# Patient Record
Sex: MALE | Race: WHITE | NOT HISPANIC OR LATINO | Employment: OTHER | URBAN - METROPOLITAN AREA
[De-identification: names, ages, dates, MRNs, and addresses within clinical notes are randomized per-mention and may not be internally consistent; named-entity substitution may affect disease eponyms.]

---

## 2018-08-14 ENCOUNTER — HOSPITAL ENCOUNTER (EMERGENCY)
Facility: CLINIC | Age: 73
Discharge: HOME | End: 2018-08-14
Attending: EMERGENCY MEDICINE | Admitting: EMERGENCY MEDICINE
Payer: MEDICARE

## 2018-08-14 VITALS
SYSTOLIC BLOOD PRESSURE: 153 MMHG | OXYGEN SATURATION: 94 % | DIASTOLIC BLOOD PRESSURE: 79 MMHG | TEMPERATURE: 97.7 F | RESPIRATION RATE: 18 BRPM

## 2018-08-14 DIAGNOSIS — S60.10XA CONTUSION OF NAIL BED OF FINGER, INITIAL ENCOUNTER: ICD-10-CM

## 2018-08-14 DIAGNOSIS — S61.012A THUMB LACERATION, LEFT, INITIAL ENCOUNTER: ICD-10-CM

## 2018-08-14 PROCEDURE — 99283 EMERGENCY DEPT VISIT LOW MDM: CPT

## 2018-08-14 PROCEDURE — A9270 NON-COVERED ITEM OR SERVICE: HCPCS | Mod: GY | Performed by: EMERGENCY MEDICINE

## 2018-08-14 PROCEDURE — 25000132 ZZH RX MED GY IP 250 OP 250 PS 637: Performed by: EMERGENCY MEDICINE

## 2018-08-14 PROCEDURE — 12001 RPR S/N/AX/GEN/TRNK 2.5CM/<: CPT

## 2018-08-14 RX ORDER — LIDOCAINE HYDROCHLORIDE 10 MG/ML
INJECTION, SOLUTION INFILTRATION; PERINEURAL
Status: DISCONTINUED
Start: 2018-08-14 | End: 2018-08-14 | Stop reason: HOSPADM

## 2018-08-14 RX ORDER — ACETAMINOPHEN 325 MG/1
650 TABLET ORAL ONCE
Status: COMPLETED | OUTPATIENT
Start: 2018-08-14 | End: 2018-08-14

## 2018-08-14 RX ADMIN — ACETAMINOPHEN 650 MG: 325 TABLET, FILM COATED ORAL at 12:57

## 2018-08-14 ASSESSMENT — ENCOUNTER SYMPTOMS
COLOR CHANGE: 0
MYALGIAS: 1
JOINT SWELLING: 0
FEVER: 0
WEAKNESS: 0
ARTHRALGIAS: 1
NUMBNESS: 0
WOUND: 1

## 2018-08-14 NOTE — ED TRIAGE NOTES
Pt arrives to ED after an electric screwdriver slipped and 'drove the skin under the nail' to his left thumb. Pt holding pressure as he is on blood thinners. Pain 5/10.

## 2018-08-14 NOTE — DISCHARGE INSTRUCTIONS
He should wear splint to keep your wound covered and to prevent further damage.  It is okay to shower but just let the water gently run over your left thumb.  Do not scrub the wound.  He should keep her wound covered until it is healed, likely 1-2 weeks.  He should keep an eye out for increasing pain, redness, swelling or purulent drainage as these to be signs of infection but we will cover you with antibiotics to prevent this, hopefully.  He should make an appointment with her primary doctor in 1 week for recheck but should come back to the emergency department if you have any concerns or questions.

## 2018-08-14 NOTE — ED NOTES
Pt room was found to be empty by staff when NST entered to apply splint and get ready for dc. MD notified.

## 2018-08-14 NOTE — ED PROVIDER NOTES
History     Chief Complaint:  Hand Laceration    HPI   Celia Stallings is a 73 year old, right hand dominant, male who presents with a hand laceration. The patient reports that he suffered a laceration to his left thumb secondary to accidentally losing control of a screw  prior to arrival. The laceration is located to his left thumb and patient has somewhat dislodged the nail from the nail bed as well. He is on Coumadin for a history of heart valve replacement surgery. He denies any numbness, weakness, or an other symptoms/injuries.    Allergies:  No known drug allergies.    Medications:    Coumadin     Past Medical History:    Heart valve replacement - mechanical, on coumadin.   Kidney stones    Past Surgical History:    Heart valve replacement    Family History:    No pertinent family history.    Social History:  Smoking status: Never smoker  Alcohol use: No  Marital Status:        Review of Systems   Constitutional: Negative for fever.   Musculoskeletal: Positive for arthralgias and myalgias. Negative for joint swelling.   Skin: Positive for wound. Negative for color change.   Neurological: Negative for weakness and numbness.   All other systems reviewed and are negative.    Physical Exam     Patient Vitals for the past 24 hrs:   BP Temp Temp src Heart Rate Resp SpO2   08/14/18 1215 153/79 97.7  F (36.5  C) Oral 66 18 94 %         Physical Exam  Constitutional: Alert, attentive, GCS 15  MSK: Normal range of motion. Partial traumatic removal of lateral thumb nail with proximal end freed from nail fold. sensation intact. Good cap refill. Hemostatic. No focal bony tenderness.   Neurological: Alert, attentive, moving all extremities equally.   Skin: Skin is warm and dry.    Emergency Department Course   Procedures:   Digital Block     PROCEDURE:  Digital Block  LOCATION:  Left Thumb and Nail  ANESTHESIA: Digital block using Lidocaine 1%, total of 10 mLs  PROCEDURE NOTE: The patient tolerated the  procedure well with good relief of discomfort and there were no complications.       Laceration Repair - Partial Nail removal.      LACERATION:  A simple clean 1 cm laceration.    LOCATION:  Left Thumb.    FUNCTION:  Distally sensation, circulation, motor and tendon function are intact.    ANESTHESIA:  Digital block using Lidocaine 1% total of 10 mLs.    PREPARATION:  Irrigation and Scrubbing with Normal Saline and Shur Clens.    DEBRIDEMENT:  None    CLOSURE:  Wound was closed with One Layer.  Skin closed with 1 x 5.0 Ethylon using interrupted sutures after partial nail removal and replacement of proximal end back into nail fold. Hemostatic with tourniquet and at tourniquet take down.     Interventions:  (1257) Tylenol, 650 mg, PO    Emergency Department Course:  Nursing notes and vitals reviewed.  (1227) I performed an exam of the patient as documented above.    I performed a digital block on the patient in the ED for pain relief, see above note for details.  I performed a laceration repair, as documented above.    Findings and plan explained to the patient. Patient discharged home with instructions regarding supportive care, medications, and reasons to return. The importance of close follow-up was reviewed. Patient was provided prescription for keflex.  Impression & Plan      Medical Decision Making:  Celia Stallings is a 73 year old male with a history of mechanical heart valves on Coumadin, last check of INR last week and was within normal limits, presenting with distal left thumb nail injury which occurred as he was doing some cabinetry work and jammed his finger down the lateral aspect of his thumb. No clinical evidence of fracture on exam. Nailbed was explored, please see above procedure note for details, and no nailbed repair was required. His nail was partially hemisect and replaced underneath the nail fold, and a single suture was placed in the distal tip. Given his need for both lactic antibiotics for  dental procedures, and a high risk injury underneath the nail bed in the setting of mechanical heart valves, will provide prophylactic antibiotics, keflex. Return precautions reviewed.  Prior to placement an AlumaFoam splint and plan discharge with Keflex, patient eloped.    Diagnosis:    ICD-10-CM    1. Thumb laceration, left, initial encounter S61.012A    2. Contusion of nail bed of finger, initial encounter S60.10XA        Disposition:  Patient eloped but plan was for discharged to home.      Discharge Medications:  New Prescriptions    No medications on file     Inder Almaguer MD. 3:20 PM 08/14/18.     I, Ethan Li, am serving as a scribe on 8/14/2018 at 12:27 PM to personally document services performed by Dr. Almaguer based on my observations and the provider's statements to me.        Ethan Li  8/14/2018   [facility] EMERGENCY DEPARTMENT       Inder Almaguer MD  08/14/18 1525

## 2021-11-04 ENCOUNTER — APPOINTMENT (OUTPATIENT)
Dept: MRI IMAGING | Facility: CLINIC | Age: 76
DRG: 551 | End: 2021-11-04
Attending: EMERGENCY MEDICINE
Payer: COMMERCIAL

## 2021-11-04 ENCOUNTER — HOSPITAL ENCOUNTER (INPATIENT)
Facility: CLINIC | Age: 76
LOS: 2 days | Discharge: OTHER INSTITUTION | DRG: 551 | End: 2021-11-06
Attending: EMERGENCY MEDICINE | Admitting: INTERNAL MEDICINE
Payer: COMMERCIAL

## 2021-11-04 DIAGNOSIS — M46.40 DISCITIS, UNSPECIFIED SPINAL REGION: ICD-10-CM

## 2021-11-04 LAB
ALBUMIN SERPL-MCNC: 2.6 G/DL (ref 3.4–5)
ALP SERPL-CCNC: 60 U/L (ref 40–150)
ALT SERPL W P-5'-P-CCNC: 42 U/L (ref 0–70)
ANION GAP SERPL CALCULATED.3IONS-SCNC: 5 MMOL/L (ref 3–14)
AST SERPL W P-5'-P-CCNC: 30 U/L (ref 0–45)
BASOPHILS # BLD AUTO: 0 10E3/UL (ref 0–0.2)
BASOPHILS NFR BLD AUTO: 0 %
BILIRUB SERPL-MCNC: 1.1 MG/DL (ref 0.2–1.3)
BUN SERPL-MCNC: 26 MG/DL (ref 7–30)
CALCIUM SERPL-MCNC: 8.9 MG/DL (ref 8.5–10.1)
CHLORIDE BLD-SCNC: 103 MMOL/L (ref 94–109)
CO2 SERPL-SCNC: 28 MMOL/L (ref 20–32)
CREAT SERPL-MCNC: 0.98 MG/DL (ref 0.66–1.25)
CRP SERPL-MCNC: 108 MG/L (ref 0–8)
EOSINOPHIL # BLD AUTO: 0 10E3/UL (ref 0–0.7)
EOSINOPHIL NFR BLD AUTO: 1 %
ERYTHROCYTE [DISTWIDTH] IN BLOOD BY AUTOMATED COUNT: 12.9 % (ref 10–15)
ERYTHROCYTE [SEDIMENTATION RATE] IN BLOOD BY WESTERGREN METHOD: 42 MM/HR (ref 0–20)
GFR SERPL CREATININE-BSD FRML MDRD: 74 ML/MIN/1.73M2
GLUCOSE BLD-MCNC: 116 MG/DL (ref 70–99)
HCT VFR BLD AUTO: 36.5 % (ref 40–53)
HGB BLD-MCNC: 11.8 G/DL (ref 13.3–17.7)
IMM GRANULOCYTES # BLD: 0.1 10E3/UL
IMM GRANULOCYTES NFR BLD: 1 %
INR PPP: 6.85 (ref 0.85–1.15)
LYMPHOCYTES # BLD AUTO: 0.6 10E3/UL (ref 0.8–5.3)
LYMPHOCYTES NFR BLD AUTO: 8 %
MCH RBC QN AUTO: 29.4 PG (ref 26.5–33)
MCHC RBC AUTO-ENTMCNC: 32.3 G/DL (ref 31.5–36.5)
MCV RBC AUTO: 91 FL (ref 78–100)
MONOCYTES # BLD AUTO: 0.9 10E3/UL (ref 0–1.3)
MONOCYTES NFR BLD AUTO: 10 %
NEUTROPHILS # BLD AUTO: 6.7 10E3/UL (ref 1.6–8.3)
NEUTROPHILS NFR BLD AUTO: 80 %
NRBC # BLD AUTO: 0 10E3/UL
NRBC BLD AUTO-RTO: 0 /100
PLATELET # BLD AUTO: 249 10E3/UL (ref 150–450)
POTASSIUM BLD-SCNC: 4.2 MMOL/L (ref 3.4–5.3)
PROCALCITONIN SERPL-MCNC: 0.25 NG/ML
PROT SERPL-MCNC: 6 G/DL (ref 6.8–8.8)
RBC # BLD AUTO: 4.01 10E6/UL (ref 4.4–5.9)
SARS-COV-2 RNA RESP QL NAA+PROBE: NEGATIVE
SODIUM SERPL-SCNC: 136 MMOL/L (ref 133–144)
WBC # BLD AUTO: 8.3 10E3/UL (ref 4–11)

## 2021-11-04 PROCEDURE — C9803 HOPD COVID-19 SPEC COLLECT: HCPCS

## 2021-11-04 PROCEDURE — 99223 1ST HOSP IP/OBS HIGH 75: CPT | Mod: AI | Performed by: INTERNAL MEDICINE

## 2021-11-04 PROCEDURE — 120N000001 HC R&B MED SURG/OB

## 2021-11-04 PROCEDURE — 82040 ASSAY OF SERUM ALBUMIN: CPT | Performed by: EMERGENCY MEDICINE

## 2021-11-04 PROCEDURE — 250N000013 HC RX MED GY IP 250 OP 250 PS 637: Performed by: INTERNAL MEDICINE

## 2021-11-04 PROCEDURE — 250N000011 HC RX IP 250 OP 636: Performed by: EMERGENCY MEDICINE

## 2021-11-04 PROCEDURE — 87077 CULTURE AEROBIC IDENTIFY: CPT | Performed by: EMERGENCY MEDICINE

## 2021-11-04 PROCEDURE — 36415 COLL VENOUS BLD VENIPUNCTURE: CPT | Performed by: INTERNAL MEDICINE

## 2021-11-04 PROCEDURE — 86140 C-REACTIVE PROTEIN: CPT | Performed by: EMERGENCY MEDICINE

## 2021-11-04 PROCEDURE — 96375 TX/PRO/DX INJ NEW DRUG ADDON: CPT

## 2021-11-04 PROCEDURE — 36415 COLL VENOUS BLD VENIPUNCTURE: CPT | Performed by: EMERGENCY MEDICINE

## 2021-11-04 PROCEDURE — 99285 EMERGENCY DEPT VISIT HI MDM: CPT | Mod: 25

## 2021-11-04 PROCEDURE — 84145 PROCALCITONIN (PCT): CPT | Performed by: INTERNAL MEDICINE

## 2021-11-04 PROCEDURE — 85610 PROTHROMBIN TIME: CPT | Performed by: INTERNAL MEDICINE

## 2021-11-04 PROCEDURE — 72148 MRI LUMBAR SPINE W/O DYE: CPT | Mod: MG

## 2021-11-04 PROCEDURE — 96366 THER/PROPH/DIAG IV INF ADDON: CPT

## 2021-11-04 PROCEDURE — 96365 THER/PROPH/DIAG IV INF INIT: CPT

## 2021-11-04 PROCEDURE — 85025 COMPLETE CBC W/AUTO DIFF WBC: CPT | Performed by: EMERGENCY MEDICINE

## 2021-11-04 PROCEDURE — 250N000011 HC RX IP 250 OP 636: Performed by: INTERNAL MEDICINE

## 2021-11-04 PROCEDURE — 85652 RBC SED RATE AUTOMATED: CPT | Performed by: EMERGENCY MEDICINE

## 2021-11-04 PROCEDURE — 87149 DNA/RNA DIRECT PROBE: CPT | Performed by: EMERGENCY MEDICINE

## 2021-11-04 PROCEDURE — 258N000003 HC RX IP 258 OP 636: Performed by: EMERGENCY MEDICINE

## 2021-11-04 PROCEDURE — 80053 COMPREHEN METABOLIC PANEL: CPT | Performed by: EMERGENCY MEDICINE

## 2021-11-04 PROCEDURE — 87635 SARS-COV-2 COVID-19 AMP PRB: CPT | Performed by: EMERGENCY MEDICINE

## 2021-11-04 PROCEDURE — 96372 THER/PROPH/DIAG INJ SC/IM: CPT | Performed by: EMERGENCY MEDICINE

## 2021-11-04 RX ORDER — ACETAMINOPHEN 650 MG/1
650 SUPPOSITORY RECTAL EVERY 6 HOURS PRN
Status: DISCONTINUED | OUTPATIENT
Start: 2021-11-04 | End: 2021-11-06 | Stop reason: HOSPADM

## 2021-11-04 RX ORDER — NALOXONE HYDROCHLORIDE 0.4 MG/ML
0.2 INJECTION, SOLUTION INTRAMUSCULAR; INTRAVENOUS; SUBCUTANEOUS
Status: DISCONTINUED | OUTPATIENT
Start: 2021-11-04 | End: 2021-11-06 | Stop reason: HOSPADM

## 2021-11-04 RX ORDER — NALOXONE HYDROCHLORIDE 0.4 MG/ML
0.4 INJECTION, SOLUTION INTRAMUSCULAR; INTRAVENOUS; SUBCUTANEOUS
Status: DISCONTINUED | OUTPATIENT
Start: 2021-11-04 | End: 2021-11-06 | Stop reason: HOSPADM

## 2021-11-04 RX ORDER — AMLODIPINE BESYLATE 2.5 MG/1
2.5 TABLET ORAL DAILY
Status: ON HOLD | COMMUNITY
Start: 2021-04-13 | End: 2024-10-05

## 2021-11-04 RX ORDER — FINASTERIDE 5 MG/1
5 TABLET, FILM COATED ORAL DAILY
Status: DISCONTINUED | OUTPATIENT
Start: 2021-11-04 | End: 2021-11-06 | Stop reason: HOSPADM

## 2021-11-04 RX ORDER — LOSARTAN POTASSIUM 50 MG/1
50 TABLET ORAL DAILY
COMMUNITY
Start: 2021-02-19

## 2021-11-04 RX ORDER — WARFARIN SODIUM 5 MG/1
5 TABLET ORAL DAILY
Status: ON HOLD | COMMUNITY
Start: 2021-03-03 | End: 2024-10-05

## 2021-11-04 RX ORDER — ONDANSETRON 2 MG/ML
4 INJECTION INTRAMUSCULAR; INTRAVENOUS EVERY 6 HOURS PRN
Status: DISCONTINUED | OUTPATIENT
Start: 2021-11-04 | End: 2021-11-06 | Stop reason: HOSPADM

## 2021-11-04 RX ORDER — TAMSULOSIN HYDROCHLORIDE 0.4 MG/1
0.4 CAPSULE ORAL DAILY
COMMUNITY
Start: 2021-10-06 | End: 2022-10-06

## 2021-11-04 RX ORDER — GABAPENTIN 100 MG/1
100 CAPSULE ORAL DAILY
Status: DISCONTINUED | OUTPATIENT
Start: 2021-11-05 | End: 2021-11-06 | Stop reason: HOSPADM

## 2021-11-04 RX ORDER — METOPROLOL SUCCINATE 25 MG/1
25 TABLET, EXTENDED RELEASE ORAL DAILY
Status: DISCONTINUED | OUTPATIENT
Start: 2021-11-04 | End: 2021-11-06 | Stop reason: HOSPADM

## 2021-11-04 RX ORDER — ONDANSETRON 4 MG/1
4 TABLET, ORALLY DISINTEGRATING ORAL EVERY 6 HOURS PRN
Status: DISCONTINUED | OUTPATIENT
Start: 2021-11-04 | End: 2021-11-06 | Stop reason: HOSPADM

## 2021-11-04 RX ORDER — HYDROMORPHONE HYDROCHLORIDE 1 MG/ML
0.5 INJECTION, SOLUTION INTRAMUSCULAR; INTRAVENOUS; SUBCUTANEOUS ONCE
Status: COMPLETED | OUTPATIENT
Start: 2021-11-04 | End: 2021-11-04

## 2021-11-04 RX ORDER — LIDOCAINE 40 MG/G
CREAM TOPICAL
Status: DISCONTINUED | OUTPATIENT
Start: 2021-11-04 | End: 2021-11-06 | Stop reason: HOSPADM

## 2021-11-04 RX ORDER — METOPROLOL SUCCINATE 25 MG/1
25 TABLET, EXTENDED RELEASE ORAL DAILY
Status: ON HOLD | COMMUNITY
Start: 2021-04-13 | End: 2024-10-05

## 2021-11-04 RX ORDER — ONDANSETRON 4 MG/1
4 TABLET, ORALLY DISINTEGRATING ORAL ONCE
Status: COMPLETED | OUTPATIENT
Start: 2021-11-04 | End: 2021-11-04

## 2021-11-04 RX ORDER — ACETAMINOPHEN 325 MG/1
650 TABLET ORAL EVERY 6 HOURS PRN
Status: DISCONTINUED | OUTPATIENT
Start: 2021-11-04 | End: 2021-11-06 | Stop reason: HOSPADM

## 2021-11-04 RX ORDER — TAMSULOSIN HYDROCHLORIDE 0.4 MG/1
0.4 CAPSULE ORAL DAILY
Status: DISCONTINUED | OUTPATIENT
Start: 2021-11-04 | End: 2021-11-06 | Stop reason: HOSPADM

## 2021-11-04 RX ORDER — HYDROMORPHONE HCL IN WATER/PF 6 MG/30 ML
0.2 PATIENT CONTROLLED ANALGESIA SYRINGE INTRAVENOUS
Status: DISCONTINUED | OUTPATIENT
Start: 2021-11-04 | End: 2021-11-06 | Stop reason: HOSPADM

## 2021-11-04 RX ORDER — MORPHINE SULFATE 4 MG/ML
6 INJECTION, SOLUTION INTRAMUSCULAR; INTRAVENOUS ONCE
Status: COMPLETED | OUTPATIENT
Start: 2021-11-04 | End: 2021-11-04

## 2021-11-04 RX ORDER — ATORVASTATIN CALCIUM 20 MG/1
20 TABLET, FILM COATED ORAL DAILY
Status: ON HOLD | COMMUNITY
Start: 2021-02-19 | End: 2024-10-05

## 2021-11-04 RX ORDER — CEFAZOLIN SODIUM 1 G/50ML
1750 SOLUTION INTRAVENOUS EVERY 24 HOURS
Status: DISCONTINUED | OUTPATIENT
Start: 2021-11-05 | End: 2021-11-06

## 2021-11-04 RX ORDER — CEFAZOLIN SODIUM 1 G/50ML
2000 SOLUTION INTRAVENOUS ONCE
Status: COMPLETED | OUTPATIENT
Start: 2021-11-04 | End: 2021-11-04

## 2021-11-04 RX ORDER — LIDOCAINE 4 G/G
1 PATCH TOPICAL
Status: DISCONTINUED | OUTPATIENT
Start: 2021-11-04 | End: 2021-11-06 | Stop reason: HOSPADM

## 2021-11-04 RX ORDER — FINASTERIDE 5 MG/1
5 TABLET, FILM COATED ORAL DAILY
COMMUNITY
Start: 2021-10-06 | End: 2022-10-06

## 2021-11-04 RX ADMIN — METOPROLOL SUCCINATE 25 MG: 25 TABLET, EXTENDED RELEASE ORAL at 18:03

## 2021-11-04 RX ADMIN — HYDROMORPHONE HYDROCHLORIDE 1 MG: 2 TABLET ORAL at 21:11

## 2021-11-04 RX ADMIN — FINASTERIDE 5 MG: 5 TABLET, FILM COATED ORAL at 18:03

## 2021-11-04 RX ADMIN — ONDANSETRON 4 MG: 4 TABLET, ORALLY DISINTEGRATING ORAL at 08:54

## 2021-11-04 RX ADMIN — HYDROMORPHONE HYDROCHLORIDE 0.2 MG: 0.2 INJECTION, SOLUTION INTRAMUSCULAR; INTRAVENOUS; SUBCUTANEOUS at 22:17

## 2021-11-04 RX ADMIN — MORPHINE SULFATE 6 MG: 4 INJECTION INTRAVENOUS at 08:55

## 2021-11-04 RX ADMIN — TAMSULOSIN HYDROCHLORIDE 0.4 MG: 0.4 CAPSULE ORAL at 18:00

## 2021-11-04 RX ADMIN — HYDROMORPHONE HYDROCHLORIDE 0.5 MG: 1 INJECTION, SOLUTION INTRAMUSCULAR; INTRAVENOUS; SUBCUTANEOUS at 17:59

## 2021-11-04 RX ADMIN — LIDOCAINE 1 PATCH: 246 PATCH TOPICAL at 21:11

## 2021-11-04 RX ADMIN — VANCOMYCIN HYDROCHLORIDE 2000 MG: 10 INJECTION, POWDER, LYOPHILIZED, FOR SOLUTION INTRAVENOUS at 16:12

## 2021-11-04 ASSESSMENT — ENCOUNTER SYMPTOMS
WEAKNESS: 0
NUMBNESS: 0
BACK PAIN: 1

## 2021-11-04 ASSESSMENT — ACTIVITIES OF DAILY LIVING (ADL)
ADLS_ACUITY_SCORE: 12
WHICH_OF_THE_ABOVE_FUNCTIONAL_RISKS_HAD_A_RECENT_ONSET_OR_CHANGE?: AMBULATION
WALKING_OR_CLIMBING_STAIRS: AMBULATION DIFFICULTY, REQUIRES EQUIPMENT
DOING_ERRANDS_INDEPENDENTLY_DIFFICULTY: NO
FALL_HISTORY_WITHIN_LAST_SIX_MONTHS: NO
ADLS_ACUITY_SCORE: 12
ADLS_ACUITY_SCORE: 6
DRESSING/BATHING_DIFFICULTY: NO
HEARING_DIFFICULTY_OR_DEAF: NO
EQUIPMENT_CURRENTLY_USED_AT_HOME: CANE, STRAIGHT
TOILETING_ISSUES: NO
WEAR_GLASSES_OR_BLIND: NO
DIFFICULTY_COMMUNICATING: NO
ADLS_ACUITY_SCORE: 7
ADLS_ACUITY_SCORE: 4
ADLS_ACUITY_SCORE: 12
ADLS_ACUITY_SCORE: 4
WALKING_OR_CLIMBING_STAIRS_DIFFICULTY: YES
CONCENTRATING,_REMEMBERING_OR_MAKING_DECISIONS_DIFFICULTY: NO
DIFFICULTY_EATING/SWALLOWING: NO

## 2021-11-04 ASSESSMENT — MIFFLIN-ST. JEOR: SCORE: 1640.25
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
## 2021-11-04 NOTE — PHARMACY-ADMISSION MEDICATION HISTORY
Admission medication history interview status for this patient is complete. See [facility] admission navigator for allergy information, prior to admission medications and immunization status.     Medication history interview done, indicate source(s): Patient  Medication history resources (including written lists, pill bottles, clinic record):[place], Care Everywhere, chart review  Pharmacy: [facility] [address]    Changes made to PTA medication list:  Added: all meds  Changed: none  Reported as Not Taking: none  Removed: none    Actions taken by pharmacist (provider contacted, etc):left sticky note for provider     Additional medication history information:patient has a prescription for Lovenox with start date on 11/22/2021.    Medication reconciliation/reorder completed by provider prior to medication history?  N    Prior to Admission medications    Medication Sig Last Dose Taking? Auth Provider   amLODIPine (NORVASC) 2.5 MG tablet Take 2.5 mg by mouth daily 11/3/2021 at Unknown time Yes Unknown, Entered By History   atorvastatin (LIPITOR) 20 MG tablet Take 20 mg by mouth daily 11/3/2021 at Unknown time Yes Unknown, Entered By History   enoxaparin ANTICOAGULANT (LOVENOX) 100 MG/ML syringe Inject 100 mg Subcutaneous every 12 hours  at not started yet; start date is 11/22 Yes Unknown, Entered By History   finasteride (PROSCAR) 5 MG tablet Take 5 mg by mouth daily 11/3/2021 at Unknown time Yes Unknown, Entered By History   losartan (COZAAR) 100 MG tablet Take 100 mg by mouth daily 11/3/2021 at Unknown time Yes Unknown, Entered By History   metoprolol succinate ER (TOPROL-XL) 25 MG 24 hr tablet Take 25 mg by mouth daily 11/3/2021 at Unknown time Yes Unknown, Entered By History   tamsulosin (FLOMAX) 0.4 MG capsule Take 0.4 mg by mouth daily 11/3/2021 at Unknown time Yes Unknown, Entered By History   warfarin ANTICOAGULANT (COUMADIN) 5 MG tablet Take 5 mg by mouth daily  11/3/2021 at AM Yes Unknown, Entered By History

## 2021-11-04 NOTE — ED NOTES
Entered pt room to administer meds. Pt's son brought home medications and pt reported he took his seven daily meds. Nurse had previously advised son to not give pt home medications. Documented meds that pt took on MAR and updated home medication list with accurate times for home meds that were not yet ordered.

## 2021-11-05 ENCOUNTER — APPOINTMENT (OUTPATIENT)
Dept: CARDIOLOGY | Facility: CLINIC | Age: 76
DRG: 551 | End: 2021-11-05
Attending: INTERNAL MEDICINE
Payer: COMMERCIAL

## 2021-11-05 ENCOUNTER — APPOINTMENT (OUTPATIENT)
Dept: OCCUPATIONAL THERAPY | Facility: CLINIC | Age: 76
DRG: 551 | End: 2021-11-05
Attending: INTERNAL MEDICINE
Payer: COMMERCIAL

## 2021-11-05 LAB
CRP SERPL-MCNC: 93.7 MG/L (ref 0–8)
ENTEROCOCCUS FAECALIS: DETECTED
ENTEROCOCCUS FAECIUM: NOT DETECTED
ERYTHROCYTE [SEDIMENTATION RATE] IN BLOOD BY WESTERGREN METHOD: 37 MM/HR (ref 0–20)
INR PPP: 5.91 (ref 0.85–1.15)
LISTERIA SPECIES (DETECTED/NOT DETECTED): NOT DETECTED
LVEF ECHO: NORMAL
STAPHYLOCOCCUS AUREUS: NOT DETECTED
STAPHYLOCOCCUS EPIDERMIDIS: NOT DETECTED
STAPHYLOCOCCUS LUGDUNENSIS: NOT DETECTED
STAPHYLOCOCCUS SPECIES: NOT DETECTED
STREPTOCOCCUS AGALACTIAE: NOT DETECTED
STREPTOCOCCUS ANGINOSUS GROUP: NOT DETECTED
STREPTOCOCCUS PNEUMONIAE: NOT DETECTED
STREPTOCOCCUS PYOGENES: NOT DETECTED
STREPTOCOCCUS SPECIES: NOT DETECTED

## 2021-11-05 PROCEDURE — 36415 COLL VENOUS BLD VENIPUNCTURE: CPT | Performed by: INTERNAL MEDICINE

## 2021-11-05 PROCEDURE — 87077 CULTURE AEROBIC IDENTIFY: CPT | Performed by: INTERNAL MEDICINE

## 2021-11-05 PROCEDURE — 93320 DOPPLER ECHO COMPLETE: CPT | Mod: 26 | Performed by: INTERNAL MEDICINE

## 2021-11-05 PROCEDURE — 85652 RBC SED RATE AUTOMATED: CPT | Performed by: INTERNAL MEDICINE

## 2021-11-05 PROCEDURE — 93325 DOPPLER ECHO COLOR FLOW MAPG: CPT | Mod: 26 | Performed by: INTERNAL MEDICINE

## 2021-11-05 PROCEDURE — 258N000003 HC RX IP 258 OP 636: Performed by: INTERNAL MEDICINE

## 2021-11-05 PROCEDURE — 250N000011 HC RX IP 250 OP 636

## 2021-11-05 PROCEDURE — 85610 PROTHROMBIN TIME: CPT | Performed by: INTERNAL MEDICINE

## 2021-11-05 PROCEDURE — 250N000013 HC RX MED GY IP 250 OP 250 PS 637: Performed by: INTERNAL MEDICINE

## 2021-11-05 PROCEDURE — 120N000001 HC R&B MED SURG/OB

## 2021-11-05 PROCEDURE — 86140 C-REACTIVE PROTEIN: CPT | Performed by: INTERNAL MEDICINE

## 2021-11-05 PROCEDURE — 99222 1ST HOSP IP/OBS MODERATE 55: CPT | Mod: 25 | Performed by: INTERNAL MEDICINE

## 2021-11-05 PROCEDURE — 250N000011 HC RX IP 250 OP 636: Performed by: INTERNAL MEDICINE

## 2021-11-05 PROCEDURE — 93325 DOPPLER ECHO COLOR FLOW MAPG: CPT

## 2021-11-05 PROCEDURE — 93312 ECHO TRANSESOPHAGEAL: CPT | Mod: 26 | Performed by: INTERNAL MEDICINE

## 2021-11-05 PROCEDURE — 99233 SBSQ HOSP IP/OBS HIGH 50: CPT | Performed by: INTERNAL MEDICINE

## 2021-11-05 PROCEDURE — 250N000009 HC RX 250

## 2021-11-05 PROCEDURE — 97166 OT EVAL MOD COMPLEX 45 MIN: CPT | Mod: GO | Performed by: REHABILITATION PRACTITIONER

## 2021-11-05 PROCEDURE — 99152 MOD SED SAME PHYS/QHP 5/>YRS: CPT | Performed by: INTERNAL MEDICINE

## 2021-11-05 PROCEDURE — 76376 3D RENDER W/INTRP POSTPROCES: CPT | Performed by: INTERNAL MEDICINE

## 2021-11-05 RX ORDER — FENTANYL CITRATE 50 UG/ML
25 INJECTION, SOLUTION INTRAMUSCULAR; INTRAVENOUS
Status: DISCONTINUED | OUTPATIENT
Start: 2021-11-05 | End: 2021-11-06 | Stop reason: HOSPADM

## 2021-11-05 RX ORDER — LIDOCAINE 40 MG/G
CREAM TOPICAL
Status: DISCONTINUED | OUTPATIENT
Start: 2021-11-05 | End: 2021-11-06 | Stop reason: HOSPADM

## 2021-11-05 RX ORDER — LIDOCAINE HYDROCHLORIDE 20 MG/ML
SOLUTION OROPHARYNGEAL
Status: COMPLETED
Start: 2021-11-05 | End: 2021-11-05

## 2021-11-05 RX ORDER — NALOXONE HYDROCHLORIDE 0.4 MG/ML
0.4 INJECTION, SOLUTION INTRAMUSCULAR; INTRAVENOUS; SUBCUTANEOUS
Status: DISCONTINUED | OUTPATIENT
Start: 2021-11-05 | End: 2021-11-06 | Stop reason: HOSPADM

## 2021-11-05 RX ORDER — GLYCOPYRROLATE 0.2 MG/ML
INJECTION INTRAMUSCULAR; INTRAVENOUS
Status: COMPLETED
Start: 2021-11-05 | End: 2021-11-05

## 2021-11-05 RX ORDER — LIDOCAINE HYDROCHLORIDE 40 MG/ML
1.5 SOLUTION TOPICAL ONCE
Status: DISCONTINUED | OUTPATIENT
Start: 2021-11-05 | End: 2021-11-06 | Stop reason: HOSPADM

## 2021-11-05 RX ORDER — DEXTROSE MONOHYDRATE 25 G/50ML
9.5 INJECTION, SOLUTION INTRAVENOUS
Status: DISCONTINUED | OUTPATIENT
Start: 2021-11-05 | End: 2021-11-06 | Stop reason: HOSPADM

## 2021-11-05 RX ORDER — LIDOCAINE 50 MG/G
OINTMENT TOPICAL ONCE
Status: DISCONTINUED | OUTPATIENT
Start: 2021-11-05 | End: 2021-11-06 | Stop reason: HOSPADM

## 2021-11-05 RX ORDER — FLUMAZENIL 0.1 MG/ML
INJECTION, SOLUTION INTRAVENOUS
Status: DISCONTINUED
Start: 2021-11-05 | End: 2021-11-05 | Stop reason: WASHOUT

## 2021-11-05 RX ORDER — LIDOCAINE HYDROCHLORIDE 20 MG/ML
15 SOLUTION OROPHARYNGEAL ONCE
Status: COMPLETED | OUTPATIENT
Start: 2021-11-05 | End: 2021-11-05

## 2021-11-05 RX ORDER — FLUMAZENIL 0.1 MG/ML
0.2 INJECTION, SOLUTION INTRAVENOUS
Status: DISCONTINUED | OUTPATIENT
Start: 2021-11-05 | End: 2021-11-06 | Stop reason: HOSPADM

## 2021-11-05 RX ORDER — NALOXONE HYDROCHLORIDE 0.4 MG/ML
0.2 INJECTION, SOLUTION INTRAMUSCULAR; INTRAVENOUS; SUBCUTANEOUS
Status: DISCONTINUED | OUTPATIENT
Start: 2021-11-05 | End: 2021-11-06 | Stop reason: HOSPADM

## 2021-11-05 RX ORDER — SODIUM CHLORIDE 9 MG/ML
INJECTION, SOLUTION INTRAVENOUS CONTINUOUS PRN
Status: DISCONTINUED | OUTPATIENT
Start: 2021-11-05 | End: 2021-11-06 | Stop reason: CLARIF

## 2021-11-05 RX ORDER — NALOXONE HYDROCHLORIDE 0.4 MG/ML
INJECTION, SOLUTION INTRAMUSCULAR; INTRAVENOUS; SUBCUTANEOUS
Status: DISCONTINUED
Start: 2021-11-05 | End: 2021-11-05 | Stop reason: WASHOUT

## 2021-11-05 RX ORDER — CEFTRIAXONE 2 G/1
2 INJECTION, POWDER, FOR SOLUTION INTRAMUSCULAR; INTRAVENOUS EVERY 24 HOURS
Status: DISCONTINUED | OUTPATIENT
Start: 2021-11-05 | End: 2021-11-06

## 2021-11-05 RX ORDER — FENTANYL CITRATE 50 UG/ML
INJECTION, SOLUTION INTRAMUSCULAR; INTRAVENOUS
Status: DISCONTINUED
Start: 2021-11-05 | End: 2021-11-05 | Stop reason: WASHOUT

## 2021-11-05 RX ORDER — FENTANYL CITRATE 50 UG/ML
INJECTION, SOLUTION INTRAMUSCULAR; INTRAVENOUS
Status: COMPLETED
Start: 2021-11-05 | End: 2021-11-05

## 2021-11-05 RX ORDER — GLYCOPYRROLATE 0.2 MG/ML
0.1 INJECTION, SOLUTION INTRAMUSCULAR; INTRAVENOUS ONCE
Status: COMPLETED | OUTPATIENT
Start: 2021-11-05 | End: 2021-11-05

## 2021-11-05 RX ADMIN — TOPICAL ANESTHETIC 0.5 ML: 200 SPRAY DENTAL; PERIODONTAL at 14:31

## 2021-11-05 RX ADMIN — LIDOCAINE 1 PATCH: 246 PATCH TOPICAL at 20:23

## 2021-11-05 RX ADMIN — CEFTRIAXONE 2 G: 2 INJECTION, POWDER, FOR SOLUTION INTRAMUSCULAR; INTRAVENOUS at 07:48

## 2021-11-05 RX ADMIN — LIDOCAINE HYDROCHLORIDE 15 ML: 20 SOLUTION ORAL; TOPICAL at 14:16

## 2021-11-05 RX ADMIN — GLYCOPYRROLATE 0.1 MG: 0.2 INJECTION, SOLUTION INTRAMUSCULAR; INTRAVENOUS at 14:19

## 2021-11-05 RX ADMIN — HYDROMORPHONE HYDROCHLORIDE 1 MG: 2 TABLET ORAL at 01:15

## 2021-11-05 RX ADMIN — TOPICAL ANESTHETIC 0.5 ML: 200 SPRAY DENTAL; PERIODONTAL at 14:32

## 2021-11-05 RX ADMIN — MIDAZOLAM 2.5 MG: 1 INJECTION INTRAMUSCULAR; INTRAVENOUS at 14:37

## 2021-11-05 RX ADMIN — FENTANYL CITRATE 100 MCG: 50 INJECTION, SOLUTION INTRAMUSCULAR; INTRAVENOUS at 14:40

## 2021-11-05 RX ADMIN — HYDROMORPHONE HYDROCHLORIDE 1 MG: 2 TABLET ORAL at 20:23

## 2021-11-05 RX ADMIN — VANCOMYCIN HYDROCHLORIDE 1750 MG: 10 INJECTION, POWDER, LYOPHILIZED, FOR SOLUTION INTRAVENOUS at 16:11

## 2021-11-05 RX ADMIN — GABAPENTIN 100 MG: 100 CAPSULE ORAL at 08:58

## 2021-11-05 RX ADMIN — MIDAZOLAM 1.5 MG: 1 INJECTION INTRAMUSCULAR; INTRAVENOUS at 14:46

## 2021-11-05 RX ADMIN — Medication 1 MG: at 01:16

## 2021-11-05 RX ADMIN — ACETAMINOPHEN 650 MG: 325 TABLET, FILM COATED ORAL at 09:12

## 2021-11-05 RX ADMIN — HYDROMORPHONE HYDROCHLORIDE 1 MG: 2 TABLET ORAL at 13:25

## 2021-11-05 RX ADMIN — TAMSULOSIN HYDROCHLORIDE 0.4 MG: 0.4 CAPSULE ORAL at 08:59

## 2021-11-05 RX ADMIN — METOPROLOL SUCCINATE 25 MG: 25 TABLET, EXTENDED RELEASE ORAL at 08:59

## 2021-11-05 RX ADMIN — LIDOCAINE HYDROCHLORIDE 15 ML: 20 SOLUTION OROPHARYNGEAL at 14:16

## 2021-11-05 RX ADMIN — MIDAZOLAM 1 MG: 1 INJECTION INTRAMUSCULAR; INTRAVENOUS at 14:56

## 2021-11-05 RX ADMIN — HYDROMORPHONE HYDROCHLORIDE 1 MG: 2 TABLET ORAL at 09:12

## 2021-11-05 RX ADMIN — ACETAMINOPHEN 650 MG: 325 TABLET, FILM COATED ORAL at 17:20

## 2021-11-05 RX ADMIN — ACETAMINOPHEN 650 MG: 325 TABLET, FILM COATED ORAL at 01:16

## 2021-11-05 RX ADMIN — FINASTERIDE 5 MG: 5 TABLET, FILM COATED ORAL at 08:58

## 2021-11-05 RX ADMIN — LIDOCAINE HYDROCHLORIDE 15 ML: 20 SOLUTION ORAL; TOPICAL at 14:17

## 2021-11-05 ASSESSMENT — ACTIVITIES OF DAILY LIVING (ADL)
ADLS_ACUITY_SCORE: 12
ADLS_ACUITY_SCORE: 6
ADLS_ACUITY_SCORE: 6
ADLS_ACUITY_SCORE: 12
ADLS_ACUITY_SCORE: 6
ADLS_ACUITY_SCORE: 12
PREVIOUS_RESPONSIBILITIES: MEAL PREP;HOUSEKEEPING;LAUNDRY;SHOPPING;MEDICATION MANAGEMENT;FINANCES;DRIVING;WORK
ADLS_ACUITY_SCORE: 12

## 2021-11-05 NOTE — PROVIDER NOTIFICATION
DATE:  11/5/2021   TIME OF RECEIPT FROM LAB:  0643  LAB TEST:  Blood cultures drawn on 11/4/21 at 1502 and 1507  LAB VALUE:  Growing Gram+ cocci in pairs and chains  RESULTS GIVEN WITH READ-BACK TO (PROVIDER):  Admitting Pager (Hospitalist)    TIME LAB VALUE REPORTED TO PROVIDER:   0645

## 2021-11-05 NOTE — PROVIDER NOTIFICATION
DATE:  11/5/2021   TIME OF RECEIPT FROM LAB:  1004  LAB TEST:  Blood culture drawn on 11/4/2021 at 1502 now shows enterococcus faecalis  LAB VALUE:  Enterococcus faecalis  RESULTS GIVEN WITH READ-BACK TO (PROVIDER):  parpia   TIME LAB VALUE REPORTED TO PROVIDER:   1007

## 2021-11-05 NOTE — PLAN OF CARE
Arrived to room 648 from ER at 2040 via cart, alert and oriented x 4, oriented to room and call system, IV patent, rates pain 7/10 (10 with movement), reviewed welcome folder and pain/medication information packet. Admission profile completed. Home medications sent to pharmacy. Pt voiding small amounts. Straight caths at home but declined at this time. PRN PO dilaudid and IV dilaudid given, lidocaine patch in place, and ice placed. Will continue to monitor.

## 2021-11-05 NOTE — PHARMACY-VANCOMYCIN DOSING SERVICE
Pharmacy Vancomycin Initial Note  Date of Service 2021  Patient's  1945  76 year old, male    Indication: Bone and Joint Infection    Current estimated CrCl = Estimated Creatinine Clearance: 69.9 mL/min (based on SCr of 0.98 mg/dL).    Creatinine for last 3 days  2021: 11:17 AM Creatinine 0.98 mg/dL    Recent Vancomycin Level(s) for last 3 days  No results found for requested labs within last 72 hours.      Vancomycin IV Administrations (past 72 hours)                   vancomycin (VANCOCIN) 2,000 mg in sodium chloride 0.9 % 500 mL intermittent infusion (mg) 2,000 mg New Bag 21 1612                Nephrotoxins and other renal medications (From now, onward)    Start     Dose/Rate Route Frequency Ordered Stop    21 1600  vancomycin (VANCOCIN) 1,750 mg in sodium chloride 0.9 % 500 mL intermittent infusion      1,750 mg  over 2 Hours Intravenous EVERY 24 HOURS 21            Contrast Orders - past 72 hours (72h ago, onward)    None        Loading dose: 2000 mg at 16:12 2021.  Regimen: 1750 mg IV every 24 hours.  Start time: 1600 on 2021  Exposure target: AUC24 (range)400-600 mg/L.hr   AUC24,ss: 514 mg/L.hr  Probability of AUC24 > 400: 77 %  Ctrough,ss: 14.7 mg/L  Probability of Ctrough,ss > 20: 25 %  Probability of nephrotoxicity (Lodise STORMY ): 10 %          Plan:  1. Start vancomycin  1750 mg IV q24h.   2. Vancomycin monitoring method: AUC  3. Vancomycin therapeutic monitoring goal: 400-600 mg*h/L  4. Pharmacy will check vancomycin levels as appropriate in 3-5 Days.    5. Serum creatinine levels will be ordered every 48 hours.      Tino Willis [facility]
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
## 2021-11-05 NOTE — CONSULTS
ID consult dictated IMP 1 75 yo male with back pain, in retropsect sig systemic sxs/confusion, primary issue immediately apparent with high grade enterocccus bacteremia, almost certainly PVE(artAV and MV)    REc serail BC, DARRIN, for now vanco/ceftriaxone, if sens to amp/ceftriaxone, ? Transfer to facility with card surgery but no obvious urgent threat

## 2021-11-05 NOTE — PROGRESS NOTES
Cardiology consult dictated.  76-year-old patient with a prior [place] mechanical mitral valve and a [employer]onic Peralta aortic valve with evidence of discitis and high-grade Enterococcus bacteremia.  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  For transesophageal echocardiogram today.  Infectious disease feels the patient need to be transferred to a facility with cardiovascular surgery.  Patient is normally followed at [facility] so he should be transferred to [facility].

## 2021-11-05 NOTE — PROGRESS NOTES
Preliminary DARRIN report  No obvious vegetations seen on the mitral and aortic mechanical valve prosthesis.  No obvious abscess formation and no vegetation seen on the tricuspid nor on the pulmonary valves with no evidence of perforation.  Both prostheses appear to be functioning well.
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
## 2021-11-05 NOTE — PROVIDER NOTIFICATION
DATE:  11/5/2021   TIME OF RECEIPT FROM LAB:  0706  LAB TEST:  INR  LAB VALUE:  5.91  RESULTS GIVEN WITH READ-BACK TO (PROVIDER):  Admitting pager (Hospitalist)    TIME LAB VALUE REPORTED TO PROVIDER:   0710

## 2021-11-05 NOTE — PLAN OF CARE
VS-afebrile, stable, tele ordered and started. Blood culture drawn on 11/4 at 1502 now shows enterococcus faecalis. Both Dr. Ambrocio and Dr. Marsh aware. On vanco and rocephin.  Lung Sounds-clear, no cough, on room air, taking deep breathes.   O2-on room air.   GI-+bs, +flatus. Lbm was Wednesday. Tolerating reg bkst. Npo at 1120,   -bladder scanned at 1000 for 0. Pt st caths himself 1-2 times per day. Started 2 weeks ago per pt. St cathed for 600cc claire in color at 1355. No void via urinal this shift.   IVF-on rocephin and vanco.   Dressings-none.   CMS-denies numbness and tingling, trace edema in marian ankles. +pp, strong dorsi/planter flexion.   Drain-none.   Activity-bedrest, turning in bed. Working with OT and PT.   Pain-rates pain level a 6 down to a  4. Taking tylenol and dilaudid.   D/C Plan-to be determined.   Son here, pt down for a DARRIN at 1400 via cart today.. cardiology following.

## 2021-11-05 NOTE — PHARMACY-ANTICOAGULATION SERVICE
Clinical Pharmacy - Warfarin Dosing Consult     Pharmacy has been consulted to manage this patient s warfarin therapy.  Indication: Mechanical Mitral Valve Replacement  Therapy Goal: INR 2.5-3.5  Warfarin Prior to Admission: Yes  Warfarin PTA Regimen: 5 mg daily  Recent documented change in oral intake/nutrition: Unknown  Dose Comments: INR 6.85 and has already taken dose PTA. No more warfarin today    INR   Date Value Ref Range Status   11/04/2021 6.85 (HH) 0.85 - 1.15 Final   05/13/2013 2.64 (H) 0.86 - 1.14 Final       Recommend warfarin 0 mg today (Had 5 mg today PTA) .  Pharmacy will monitor Celia Staplesankur daily and order warfarin doses to achieve specified goal.      Please contact pharmacy as soon as possible if the warfarin needs to be held for a procedure or if the warfarin goals change.

## 2021-11-05 NOTE — PROGRESS NOTES
Cross cover notified of both sets of blood cultures from admission yesterday afternoon are positive for GPC's in pairs and chains.  Currently receiving IV vancomycin for concerns for possible discitis.  ID has been consulted and will see the patient today.  -Continue IV vancomycin  -Add IV ceftriaxone 2 g every 24 hours for osteomyelitis dosing
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
## 2021-11-05 NOTE — PRE-PROCEDURE
GENERAL PRE-PROCEDURE:   Procedure:  DARRIN  Date/Time:  11/5/2021 2:30 PM    Written consent obtained?: Yes    Risks and benefits: Risks, benefits and alternatives were discussed    Consent given by:  Patient  Patient states understanding of procedure being performed: Yes    Patient's understanding of procedure matches consent: Yes    Procedure consent matches procedure scheduled: Yes    Expected level of sedation:  Moderate  Appropriately NPO:  Yes  Mallampati  :  Grade 3- soft palate visible, posterior pharyngeal wall not visible  Lungs:  Lungs clear with good breath sounds bilaterally  Heart:  Normal heart sounds and rate and systolic murmur  History & Physical reviewed:  History and physical reviewed and no updates needed  Statement of review:  I have reviewed the lab findings, diagnostic data, medications, and the plan for sedation

## 2021-11-05 NOTE — PLAN OF CARE
PT: Orders received. Chart reviewed and discussed with care team.  PT not indicated due to likely need for transfer to another hospital with cardiovascular surgery.  Will complete orders at this time

## 2021-11-05 NOTE — PLAN OF CARE
Patient admitted with back pain, VSS, patient had significant pain in lumber region, pain medications AND ICE given, patient not surgical, per note, patient to had I& D consult.  Patient straight cathed x1 for greater than 700 see flow sheet.  No other significant requests will cont to monitor.

## 2021-11-06 ENCOUNTER — HOSPITAL ENCOUNTER (INPATIENT)
Facility: CLINIC | Age: 76
LOS: 6 days | Discharge: NURSING, REHAB OR LONG-TERM CARE | DRG: 314 | End: 2021-11-12
Attending: HOSPITALIST | Admitting: STUDENT IN AN ORGANIZED HEALTH CARE EDUCATION/TRAINING PROGRAM
Payer: COMMERCIAL

## 2021-11-06 VITALS
OXYGEN SATURATION: 97 % | TEMPERATURE: 97.4 F | SYSTOLIC BLOOD PRESSURE: 140 MMHG | BODY MASS INDEX: 30.36 KG/M2 | RESPIRATION RATE: 16 BRPM | HEART RATE: 65 BPM | WEIGHT: 205 LBS | HEIGHT: 69 IN | DIASTOLIC BLOOD PRESSURE: 57 MMHG

## 2021-11-06 DIAGNOSIS — K59.03 DRUG-INDUCED CONSTIPATION: ICD-10-CM

## 2021-11-06 DIAGNOSIS — M46.46 DISCITIS OF LUMBAR REGION: ICD-10-CM

## 2021-11-06 DIAGNOSIS — M46.46 DISCITIS OF LUMBAR REGION: Primary | ICD-10-CM

## 2021-11-06 PROBLEM — I38 PROSTHETIC VALVE ENDOCARDITIS (H): Status: ACTIVE | Noted: 2021-11-06

## 2021-11-06 PROBLEM — T82.6XXA PROSTHETIC VALVE ENDOCARDITIS (H): Status: ACTIVE | Noted: 2021-11-06

## 2021-11-06 PROBLEM — I38 ENDOCARDITIS: Status: ACTIVE | Noted: 2021-11-06

## 2021-11-06 PROBLEM — A41.81 ENTEROCOCCAL SEPSIS (H): Status: ACTIVE | Noted: 2021-11-06

## 2021-11-06 LAB
ALBUMIN UR-MCNC: NEGATIVE MG/DL
ANION GAP SERPL CALCULATED.3IONS-SCNC: 5 MMOL/L (ref 3–14)
APPEARANCE UR: CLEAR
BACTERIA #/AREA URNS HPF: ABNORMAL /HPF
BASOPHILS # BLD AUTO: 0 10E3/UL (ref 0–0.2)
BASOPHILS NFR BLD AUTO: 1 %
BILIRUB UR QL STRIP: NEGATIVE
BUN SERPL-MCNC: 17 MG/DL (ref 7–30)
CALCIUM SERPL-MCNC: 9 MG/DL (ref 8.5–10.1)
CHLORIDE BLD-SCNC: 109 MMOL/L (ref 94–109)
CO2 SERPL-SCNC: 24 MMOL/L (ref 20–32)
COLOR UR AUTO: ABNORMAL
CREAT SERPL-MCNC: 0.83 MG/DL (ref 0.66–1.25)
EOSINOPHIL # BLD AUTO: 0.2 10E3/UL (ref 0–0.7)
EOSINOPHIL NFR BLD AUTO: 3 %
ERYTHROCYTE [DISTWIDTH] IN BLOOD BY AUTOMATED COUNT: 13.1 % (ref 10–15)
GFR SERPL CREATININE-BSD FRML MDRD: 84 ML/MIN/1.73M2
GLUCOSE BLD-MCNC: 104 MG/DL (ref 70–99)
GLUCOSE UR STRIP-MCNC: NEGATIVE MG/DL
HCT VFR BLD AUTO: 37.5 % (ref 40–53)
HGB BLD-MCNC: 11.6 G/DL (ref 13.3–17.7)
HGB UR QL STRIP: NEGATIVE
IMM GRANULOCYTES # BLD: 0.2 10E3/UL
IMM GRANULOCYTES NFR BLD: 2 %
INR PPP: 4.71 (ref 0.85–1.15)
KETONES UR STRIP-MCNC: NEGATIVE MG/DL
LEUKOCYTE ESTERASE UR QL STRIP: ABNORMAL
LYMPHOCYTES # BLD AUTO: 1 10E3/UL (ref 0.8–5.3)
LYMPHOCYTES NFR BLD AUTO: 12 %
MCH RBC QN AUTO: 28.6 PG (ref 26.5–33)
MCHC RBC AUTO-ENTMCNC: 30.9 G/DL (ref 31.5–36.5)
MCV RBC AUTO: 92 FL (ref 78–100)
MONOCYTES # BLD AUTO: 0.6 10E3/UL (ref 0–1.3)
MONOCYTES NFR BLD AUTO: 8 %
MUCOUS THREADS #/AREA URNS LPF: PRESENT /LPF
NEUTROPHILS # BLD AUTO: 6.2 10E3/UL (ref 1.6–8.3)
NEUTROPHILS NFR BLD AUTO: 74 %
NITRATE UR QL: NEGATIVE
NRBC # BLD AUTO: 0 10E3/UL
NRBC BLD AUTO-RTO: 0 /100
PH UR STRIP: 6.5 [PH] (ref 5–7)
PLATELET # BLD AUTO: 317 10E3/UL (ref 150–450)
POTASSIUM BLD-SCNC: 4.4 MMOL/L (ref 3.4–5.3)
RBC # BLD AUTO: 4.06 10E6/UL (ref 4.4–5.9)
RBC URINE: 2 /HPF
SODIUM SERPL-SCNC: 138 MMOL/L (ref 133–144)
SP GR UR STRIP: 1.02 (ref 1–1.03)
UROBILINOGEN UR STRIP-MCNC: NORMAL MG/DL
VANCOMYCIN SERPL-MCNC: 4 MG/L
WBC # BLD AUTO: 8.3 10E3/UL (ref 4–11)
WBC URINE: 14 /HPF

## 2021-11-06 PROCEDURE — 80202 ASSAY OF VANCOMYCIN: CPT | Performed by: INTERNAL MEDICINE

## 2021-11-06 PROCEDURE — 85610 PROTHROMBIN TIME: CPT | Performed by: INTERNAL MEDICINE

## 2021-11-06 PROCEDURE — 80048 BASIC METABOLIC PNL TOTAL CA: CPT | Performed by: INTERNAL MEDICINE

## 2021-11-06 PROCEDURE — 87086 URINE CULTURE/COLONY COUNT: CPT | Performed by: INTERNAL MEDICINE

## 2021-11-06 PROCEDURE — 99239 HOSP IP/OBS DSCHRG MGMT >30: CPT | Performed by: INTERNAL MEDICINE

## 2021-11-06 PROCEDURE — 85025 COMPLETE CBC W/AUTO DIFF WBC: CPT | Performed by: INTERNAL MEDICINE

## 2021-11-06 PROCEDURE — 210N000002 HC R&B HEART CARE

## 2021-11-06 PROCEDURE — 250N000011 HC RX IP 250 OP 636: Performed by: STUDENT IN AN ORGANIZED HEALTH CARE EDUCATION/TRAINING PROGRAM

## 2021-11-06 PROCEDURE — 81001 URINALYSIS AUTO W/SCOPE: CPT | Performed by: INTERNAL MEDICINE

## 2021-11-06 PROCEDURE — 250N000013 HC RX MED GY IP 250 OP 250 PS 637: Performed by: STUDENT IN AN ORGANIZED HEALTH CARE EDUCATION/TRAINING PROGRAM

## 2021-11-06 PROCEDURE — 87077 CULTURE AEROBIC IDENTIFY: CPT | Performed by: INTERNAL MEDICINE

## 2021-11-06 PROCEDURE — 250N000013 HC RX MED GY IP 250 OP 250 PS 637: Performed by: INTERNAL MEDICINE

## 2021-11-06 PROCEDURE — 36415 COLL VENOUS BLD VENIPUNCTURE: CPT | Performed by: INTERNAL MEDICINE

## 2021-11-06 PROCEDURE — 99233 SBSQ HOSP IP/OBS HIGH 50: CPT | Performed by: STUDENT IN AN ORGANIZED HEALTH CARE EDUCATION/TRAINING PROGRAM

## 2021-11-06 PROCEDURE — 250N000011 HC RX IP 250 OP 636: Performed by: INTERNAL MEDICINE

## 2021-11-06 RX ORDER — AMPICILLIN 2 G/1
2 INJECTION, POWDER, FOR SOLUTION INTRAVENOUS EVERY 4 HOURS
Status: DISCONTINUED | OUTPATIENT
Start: 2021-11-06 | End: 2021-11-06 | Stop reason: HOSPADM

## 2021-11-06 RX ORDER — TAMSULOSIN HYDROCHLORIDE 0.4 MG/1
0.4 CAPSULE ORAL DAILY
Status: DISCONTINUED | OUTPATIENT
Start: 2021-11-07 | End: 2021-11-12 | Stop reason: HOSPADM

## 2021-11-06 RX ORDER — METOPROLOL SUCCINATE 25 MG/1
25 TABLET, EXTENDED RELEASE ORAL DAILY
Status: DISCONTINUED | OUTPATIENT
Start: 2021-11-07 | End: 2021-11-12 | Stop reason: HOSPADM

## 2021-11-06 RX ORDER — PROCHLORPERAZINE MALEATE 5 MG
5 TABLET ORAL EVERY 6 HOURS PRN
Status: DISCONTINUED | OUTPATIENT
Start: 2021-11-06 | End: 2021-11-12 | Stop reason: HOSPADM

## 2021-11-06 RX ORDER — ACETAMINOPHEN 500 MG
1000 TABLET ORAL
Status: DISCONTINUED | OUTPATIENT
Start: 2021-11-06 | End: 2021-11-12 | Stop reason: HOSPADM

## 2021-11-06 RX ORDER — PROCHLORPERAZINE 25 MG
12.5 SUPPOSITORY, RECTAL RECTAL EVERY 12 HOURS PRN
Status: DISCONTINUED | OUTPATIENT
Start: 2021-11-06 | End: 2021-11-12 | Stop reason: HOSPADM

## 2021-11-06 RX ORDER — ATORVASTATIN CALCIUM 20 MG/1
20 TABLET, FILM COATED ORAL DAILY
Status: DISCONTINUED | OUTPATIENT
Start: 2021-11-06 | End: 2021-11-06 | Stop reason: HOSPADM

## 2021-11-06 RX ORDER — ONDANSETRON 2 MG/ML
4 INJECTION INTRAMUSCULAR; INTRAVENOUS EVERY 6 HOURS PRN
Status: DISCONTINUED | OUTPATIENT
Start: 2021-11-06 | End: 2021-11-12 | Stop reason: HOSPADM

## 2021-11-06 RX ORDER — AMOXICILLIN 250 MG
2 CAPSULE ORAL 2 TIMES DAILY PRN
Status: DISCONTINUED | OUTPATIENT
Start: 2021-11-06 | End: 2021-11-12 | Stop reason: HOSPADM

## 2021-11-06 RX ORDER — ONDANSETRON 4 MG/1
4 TABLET, ORALLY DISINTEGRATING ORAL EVERY 6 HOURS PRN
Status: DISCONTINUED | OUTPATIENT
Start: 2021-11-06 | End: 2021-11-12 | Stop reason: HOSPADM

## 2021-11-06 RX ORDER — AMOXICILLIN 250 MG
1 CAPSULE ORAL 2 TIMES DAILY PRN
Status: DISCONTINUED | OUTPATIENT
Start: 2021-11-06 | End: 2021-11-12 | Stop reason: HOSPADM

## 2021-11-06 RX ORDER — LIDOCAINE 40 MG/G
CREAM TOPICAL
Status: DISCONTINUED | OUTPATIENT
Start: 2021-11-06 | End: 2021-11-12 | Stop reason: HOSPADM

## 2021-11-06 RX ORDER — FINASTERIDE 5 MG/1
5 TABLET, FILM COATED ORAL DAILY
Status: DISCONTINUED | OUTPATIENT
Start: 2021-11-07 | End: 2021-11-12 | Stop reason: HOSPADM

## 2021-11-06 RX ORDER — CEFTRIAXONE 2 G/1
2 INJECTION, POWDER, FOR SOLUTION INTRAMUSCULAR; INTRAVENOUS EVERY 12 HOURS
Status: DISCONTINUED | OUTPATIENT
Start: 2021-11-06 | End: 2021-11-12 | Stop reason: HOSPADM

## 2021-11-06 RX ORDER — CEFTRIAXONE 2 G/1
2 INJECTION, POWDER, FOR SOLUTION INTRAMUSCULAR; INTRAVENOUS EVERY 24 HOURS
Status: DISCONTINUED | OUTPATIENT
Start: 2021-11-06 | End: 2021-11-06

## 2021-11-06 RX ORDER — AMPICILLIN 2 G/1
2 INJECTION, POWDER, FOR SOLUTION INTRAVENOUS EVERY 4 HOURS
Status: DISCONTINUED | OUTPATIENT
Start: 2021-11-06 | End: 2021-11-12 | Stop reason: HOSPADM

## 2021-11-06 RX ORDER — ACETAMINOPHEN 650 MG/1
650 SUPPOSITORY RECTAL EVERY 6 HOURS PRN
Status: DISCONTINUED | OUTPATIENT
Start: 2021-11-06 | End: 2021-11-06

## 2021-11-06 RX ORDER — POLYETHYLENE GLYCOL 3350 17 G/17G
17 POWDER, FOR SOLUTION ORAL DAILY PRN
Status: DISCONTINUED | OUTPATIENT
Start: 2021-11-06 | End: 2021-11-12 | Stop reason: HOSPADM

## 2021-11-06 RX ORDER — ACETAMINOPHEN 325 MG/1
650 TABLET ORAL EVERY 6 HOURS PRN
Status: DISCONTINUED | OUTPATIENT
Start: 2021-11-06 | End: 2021-11-06

## 2021-11-06 RX ORDER — LOSARTAN POTASSIUM 100 MG/1
100 TABLET ORAL DAILY
Status: DISCONTINUED | OUTPATIENT
Start: 2021-11-07 | End: 2021-11-12 | Stop reason: HOSPADM

## 2021-11-06 RX ORDER — CEFTRIAXONE 2 G/1
2 INJECTION, POWDER, FOR SOLUTION INTRAMUSCULAR; INTRAVENOUS EVERY 12 HOURS
Status: DISCONTINUED | OUTPATIENT
Start: 2021-11-06 | End: 2021-11-06 | Stop reason: HOSPADM

## 2021-11-06 RX ORDER — GABAPENTIN 100 MG/1
100 CAPSULE ORAL DAILY
Status: DISCONTINUED | OUTPATIENT
Start: 2021-11-07 | End: 2021-11-09

## 2021-11-06 RX ORDER — LOSARTAN POTASSIUM 100 MG/1
100 TABLET ORAL DAILY
Status: DISCONTINUED | OUTPATIENT
Start: 2021-11-06 | End: 2021-11-06 | Stop reason: HOSPADM

## 2021-11-06 RX ADMIN — METOPROLOL SUCCINATE 25 MG: 25 TABLET, EXTENDED RELEASE ORAL at 08:13

## 2021-11-06 RX ADMIN — CEFTRIAXONE SODIUM 2 G: 2 INJECTION, POWDER, FOR SOLUTION INTRAMUSCULAR; INTRAVENOUS at 20:32

## 2021-11-06 RX ADMIN — HYDROMORPHONE HYDROCHLORIDE 1 MG: 2 TABLET ORAL at 17:29

## 2021-11-06 RX ADMIN — ATORVASTATIN CALCIUM 20 MG: 20 TABLET, FILM COATED ORAL at 11:07

## 2021-11-06 RX ADMIN — FINASTERIDE 5 MG: 5 TABLET, FILM COATED ORAL at 08:14

## 2021-11-06 RX ADMIN — CEFTRIAXONE 2 G: 2 INJECTION, POWDER, FOR SOLUTION INTRAMUSCULAR; INTRAVENOUS at 06:35

## 2021-11-06 RX ADMIN — GABAPENTIN 100 MG: 100 CAPSULE ORAL at 08:14

## 2021-11-06 RX ADMIN — ACETAMINOPHEN 650 MG: 325 TABLET, FILM COATED ORAL at 10:23

## 2021-11-06 RX ADMIN — ACETAMINOPHEN 650 MG: 325 TABLET, FILM COATED ORAL at 17:27

## 2021-11-06 RX ADMIN — TAMSULOSIN HYDROCHLORIDE 0.4 MG: 0.4 CAPSULE ORAL at 08:13

## 2021-11-06 RX ADMIN — LOSARTAN POTASSIUM 100 MG: 100 TABLET, FILM COATED ORAL at 11:07

## 2021-11-06 RX ADMIN — HYDROMORPHONE HYDROCHLORIDE 1 MG: 2 TABLET ORAL at 10:23

## 2021-11-06 RX ADMIN — AMPICILLIN SODIUM 2 G: 2 INJECTION, POWDER, FOR SOLUTION INTRAVENOUS at 21:16

## 2021-11-06 RX ADMIN — ACETAMINOPHEN 1000 MG: 500 TABLET, FILM COATED ORAL at 20:37

## 2021-11-06 ASSESSMENT — ACTIVITIES OF DAILY LIVING (ADL)
ADLS_ACUITY_SCORE: 12
ADLS_ACUITY_SCORE: 10
ADLS_ACUITY_SCORE: 12
ADLS_ACUITY_SCORE: 10
ADLS_ACUITY_SCORE: 12
ADLS_ACUITY_SCORE: 10
ADLS_ACUITY_SCORE: 12
ADLS_ACUITY_SCORE: 10
ADLS_ACUITY_SCORE: 12

## 2021-11-06 NOTE — PROGRESS NOTES
Pt transferring to [facility]  Heart centre Rm 270, all personal belongings, including shoes, cell phone with , c pap machine, wallet and home medications were taken by patient.Meds were in a sealed envelope.Condition stable.Left unit via stretcher with medical transportation.  Report was already called to receiving unit by day shift RN.

## 2021-11-06 NOTE — PLAN OF CARE
VS-stable, afebrile,   Lung Sounds-clear, no cough, on room air,   O2-on room air.   GI-+Bs, +flatus.lbm was Wednesday. Tolerating reg diet. Denies nausea. Did not eat lunch, but had bkst    -pt st caths at home for the prior 2 weeks before coming into the hospital. UA ordered and sent offf at 1330. St cathed at 1330 for 450cc, UC now ordered. UA results are back  IVF-sl iv in between rocephin and vanco. Vanco level to be drawn at 1500 today.   Dressings-none.   CMS-denies numbness and tingling,  +pp, strong dorsi/planter flexion. Warm.   Drain-none.   Activity-up with 2 max assist, gb, walker, able to walk about 6 steps in the room, very slow, unsteady. Increased pain when up.   Pain-rates pain a 6 with moving, down to a 3 at rest. Tylenol and dilaudid po. Lidocaine patches removed this am.   D/C Plan-hospitalist, cardiology, and ID following. Working on Plan to transfer.
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
## 2021-11-06 NOTE — PLAN OF CARE
Pt Ox4 but often confused. VS WDL with HTN on room air. Denies pain. INR: 5.91; coumadin held previous shift. Bladder scanned and straight cath'd for 800 mL. Heavy assist of 2 w/ GBW. Regular diet. Plans to transfer to [facility] this morning due to vegetation on mechanical mitral valve per DARRIN.

## 2021-11-06 NOTE — PHARMACY-VANCOMYCIN DOSING SERVICE
Vancomycin was discontinued by ID physician at the same time when this note was written. The vancomycin 1500 mg dose was subsequently discontinued. - Harriet Morfin    -------------------------------  Pharmacy Vancomycin Note  Date of Service 2021  Patient's  1945   76 year old, male    Indication: Bone and Joint Infection  Day of Therapy: 3  Current vancomycin regimen:  1750 mg IV q24h  Current vancomycin monitoring method: AUC  Current vancomycin therapeutic monitoring goal: 400-600 mg*h/L    Current estimated CrCl = Estimated Creatinine Clearance: 82.6 mL/min (based on SCr of 0.83 mg/dL).    Creatinine for last 3 days  2021: 11:17 AM Creatinine 0.98 mg/dL  2021:  6:07 AM Creatinine 0.83 mg/dL    Recent Vancomycin Levels (past 3 days)  2021:  3:46 PM Vancomycin 4.0 mg/L    Vancomycin IV Administrations (past 72 hours)                   vancomycin (VANCOCIN) 1,750 mg in sodium chloride 0.9 % 500 mL intermittent infusion (mg) 1,750 mg Given 21 1611    vancomycin (VANCOCIN) 2,000 mg in sodium chloride 0.9 % 500 mL intermittent infusion (mg) 2,000 mg New Bag 21 1612                Nephrotoxins and other renal medications (From now, onward)    Start     Dose/Rate Route Frequency Ordered Stop    21 1730  vancomycin 1500 mg in 0.9% NaCl 250 ml intermittent infusion 1,500 mg      1,500 mg  over 90 Minutes Intravenous EVERY 12 HOURS 21 1700      21 1700  ampicillin (OMNIPEN) 2 g vial to attach to  mL bag      2 g  over 15 Minutes Intravenous EVERY 4 HOURS 21 1657               Contrast Orders - past 72 hours (72h ago, onward)    None          Interpretation of levels and current regimen:  Vancomycin level is reflective of AUC less than 400    Has serum creatinine changed greater than 50% in last 72 hours: No    Urine output:  unable to determine    Renal Function: Stable    Loading dose: 2000 mg x1 at 1612 21   Regimen: 1750 mg IV every 24  hours.  Start time: 17:00 on 11/06/2021  Exposure target: AUC24 (range)400-600 mg/L.hr   AUC24,ss: 330 mg/L.hr  Probability of AUC24 > 400: 17 %  Ctrough,ss: 6.4 mg/L  Probability of Ctrough,ss > 20: 0 %  Probability of nephrotoxicity (Lodise STORMY 2009): 4 %    Plan:  1. Increase Dose to 1500 mg q12h  2. Vancomycin monitoring method: AUC  3. Vancomycin therapeutic monitoring goal: 400-600 mg*h/L  4. Pharmacy will check vancomycin levels as appropriate in 1-3 Days.  5. Serum creatinine levels will be ordered daily for the first week of therapy and at least twice weekly for subsequent weeks.    Navjot Lozano, [facility]

## 2021-11-06 NOTE — PROGRESS NOTES
Please see the formal report of the transesophageal echocardiogram. A small mobile mass was seen on the medial side of the mitral valve sewing ring on the atrial side which was not evident on the live study. However when reviewing the captured images and with time to be able to go backwards and forwards through the frames this tiny mass was evident in some views and this is suspicious for a vegetation on the mechanical mitral valve prosthesis. Interestingly, this was not seen on the 3D images. No vegetation was seen on the aortic valve prosthesis nor were any vegetation seen on the tricuspid and pulmonary native valves. As was suspected by the infectious disease physicians there does appear to be seeding at least of the mitral valve. Disposition per infectious disease and internal medicine but infectious disease have recommended referring this patient to a hospital with cardiovascular surgery present. This patient had his valves replaced at [facility] and he does follow with a cardiologist in the [facility] system. This would appear to be the appropriate hospital for him to be transferred to as per infectious disease and hospitalist service.

## 2021-11-06 NOTE — PROVIDER NOTIFICATION
11/06/21 0846   Critical Test Results/Notification   Critical Lab Result (Lab Name and Value) gram positive cocci in pairs and chains in R arm and in L hand both drawn on 11/5/2021   R arm at 11:09 on 11/5/2021   L hand at 11:16 on 11/5/2021  Paged Dr. Marsh of results  DATE:  11/6/2021   TIME OF RECEIPT FROM LAB:  0847  LAB TEST:  Blood cultures from 11/5/2021 are gram positive cocci in pairs and chains  LAB VALUE:  Gm positive cocci in pairs and chains  RESULTS GIVEN WITH READ-BACK TO (PROVIDER):  Mckinley Marsh MD, MD  TIME LAB VALUE REPORTED TO PROVIDER:   0886

## 2021-11-06 NOTE — PLAN OF CARE
Pt confused to situation, plan of care reviewed with pt and son.  Vital sign stable.  Lungs diminished in bases.  Ambulated with walker, gait belt and  2 assist.  On iv  vanco  and Rocephin.  Po dilaudid for back pain, ice pack application.  Bladder scanned for 227 @ 9.30 pm.No urge to void.

## 2021-11-06 NOTE — PROGRESS NOTES
"Note Infectious Disease Consult Service Progress Note  Covering for Luis Carlos Green & Nga   Pt Name Celia Stallings   Date 11/06/2021   MRN [id number]     Notes / labs / imaging test results and other data were reviewed    CHIEF COMPLAINT: REASON FOR VISIT    Subtle cognitive changes, new back pain    HPI    75 yo s/p AVR and MVR   Now w Enterococcal sepsis / presumed prosthetic valve endocarditis    11/6/2021  INTERIM UPDATE    About same  No new pain  No new shortness of breath  No new joint troubles  No new headache  No trouble related to antibiotic that he can tell    Remainder of 14-point ROS was negative except as listed above    PFSH:  Personal / Family / Social Histories were reviewed and updated  nil new  Vital Signs: BP (!) 140/52 (BP Location: Right arm)   Pulse 62   Temp 98  F (36.7  C) (Oral)   Resp 16   Ht 1.753 m (5' 9\")   Wt 93 kg (205 lb)   SpO2 97%   BMI 30.27 kg/m    EXAM    gen   In bed  - no acute distress      lungs   Non labored respirations     CV   Irregular, soft murmur, distinct heart tones     neuro   Nl conversation  Moves all limbs     abd   Soft, not tender     skin   No peripheral stigmata of infective endocarditis    psyche   calm, coherent, cooperative, appropriate         Data  Microbiology    ! = positive test result        Imaging  11/4 MRI  Abnormal endplate edema asymmetric towards the left at L2-L3. There  is also increased T2 signal within the disc at this level anteriorly  and towards the left. While these may all be degenerative in etiology,  early discitis is not excluded.    LABS  Lab Results   Component Value Date/Time    WBC 8.3 11/06/2021 06:07 AM    WBC 8.3 11/04/2021 11:17 AM    WBC 6.3 05/13/2013 10:54 PM    AST 30 11/04/2021 11:17 AM    ALT 42 11/04/2021 11:17 AM    ALKPHOS 60 11/04/2021 11:17 AM           ASSESSMENT & SUGGESTIONS  Patient Active Problem List   Diagnosis Code     Discitis, unspecified spinal region M46.40     Prosthetic valve " endocarditis (H) T82.6XXA, I38     Enterococcal sepsis (H) A41.81     Discitis of lumbar region M46.46      Clinically stable  Blood isolate S in vitro to ampicillin  No evidence new complication of discitis (distal neuro compromise) or endocarditis (worse heart failure, systemic emboli, immunologic consequences)    Change to ampicillin + q12h ceftriaxone (for enterococcal endocarditis)  Decisions re if/when valve surgery per CV surgery  Monitor spine           Angel Bettencourt MD  Covering for Sameer Ambrocio & Luis Carlos  Infectious Disease service    CURRENT MED REVIEWD  Current Facility-Administered Medications   Medication     acetaminophen (TYLENOL) tablet 650 mg    Or     acetaminophen (TYLENOL) Suppository 650 mg     atorvastatin (LIPITOR) tablet 20 mg     benzocaine 20% (HURRICAINE/TOPEX) 20 % spray 0.5-2 mL     cefTRIAXone (ROCEPHIN) 2 g vial to attach to  ml bag for ADULTS or NS 50 ml bag for PEDS     sodium chloride (PF) 0.9% PF flush 9.5 mL    Or     dextrose 50 % injection 9.5 mL     fentaNYL (PF) (SUBLIMAZE) injection 25 mcg     finasteride (PROSCAR) tablet 5 mg     flumazenil (ROMAZICON) injection 0.2 mg     gabapentin (NEURONTIN) capsule 100 mg     HYDROmorphone (DILAUDID) half-tab 1 mg     HYDROmorphone (DILAUDID) injection 0.2 mg     Lidocaine (LIDOCARE) 4 % Patch 1 patch    And     lidocaine patch in PLACE     lidocaine (LMX4) cream     lidocaine (LMX4) cream     lidocaine (XYLOCAINE) 5 % ointment    Or     lidocaine (XYLOCAINE) 4 % solution 1.5 mL     lidocaine 1 % 0.1-1 mL     lidocaine 1 % 1 mL     losartan (COZAAR) tablet 100 mg     melatonin tablet 1 mg     metoprolol succinate ER (TOPROL-XL) 24 hr tablet 25 mg     midazolam (VERSED) injection 0.5 mg     naloxone (NARCAN) injection 0.2 mg     naloxone (NARCAN) injection 0.2 mg     naloxone (NARCAN) injection 0.2 mg    Or     naloxone (NARCAN) injection 0.4 mg    Or     naloxone (NARCAN) injection 0.2 mg    Or     naloxone (NARCAN) injection 0.4 mg      naloxone (NARCAN) injection 0.4 mg     naloxone (NARCAN) injection 0.4 mg     ondansetron (ZOFRAN-ODT) ODT tab 4 mg    Or     ondansetron (ZOFRAN) injection 4 mg     Patient is already receiving anticoagulation with heparin, enoxaparin (LOVENOX), warfarin (COUMADIN)  or other anticoagulant medication     sodium chloride (PF) 0.9% PF flush 3 mL     sodium chloride (PF) 0.9% PF flush 3 mL     tamsulosin (FLOMAX) capsule 0.4 mg     vancomycin (VANCOCIN) 1,750 mg in sodium chloride 0.9 % 500 mL intermittent infusion     Warfarin Therapy Reminder (Check START DATE - warfarin may be starting in the FUTURE)     warfarin-No DOSE today

## 2021-11-07 LAB
ALBUMIN SERPL-MCNC: 2.1 G/DL (ref 3.4–5)
ALP SERPL-CCNC: 61 U/L (ref 40–150)
ALT SERPL W P-5'-P-CCNC: 41 U/L (ref 0–70)
ANION GAP SERPL CALCULATED.3IONS-SCNC: 4 MMOL/L (ref 3–14)
AST SERPL W P-5'-P-CCNC: 30 U/L (ref 0–45)
BACTERIA BLD CULT: ABNORMAL
BILIRUB SERPL-MCNC: 0.3 MG/DL (ref 0.2–1.3)
BUN SERPL-MCNC: 15 MG/DL (ref 7–30)
CALCIUM SERPL-MCNC: 8.9 MG/DL (ref 8.5–10.1)
CHLORIDE BLD-SCNC: 110 MMOL/L (ref 94–109)
CO2 SERPL-SCNC: 24 MMOL/L (ref 20–32)
CREAT SERPL-MCNC: 0.73 MG/DL (ref 0.66–1.25)
ERYTHROCYTE [DISTWIDTH] IN BLOOD BY AUTOMATED COUNT: 13.2 % (ref 10–15)
GFR SERPL CREATININE-BSD FRML MDRD: 89 ML/MIN/1.73M2
GLUCOSE BLD-MCNC: 112 MG/DL (ref 70–99)
HCT VFR BLD AUTO: 34.9 % (ref 40–53)
HGB BLD-MCNC: 11.5 G/DL (ref 13.3–17.7)
INR PPP: 3 (ref 0.85–1.15)
MCH RBC QN AUTO: 29.6 PG (ref 26.5–33)
MCHC RBC AUTO-ENTMCNC: 33 G/DL (ref 31.5–36.5)
MCV RBC AUTO: 90 FL (ref 78–100)
PLATELET # BLD AUTO: 319 10E3/UL (ref 150–450)
POTASSIUM BLD-SCNC: 4.5 MMOL/L (ref 3.4–5.3)
PROT SERPL-MCNC: 6 G/DL (ref 6.8–8.8)
RBC # BLD AUTO: 3.89 10E6/UL (ref 4.4–5.9)
SODIUM SERPL-SCNC: 138 MMOL/L (ref 133–144)
WBC # BLD AUTO: 8.4 10E3/UL (ref 4–11)

## 2021-11-07 PROCEDURE — 85610 PROTHROMBIN TIME: CPT | Performed by: STUDENT IN AN ORGANIZED HEALTH CARE EDUCATION/TRAINING PROGRAM

## 2021-11-07 PROCEDURE — 250N000013 HC RX MED GY IP 250 OP 250 PS 637: Performed by: STUDENT IN AN ORGANIZED HEALTH CARE EDUCATION/TRAINING PROGRAM

## 2021-11-07 PROCEDURE — 250N000013 HC RX MED GY IP 250 OP 250 PS 637: Performed by: INTERNAL MEDICINE

## 2021-11-07 PROCEDURE — 99222 1ST HOSP IP/OBS MODERATE 55: CPT | Performed by: SURGERY

## 2021-11-07 PROCEDURE — 250N000011 HC RX IP 250 OP 636: Performed by: STUDENT IN AN ORGANIZED HEALTH CARE EDUCATION/TRAINING PROGRAM

## 2021-11-07 PROCEDURE — 99233 SBSQ HOSP IP/OBS HIGH 50: CPT | Performed by: INTERNAL MEDICINE

## 2021-11-07 PROCEDURE — 210N000002 HC R&B HEART CARE

## 2021-11-07 PROCEDURE — 87077 CULTURE AEROBIC IDENTIFY: CPT | Performed by: STUDENT IN AN ORGANIZED HEALTH CARE EDUCATION/TRAINING PROGRAM

## 2021-11-07 PROCEDURE — 85014 HEMATOCRIT: CPT | Performed by: STUDENT IN AN ORGANIZED HEALTH CARE EDUCATION/TRAINING PROGRAM

## 2021-11-07 PROCEDURE — 99232 SBSQ HOSP IP/OBS MODERATE 35: CPT | Mod: 25 | Performed by: INTERNAL MEDICINE

## 2021-11-07 PROCEDURE — 93005 ELECTROCARDIOGRAM TRACING: CPT

## 2021-11-07 PROCEDURE — 250N000013 HC RX MED GY IP 250 OP 250 PS 637: Performed by: HOSPITALIST

## 2021-11-07 PROCEDURE — 36415 COLL VENOUS BLD VENIPUNCTURE: CPT | Performed by: STUDENT IN AN ORGANIZED HEALTH CARE EDUCATION/TRAINING PROGRAM

## 2021-11-07 PROCEDURE — 80053 COMPREHEN METABOLIC PANEL: CPT | Performed by: STUDENT IN AN ORGANIZED HEALTH CARE EDUCATION/TRAINING PROGRAM

## 2021-11-07 RX ORDER — CYCLOBENZAPRINE HCL 5 MG
10 TABLET ORAL EVERY 8 HOURS PRN
Status: DISCONTINUED | OUTPATIENT
Start: 2021-11-07 | End: 2021-11-09

## 2021-11-07 RX ORDER — OXYCODONE HYDROCHLORIDE 5 MG/1
5 TABLET ORAL EVERY 6 HOURS PRN
Status: DISCONTINUED | OUTPATIENT
Start: 2021-11-07 | End: 2021-11-08

## 2021-11-07 RX ORDER — NALOXONE HYDROCHLORIDE 0.4 MG/ML
0.2 INJECTION, SOLUTION INTRAMUSCULAR; INTRAVENOUS; SUBCUTANEOUS
Status: DISCONTINUED | OUTPATIENT
Start: 2021-11-07 | End: 2021-11-12 | Stop reason: HOSPADM

## 2021-11-07 RX ORDER — WARFARIN SODIUM 7.5 MG/1
7.5 TABLET ORAL
Status: COMPLETED | OUTPATIENT
Start: 2021-11-07 | End: 2021-11-07

## 2021-11-07 RX ORDER — ATORVASTATIN CALCIUM 20 MG/1
20 TABLET, FILM COATED ORAL DAILY
Status: DISCONTINUED | OUTPATIENT
Start: 2021-11-07 | End: 2021-11-12 | Stop reason: HOSPADM

## 2021-11-07 RX ORDER — NALOXONE HYDROCHLORIDE 0.4 MG/ML
0.4 INJECTION, SOLUTION INTRAMUSCULAR; INTRAVENOUS; SUBCUTANEOUS
Status: DISCONTINUED | OUTPATIENT
Start: 2021-11-07 | End: 2021-11-12 | Stop reason: HOSPADM

## 2021-11-07 RX ORDER — AMLODIPINE BESYLATE 2.5 MG/1
2.5 TABLET ORAL DAILY
Status: DISCONTINUED | OUTPATIENT
Start: 2021-11-07 | End: 2021-11-12 | Stop reason: HOSPADM

## 2021-11-07 RX ADMIN — CEFTRIAXONE SODIUM 2 G: 2 INJECTION, POWDER, FOR SOLUTION INTRAMUSCULAR; INTRAVENOUS at 08:24

## 2021-11-07 RX ADMIN — GABAPENTIN 100 MG: 100 CAPSULE ORAL at 08:23

## 2021-11-07 RX ADMIN — METOPROLOL SUCCINATE 25 MG: 25 TABLET, EXTENDED RELEASE ORAL at 08:23

## 2021-11-07 RX ADMIN — DICLOFENAC SODIUM 4 G: 10 GEL TOPICAL at 21:21

## 2021-11-07 RX ADMIN — ATORVASTATIN CALCIUM 20 MG: 20 TABLET, FILM COATED ORAL at 12:39

## 2021-11-07 RX ADMIN — ACETAMINOPHEN 1000 MG: 500 TABLET, FILM COATED ORAL at 20:22

## 2021-11-07 RX ADMIN — AMPICILLIN SODIUM 2 G: 2 INJECTION, POWDER, FOR SOLUTION INTRAVENOUS at 03:30

## 2021-11-07 RX ADMIN — AMPICILLIN SODIUM 2 G: 2 INJECTION, POWDER, FOR SOLUTION INTRAVENOUS at 20:55

## 2021-11-07 RX ADMIN — AMPICILLIN SODIUM 2 G: 2 INJECTION, POWDER, FOR SOLUTION INTRAVENOUS at 00:27

## 2021-11-07 RX ADMIN — WARFARIN SODIUM 7.5 MG: 7.5 TABLET ORAL at 17:10

## 2021-11-07 RX ADMIN — AMPICILLIN SODIUM 2 G: 2 INJECTION, POWDER, FOR SOLUTION INTRAVENOUS at 12:40

## 2021-11-07 RX ADMIN — OXYCODONE HYDROCHLORIDE 5 MG: 5 TABLET ORAL at 23:19

## 2021-11-07 RX ADMIN — FINASTERIDE 5 MG: 5 TABLET, FILM COATED ORAL at 08:23

## 2021-11-07 RX ADMIN — CEFTRIAXONE SODIUM 2 G: 2 INJECTION, POWDER, FOR SOLUTION INTRAMUSCULAR; INTRAVENOUS at 20:22

## 2021-11-07 RX ADMIN — TAMSULOSIN HYDROCHLORIDE 0.4 MG: 0.4 CAPSULE ORAL at 08:23

## 2021-11-07 RX ADMIN — Medication 1 MG: at 00:26

## 2021-11-07 RX ADMIN — AMPICILLIN SODIUM 2 G: 2 INJECTION, POWDER, FOR SOLUTION INTRAVENOUS at 08:24

## 2021-11-07 RX ADMIN — AMLODIPINE BESYLATE 2.5 MG: 2.5 TABLET ORAL at 12:39

## 2021-11-07 RX ADMIN — CYCLOBENZAPRINE 10 MG: 5 TABLET, FILM COATED ORAL at 20:22

## 2021-11-07 RX ADMIN — OXYCODONE HYDROCHLORIDE 5 MG: 5 TABLET ORAL at 17:10

## 2021-11-07 RX ADMIN — ACETAMINOPHEN 1000 MG: 500 TABLET, FILM COATED ORAL at 12:39

## 2021-11-07 RX ADMIN — AMPICILLIN SODIUM 2 G: 2 INJECTION, POWDER, FOR SOLUTION INTRAVENOUS at 17:11

## 2021-11-07 RX ADMIN — ACETAMINOPHEN 1000 MG: 500 TABLET, FILM COATED ORAL at 08:23

## 2021-11-07 RX ADMIN — LOSARTAN POTASSIUM 100 MG: 100 TABLET, FILM COATED ORAL at 08:23

## 2021-11-07 ASSESSMENT — ACTIVITIES OF DAILY LIVING (ADL)
ADLS_ACUITY_SCORE: 10
ADLS_ACUITY_SCORE: 11
ADLS_ACUITY_SCORE: 9
ADLS_ACUITY_SCORE: 10
ADLS_ACUITY_SCORE: 11
ADLS_ACUITY_SCORE: 10
ADLS_ACUITY_SCORE: 9
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
## 2021-11-07 NOTE — PLAN OF CARE
Pt was a strong assist of 2 to stand and try to use the commode, Pt unable to sit on the commode due to back pain, Pt self caths BID, good PO intake, Tele Junctional rhythm.

## 2021-11-07 NOTE — PROGRESS NOTES
X-cover    Blood culture positive for GPC in pairs and chains. Known enterococcal endocarditis. On ceftriaxone/ ampicillin. ID has been consulted already.     Joe Araujo MD

## 2021-11-07 NOTE — PROGRESS NOTES
Note Infectious Disease Consult Service Progress Note  Covering for [facility]   Pt Name Celia Stallings   Date 2021   MRN [id number]     Notes / labs / imaging test results and other data were reviewed    CHIEF COMPLAINT: REASON FOR VISIT    Subtle cognitive changes, new back pain    HPI    77 yo s/p AVR and MVR   Now w Enterococcal sepsis / presumed prosthetic valve endocarditis    2021  INTERIM UPDATE    Doing OK  No new joint / head pain  Back pain OK if supine, not moving, not good if tries to ambulate    Remainder of 14-point ROS was negative except as listed above    PFSH:  Personal / Family / Social Histories were reviewed and updated  nil new  Vital Signs: BP (!) 169/67 (BP Location: Left arm)   Pulse 60   Temp 98.2  F (36.8  C) (Oral)   Resp 13   SpO2 96%     Temp (48hrs), Av.7  F (36.5  C), Min:96.7  F (35.9  C), Max:98.4  F (36.9  C)     EXAM    gen   In bed  - no acute distress      lungs   Respiring w ease     CV   Irregular, soft murmur, distinct heart tones     neuro   Nl conversation  Moves all limbs     abd   Soft, not tender     skin   No peripheral stigmata of infective endocarditis    psyche   calm, coherent, cooperative, appropriate         Data  Microbiology    ! = positive test result        Imaging   MRI  Abnormal endplate edema asymmetric towards the left at L2-L3. There  is also increased T2 signal within the disc at this level anteriorly  and towards the left. While these may all be degenerative in etiology,  early discitis is not excluded.    LABS  Lab Results   Component Value Date/Time    WBC 8.4 2021 06:40 AM    WBC 8.3 2021 06:07 AM    WBC 8.3 2021 11:17 AM    WBC 6.3 2013 10:54 PM    AST 30 2021 06:40 AM    AST 30 2021 11:17 AM    ALT 41 2021 06:40 AM    ALT 42 2021 11:17 AM    ALKPHOS 61 2021 06:40 AM    ALKPHOS 60 2021 11:17 AM           ASSESSMENT & SUGGESTIONS  Patient Active  Problem List   Diagnosis Code     Discitis, unspecified spinal region M46.40     Prosthetic valve endocarditis (H) T82.6XXA, I38     Enterococcal sepsis (H) A41.81     Discitis of lumbar region M46.46      Clinically stable  Blood isolate S in vitro to ampicillin  No evidence new complication of discitis  or endocarditis    STILL with positive blood cultures  -- vanco Nov 4 to Nov 5, then stop   Now  -- ceftriaxone since Nov 5  -- ampicillin since Nov 6     ampicillin 2g q4h + ceftriaxone 2g q12h   Monitor spine & CV status             Angel Bettencourt MD  Covering for Sameer Ambrocio & Luis Carlos  Infectious Disease service    CURRENT MED REVIEWD  Current Facility-Administered Medications   Medication     acetaminophen (TYLENOL) tablet 1,000 mg     amLODIPine (NORVASC) tablet 2.5 mg     ampicillin (OMNIPEN) 2 g vial to attach to  mL bag     atorvastatin (LIPITOR) tablet 20 mg     cefTRIAXone (ROCEPHIN) 2 g vial to attach to  ml bag for ADULTS or NS 50 ml bag for PEDS     finasteride (PROSCAR) tablet 5 mg     gabapentin (NEURONTIN) capsule 100 mg     lidocaine (LMX4) cream     lidocaine 1 % 0.1-1 mL     losartan (COZAAR) tablet 100 mg     melatonin tablet 1 mg     metoprolol succinate ER (TOPROL-XL) 24 hr tablet 25 mg     ondansetron (ZOFRAN-ODT) ODT tab 4 mg    Or     ondansetron (ZOFRAN) injection 4 mg     Patient is already receiving anticoagulation with heparin, enoxaparin (LOVENOX), warfarin (COUMADIN)  or other anticoagulant medication     polyethylene glycol (MIRALAX) Packet 17 g     prochlorperazine (COMPAZINE) injection 5 mg    Or     prochlorperazine (COMPAZINE) tablet 5 mg    Or     prochlorperazine (COMPAZINE) suppository 12.5 mg     senna-docusate (SENOKOT-S/PERICOLACE) 8.6-50 MG per tablet 1 tablet    Or     senna-docusate (SENOKOT-S/PERICOLACE) 8.6-50 MG per tablet 2 tablet     sodium chloride (PF) 0.9% PF flush 3 mL     sodium chloride (PF) 0.9% PF flush 3 mL     tamsulosin (FLOMAX) capsule 0.4 mg      warfarin ANTICOAGULANT (COUMADIN) tablet 7.5 mg     Warfarin Therapy Reminder (Check START DATE - warfarin may be starting in the FUTURE)
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
## 2021-11-07 NOTE — PLAN OF CARE
Occupational Therapy Discharge Summary    Reason for therapy discharge:    Transferred to [facility]    Progress towards therapy goal(s). See goals on Care Plan in [facility] electronic health record for goal details.  Goals not met.  Barriers to achieving goals:   discharge from facility.    Therapy recommendation(s):    Continued therapy is recommended.  Rationale/Recommendations:  OT eval and treat when medically indicated at [facility].      **Pt not seen by discharging therapist on this date, note written based on previous treating therapist's notes and recommendations
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
## 2021-11-07 NOTE — PROGRESS NOTES
"COPY OF NOTE FROM NOV 6  Note Infectious Disease Consult Service Progress Note  Covering for Luis Carlos Green & Nga   Pt Name Celia Stallings   Date 11/06/2021   MRN [id number]      Notes / labs / imaging test results and other data were reviewed     CHIEF COMPLAINT: REASON FOR VISIT     Subtle cognitive changes, new back pain     HPI     75 yo s/p AVR and MVR   Now w Enterococcal sepsis / presumed prosthetic valve endocarditis     11/6/2021  INTERIM UPDATE     About same  No new pain  No new shortness of breath  No new joint troubles  No new headache  No trouble related to antibiotic that he can tell     Remainder of 14-point ROS was negative except as listed above     PFSH:  Personal / Family / Social Histories were reviewed and updated  nil new  Vital Signs:       BP (!) 140/52 (BP Location: Right arm)   Pulse 62   Temp 98  F (36.7  C) (Oral)   Resp 16   Ht 1.753 m (5' 9\")   Wt 93 kg (205 lb)   SpO2 97%   BMI 30.27 kg/m    EXAM     gen    In bed  - no acute distress       lungs    Non labored respirations      CV    Irregular, soft murmur, distinct heart tones      neuro    Nl conversation  Moves all limbs      abd    Soft, not tender      skin    No peripheral stigmata of infective endocarditis    psyche    calm, coherent, cooperative, appropriate            Data  Microbiology    ! = positive test result          Imaging  11/4        MRI  Abnormal endplate edema asymmetric towards the left at L2-L3. There  is also increased T2 signal within the disc at this level anteriorly  and towards the left. While these may all be degenerative in etiology,  early discitis is not excluded.     LABS        Lab Results   Component Value Date/Time     WBC 8.3 11/06/2021 06:07 AM     WBC 8.3 11/04/2021 11:17 AM     WBC 6.3 05/13/2013 10:54 PM     AST 30 11/04/2021 11:17 AM     ALT 42 11/04/2021 11:17 AM     ALKPHOS 60 11/04/2021 11:17 AM               ASSESSMENT & SUGGESTIONS  Patient Active Problem List   Diagnosis " Code     Discitis, unspecified spinal region M46.40     Prosthetic valve endocarditis (H) T82.6XXA, I38     Enterococcal sepsis (H) A41.81     Discitis of lumbar region M46.46      Clinically stable  Blood isolate S in vitro to ampicillin  No evidence new complication of discitis (distal neuro compromise) or endocarditis (worse heart failure, systemic emboli, immunologic consequences)     Change to ampicillin + q12h ceftriaxone (for enterococcal endocarditis)  Decisions re if/when valve surgery per CV surgery  Monitor spine               Angel Bettencourt MD  Covering for Sameer Ambrocio & Luis Carlos  Infectious Disease service

## 2021-11-07 NOTE — PLAN OF CARE
Neuro- A&OX4  Most Recent Vitals- Temp: 98.3  F (36.8  C) Temp src: Oral BP: (!) 140/59 Pulse: 55   Resp: 19 SpO2: 96 % O2 Device: None (Room air)   Tele/Cardiac- SB  Resp- RA, HOME C-PAP at HS   Activity- Bed rest, up with 2 gait belt and walker per report.   Pain- low back pain prn Tylenol given with good effect   Drips- intermittent ABX.   Drains/Tubes- 18G P-IV on left forearm   Skin- dry, intact dry scabs on knees  GI/- prn straight cath, Bladder scan showed 445, straight scathed for 450 at 2100.   Aggression Color- Green  COVID status- Negative  Plan- Cardiovascular surgery consult tomorrow.   Misc-     Greg Bustamante RN

## 2021-11-07 NOTE — PHARMACY-ANTICOAGULATION SERVICE
Clinical Pharmacy - Warfarin Dosing Consult     Pharmacy has been consulted to manage this patient s warfarin therapy.  Indication: Mechanical Mitral Valve Replacement  Therapy Goal: INR 2.5-3.5  Warfarin Prior to Admission: Yes  Warfarin PTA Regimen: 5 mg Daily  Significant drug interactions: New Antibiotics  (Ampicillin, Ceftriaxone)  Dose Comments: Supratherapeutic    INR   Date Value Ref Range Status   11/06/2021 4.71 (H) 0.85 - 1.15 Final   11/05/2021 5.91 (HH) 0.85 - 1.15 Final       Recommend No warfarin dose  today.  Pharmacy will monitor ro NARINDER Stallings daily and order warfarin doses to achieve specified goal.      Please contact pharmacy as soon as possible if the warfarin needs to be held for a procedure or if the warfarin goals change.        Jorge BentleyD

## 2021-11-07 NOTE — PLAN OF CARE
Pt A&Ox4, forgetful at times. Denies pain. VSS on Ra/home CPAP. PIV SL with intermittent abx. Cardiac diet. Tele SB with 1st degree AVB. Pt self caths at home, overnight BS of 450 and st cathed for 350. + Blood culture gram+ with cocci in pairs and chains, MD aware. ID and Cards following. Plan for CV surgery consult today. Continue to monitor.

## 2021-11-08 ENCOUNTER — APPOINTMENT (OUTPATIENT)
Dept: PHYSICAL THERAPY | Facility: CLINIC | Age: 76
DRG: 314 | End: 2021-11-08
Attending: STUDENT IN AN ORGANIZED HEALTH CARE EDUCATION/TRAINING PROGRAM
Payer: COMMERCIAL

## 2021-11-08 LAB
ANION GAP SERPL CALCULATED.3IONS-SCNC: 2 MMOL/L (ref 3–14)
BACTERIA UR CULT: NORMAL
BASOPHILS # BLD AUTO: 0 10E3/UL (ref 0–0.2)
BASOPHILS NFR BLD AUTO: 0 %
BUN SERPL-MCNC: 17 MG/DL (ref 7–30)
CALCIUM SERPL-MCNC: 9 MG/DL (ref 8.5–10.1)
CHLORIDE BLD-SCNC: 110 MMOL/L (ref 94–109)
CO2 SERPL-SCNC: 26 MMOL/L (ref 20–32)
CREAT SERPL-MCNC: 0.8 MG/DL (ref 0.66–1.25)
EOSINOPHIL # BLD AUTO: 0.2 10E3/UL (ref 0–0.7)
EOSINOPHIL NFR BLD AUTO: 3 %
ERYTHROCYTE [DISTWIDTH] IN BLOOD BY AUTOMATED COUNT: 12.9 % (ref 10–15)
GFR SERPL CREATININE-BSD FRML MDRD: 86 ML/MIN/1.73M2
GLUCOSE BLD-MCNC: 95 MG/DL (ref 70–99)
HCT VFR BLD AUTO: 33.6 % (ref 40–53)
HGB BLD-MCNC: 10.7 G/DL (ref 13.3–17.7)
IMM GRANULOCYTES # BLD: 0.1 10E3/UL
IMM GRANULOCYTES NFR BLD: 2 %
INR PPP: 3.21 (ref 0.85–1.15)
LYMPHOCYTES # BLD AUTO: 1.3 10E3/UL (ref 0.8–5.3)
LYMPHOCYTES NFR BLD AUTO: 18 %
MCH RBC QN AUTO: 28.7 PG (ref 26.5–33)
MCHC RBC AUTO-ENTMCNC: 31.8 G/DL (ref 31.5–36.5)
MCV RBC AUTO: 90 FL (ref 78–100)
MONOCYTES # BLD AUTO: 0.5 10E3/UL (ref 0–1.3)
MONOCYTES NFR BLD AUTO: 7 %
NEUTROPHILS # BLD AUTO: 5 10E3/UL (ref 1.6–8.3)
NEUTROPHILS NFR BLD AUTO: 70 %
NRBC # BLD AUTO: 0 10E3/UL
NRBC BLD AUTO-RTO: 0 /100
PLATELET # BLD AUTO: 329 10E3/UL (ref 150–450)
POTASSIUM BLD-SCNC: 4.3 MMOL/L (ref 3.4–5.3)
RBC # BLD AUTO: 3.73 10E6/UL (ref 4.4–5.9)
SODIUM SERPL-SCNC: 138 MMOL/L (ref 133–144)
WBC # BLD AUTO: 7.2 10E3/UL (ref 4–11)

## 2021-11-08 PROCEDURE — 36415 COLL VENOUS BLD VENIPUNCTURE: CPT | Performed by: INTERNAL MEDICINE

## 2021-11-08 PROCEDURE — 97530 THERAPEUTIC ACTIVITIES: CPT | Mod: GP

## 2021-11-08 PROCEDURE — 87040 BLOOD CULTURE FOR BACTERIA: CPT | Performed by: INTERNAL MEDICINE

## 2021-11-08 PROCEDURE — 250N000013 HC RX MED GY IP 250 OP 250 PS 637: Performed by: STUDENT IN AN ORGANIZED HEALTH CARE EDUCATION/TRAINING PROGRAM

## 2021-11-08 PROCEDURE — 99232 SBSQ HOSP IP/OBS MODERATE 35: CPT | Performed by: INTERNAL MEDICINE

## 2021-11-08 PROCEDURE — 250N000013 HC RX MED GY IP 250 OP 250 PS 637: Performed by: INTERNAL MEDICINE

## 2021-11-08 PROCEDURE — 210N000002 HC R&B HEART CARE

## 2021-11-08 PROCEDURE — 250N000011 HC RX IP 250 OP 636: Performed by: STUDENT IN AN ORGANIZED HEALTH CARE EDUCATION/TRAINING PROGRAM

## 2021-11-08 PROCEDURE — 97161 PT EVAL LOW COMPLEX 20 MIN: CPT | Mod: GP

## 2021-11-08 PROCEDURE — 250N000013 HC RX MED GY IP 250 OP 250 PS 637: Performed by: HOSPITALIST

## 2021-11-08 PROCEDURE — 85025 COMPLETE CBC W/AUTO DIFF WBC: CPT | Performed by: INTERNAL MEDICINE

## 2021-11-08 PROCEDURE — 85610 PROTHROMBIN TIME: CPT | Performed by: STUDENT IN AN ORGANIZED HEALTH CARE EDUCATION/TRAINING PROGRAM

## 2021-11-08 PROCEDURE — 82310 ASSAY OF CALCIUM: CPT | Performed by: INTERNAL MEDICINE

## 2021-11-08 RX ORDER — WARFARIN SODIUM 4 MG/1
4 TABLET ORAL
Status: COMPLETED | OUTPATIENT
Start: 2021-11-08 | End: 2021-11-08

## 2021-11-08 RX ADMIN — DICLOFENAC SODIUM 4 G: 10 GEL TOPICAL at 06:49

## 2021-11-08 RX ADMIN — ACETAMINOPHEN 1000 MG: 500 TABLET, FILM COATED ORAL at 20:05

## 2021-11-08 RX ADMIN — LOSARTAN POTASSIUM 100 MG: 100 TABLET, FILM COATED ORAL at 08:30

## 2021-11-08 RX ADMIN — CYCLOBENZAPRINE 10 MG: 5 TABLET, FILM COATED ORAL at 06:48

## 2021-11-08 RX ADMIN — DICLOFENAC SODIUM 4 G: 10 GEL TOPICAL at 21:33

## 2021-11-08 RX ADMIN — AMPICILLIN SODIUM 2 G: 2 INJECTION, POWDER, FOR SOLUTION INTRAVENOUS at 12:22

## 2021-11-08 RX ADMIN — OXYCODONE HYDROCHLORIDE 5 MG: 5 TABLET ORAL at 08:35

## 2021-11-08 RX ADMIN — ACETAMINOPHEN 1000 MG: 500 TABLET, FILM COATED ORAL at 08:29

## 2021-11-08 RX ADMIN — AMPICILLIN SODIUM 2 G: 2 INJECTION, POWDER, FOR SOLUTION INTRAVENOUS at 04:36

## 2021-11-08 RX ADMIN — WARFARIN SODIUM 4 MG: 4 TABLET ORAL at 17:27

## 2021-11-08 RX ADMIN — AMPICILLIN SODIUM 2 G: 2 INJECTION, POWDER, FOR SOLUTION INTRAVENOUS at 21:33

## 2021-11-08 RX ADMIN — DICLOFENAC SODIUM 4 G: 10 GEL TOPICAL at 12:24

## 2021-11-08 RX ADMIN — AMPICILLIN SODIUM 2 G: 2 INJECTION, POWDER, FOR SOLUTION INTRAVENOUS at 17:43

## 2021-11-08 RX ADMIN — GABAPENTIN 100 MG: 100 CAPSULE ORAL at 08:29

## 2021-11-08 RX ADMIN — AMLODIPINE BESYLATE 2.5 MG: 2.5 TABLET ORAL at 08:30

## 2021-11-08 RX ADMIN — HYDROMORPHONE HYDROCHLORIDE 1 MG: 2 TABLET ORAL at 20:05

## 2021-11-08 RX ADMIN — AMPICILLIN SODIUM 2 G: 2 INJECTION, POWDER, FOR SOLUTION INTRAVENOUS at 08:30

## 2021-11-08 RX ADMIN — AMPICILLIN SODIUM 2 G: 2 INJECTION, POWDER, FOR SOLUTION INTRAVENOUS at 01:26

## 2021-11-08 RX ADMIN — ACETAMINOPHEN 1000 MG: 500 TABLET, FILM COATED ORAL at 12:21

## 2021-11-08 RX ADMIN — SENNOSIDES AND DOCUSATE SODIUM 1 TABLET: 8.6; 5 TABLET ORAL at 18:22

## 2021-11-08 RX ADMIN — CYCLOBENZAPRINE 10 MG: 5 TABLET, FILM COATED ORAL at 17:25

## 2021-11-08 RX ADMIN — ATORVASTATIN CALCIUM 20 MG: 20 TABLET, FILM COATED ORAL at 08:30

## 2021-11-08 RX ADMIN — HYDROMORPHONE HYDROCHLORIDE 1 MG: 2 TABLET ORAL at 12:22

## 2021-11-08 RX ADMIN — TAMSULOSIN HYDROCHLORIDE 0.4 MG: 0.4 CAPSULE ORAL at 08:29

## 2021-11-08 RX ADMIN — FINASTERIDE 5 MG: 5 TABLET, FILM COATED ORAL at 08:29

## 2021-11-08 RX ADMIN — DICLOFENAC SODIUM 4 G: 10 GEL TOPICAL at 18:21

## 2021-11-08 RX ADMIN — CEFTRIAXONE SODIUM 2 G: 2 INJECTION, POWDER, FOR SOLUTION INTRAMUSCULAR; INTRAVENOUS at 20:05

## 2021-11-08 RX ADMIN — METOPROLOL SUCCINATE 25 MG: 25 TABLET, EXTENDED RELEASE ORAL at 08:30

## 2021-11-08 RX ADMIN — CEFTRIAXONE SODIUM 2 G: 2 INJECTION, POWDER, FOR SOLUTION INTRAMUSCULAR; INTRAVENOUS at 08:30

## 2021-11-08 ASSESSMENT — ACTIVITIES OF DAILY LIVING (ADL)
ADLS_ACUITY_SCORE: 9
ADLS_ACUITY_SCORE: 10
ADLS_ACUITY_SCORE: 9
ADLS_ACUITY_SCORE: 9
ADLS_ACUITY_SCORE: 10
ADLS_ACUITY_SCORE: 9
ADLS_ACUITY_SCORE: 10
ADLS_ACUITY_SCORE: 9
ADLS_ACUITY_SCORE: 10
ADLS_ACUITY_SCORE: 10
ADLS_ACUITY_SCORE: 9
ADLS_ACUITY_SCORE: 9
ADLS_ACUITY_SCORE: 10
ADLS_ACUITY_SCORE: 9
ADLS_ACUITY_SCORE: 10
ADLS_ACUITY_SCORE: 10

## 2021-11-08 NOTE — PROGRESS NOTES
Pt is alert and oriented x 4. VSS on RA, back pain managed with scheduled Tylenol, PRN oxycodone, PRN Flexeril and Voltren gel. Continue to receive IV Ampicillin and Rocephin. ID is following. Tele Junctional rhythm. Possible PICC placement for long term IV abx. Continue to monitor.
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
## 2021-11-08 NOTE — PROGRESS NOTES
Brief Hospitalist Cross cover note:    Temp: 97.9  F (36.6  C) Temp src: Oral BP: (!) 155/75 Pulse: 57   Resp: 21 SpO2: 94 % O2 Device: None (Room air)       Paged due to pain, not tolerating oxycodone. Ordered voltaren gel and prn flexeril per RN request.        Sandra Mitchell MD  7:55 PM

## 2021-11-08 NOTE — PLAN OF CARE
No chest pain  or sob. Much pain with slightest movement. Shimon sitting on edge of bed  for a few seconds. Needs much encouragement to do any independent  activities.Tires easily

## 2021-11-08 NOTE — PROGRESS NOTES
[facility]    Received request for benefit check for potential home IV abx. [facility] is out of network with [facility]'s [employer] Medicare plan.[facility] has reached out to Shaun and Geovanna for benefits/quotes.    Thank you    Haley Nicholas RN  [facility] Liaison  [phone] (Mon thru Fri 8am - 5pm)  [phone] Office
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
## 2021-11-08 NOTE — PROGRESS NOTES
MD Notification    Notified Person: MD    Notified Person Name:  Dr Mitchell    Notification Date/Time: 11/7 at 1947    Notification Interaction:    Purpose of Notification: Pt is c/o back pain despite oxycodone administration.  Wondering Voltaren gel and/or Flexeril? Please advice?     Orders Received: Voltaren gel and flexeril order in placed.     Comments:
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
## 2021-11-09 ENCOUNTER — APPOINTMENT (OUTPATIENT)
Dept: PHYSICAL THERAPY | Facility: CLINIC | Age: 76
DRG: 314 | End: 2021-11-09
Attending: HOSPITALIST
Payer: COMMERCIAL

## 2021-11-09 LAB
ANION GAP SERPL CALCULATED.3IONS-SCNC: 3 MMOL/L (ref 3–14)
ATRIAL RATE - MUSE: 59 BPM
BACTERIA BLD CULT: ABNORMAL
BASOPHILS # BLD AUTO: 0 10E3/UL (ref 0–0.2)
BASOPHILS NFR BLD AUTO: 1 %
BUN SERPL-MCNC: 19 MG/DL (ref 7–30)
CALCIUM SERPL-MCNC: 9.3 MG/DL (ref 8.5–10.1)
CHLORIDE BLD-SCNC: 110 MMOL/L (ref 94–109)
CO2 SERPL-SCNC: 27 MMOL/L (ref 20–32)
CREAT SERPL-MCNC: 0.83 MG/DL (ref 0.66–1.25)
DIASTOLIC BLOOD PRESSURE - MUSE: NORMAL MMHG
EOSINOPHIL # BLD AUTO: 0.2 10E3/UL (ref 0–0.7)
EOSINOPHIL NFR BLD AUTO: 3 %
ERYTHROCYTE [DISTWIDTH] IN BLOOD BY AUTOMATED COUNT: 13.1 % (ref 10–15)
GFR SERPL CREATININE-BSD FRML MDRD: 84 ML/MIN/1.73M2
GLUCOSE BLD-MCNC: 99 MG/DL (ref 70–99)
HCT VFR BLD AUTO: 35.2 % (ref 40–53)
HGB BLD-MCNC: 11.4 G/DL (ref 13.3–17.7)
IMM GRANULOCYTES # BLD: 0.1 10E3/UL
IMM GRANULOCYTES NFR BLD: 2 %
INR PPP: 3.25 (ref 0.85–1.15)
INTERPRETATION ECG - MUSE: NORMAL
LYMPHOCYTES # BLD AUTO: 1.2 10E3/UL (ref 0.8–5.3)
LYMPHOCYTES NFR BLD AUTO: 17 %
MCH RBC QN AUTO: 29.5 PG (ref 26.5–33)
MCHC RBC AUTO-ENTMCNC: 32.4 G/DL (ref 31.5–36.5)
MCV RBC AUTO: 91 FL (ref 78–100)
MONOCYTES # BLD AUTO: 0.4 10E3/UL (ref 0–1.3)
MONOCYTES NFR BLD AUTO: 6 %
NEUTROPHILS # BLD AUTO: 5 10E3/UL (ref 1.6–8.3)
NEUTROPHILS NFR BLD AUTO: 71 %
NRBC # BLD AUTO: 0 10E3/UL
NRBC BLD AUTO-RTO: 0 /100
P AXIS - MUSE: NORMAL DEGREES
PLATELET # BLD AUTO: 320 10E3/UL (ref 150–450)
POTASSIUM BLD-SCNC: 4.4 MMOL/L (ref 3.4–5.3)
PR INTERVAL - MUSE: NORMAL MS
QRS DURATION - MUSE: 102 MS
QT - MUSE: 452 MS
QTC - MUSE: 443 MS
R AXIS - MUSE: 32 DEGREES
RBC # BLD AUTO: 3.86 10E6/UL (ref 4.4–5.9)
SODIUM SERPL-SCNC: 140 MMOL/L (ref 133–144)
SYSTOLIC BLOOD PRESSURE - MUSE: NORMAL MMHG
T AXIS - MUSE: 25 DEGREES
VENTRICULAR RATE- MUSE: 58 BPM
WBC # BLD AUTO: 7 10E3/UL (ref 4–11)

## 2021-11-09 PROCEDURE — 36415 COLL VENOUS BLD VENIPUNCTURE: CPT | Performed by: INTERNAL MEDICINE

## 2021-11-09 PROCEDURE — 99232 SBSQ HOSP IP/OBS MODERATE 35: CPT | Performed by: INTERNAL MEDICINE

## 2021-11-09 PROCEDURE — 85025 COMPLETE CBC W/AUTO DIFF WBC: CPT | Performed by: INTERNAL MEDICINE

## 2021-11-09 PROCEDURE — 82310 ASSAY OF CALCIUM: CPT | Performed by: INTERNAL MEDICINE

## 2021-11-09 PROCEDURE — 210N000002 HC R&B HEART CARE

## 2021-11-09 PROCEDURE — 85610 PROTHROMBIN TIME: CPT | Performed by: STUDENT IN AN ORGANIZED HEALTH CARE EDUCATION/TRAINING PROGRAM

## 2021-11-09 PROCEDURE — 250N000011 HC RX IP 250 OP 636: Performed by: STUDENT IN AN ORGANIZED HEALTH CARE EDUCATION/TRAINING PROGRAM

## 2021-11-09 PROCEDURE — 87040 BLOOD CULTURE FOR BACTERIA: CPT | Performed by: INTERNAL MEDICINE

## 2021-11-09 PROCEDURE — 250N000013 HC RX MED GY IP 250 OP 250 PS 637: Performed by: HOSPITALIST

## 2021-11-09 PROCEDURE — 97530 THERAPEUTIC ACTIVITIES: CPT | Mod: GP | Performed by: PHYSICAL THERAPIST

## 2021-11-09 PROCEDURE — 250N000013 HC RX MED GY IP 250 OP 250 PS 637: Performed by: STUDENT IN AN ORGANIZED HEALTH CARE EDUCATION/TRAINING PROGRAM

## 2021-11-09 PROCEDURE — 97116 GAIT TRAINING THERAPY: CPT | Mod: GP | Performed by: PHYSICAL THERAPIST

## 2021-11-09 PROCEDURE — 250N000013 HC RX MED GY IP 250 OP 250 PS 637: Performed by: INTERNAL MEDICINE

## 2021-11-09 RX ORDER — HYDROMORPHONE HYDROCHLORIDE 2 MG/1
2-4 TABLET ORAL
Status: DISCONTINUED | OUTPATIENT
Start: 2021-11-09 | End: 2021-11-12 | Stop reason: HOSPADM

## 2021-11-09 RX ORDER — CYCLOBENZAPRINE HCL 5 MG
5 TABLET ORAL EVERY 6 HOURS PRN
Status: DISCONTINUED | OUTPATIENT
Start: 2021-11-09 | End: 2021-11-12 | Stop reason: HOSPADM

## 2021-11-09 RX ORDER — WARFARIN SODIUM 4 MG/1
4 TABLET ORAL
Status: COMPLETED | OUTPATIENT
Start: 2021-11-09 | End: 2021-11-09

## 2021-11-09 RX ORDER — GABAPENTIN 100 MG/1
100 CAPSULE ORAL 3 TIMES DAILY
Status: DISCONTINUED | OUTPATIENT
Start: 2021-11-09 | End: 2021-11-12 | Stop reason: HOSPADM

## 2021-11-09 RX ADMIN — CEFTRIAXONE SODIUM 2 G: 2 INJECTION, POWDER, FOR SOLUTION INTRAMUSCULAR; INTRAVENOUS at 20:45

## 2021-11-09 RX ADMIN — CEFTRIAXONE SODIUM 2 G: 2 INJECTION, POWDER, FOR SOLUTION INTRAMUSCULAR; INTRAVENOUS at 09:45

## 2021-11-09 RX ADMIN — HYDROMORPHONE HYDROCHLORIDE 2 MG: 2 TABLET ORAL at 09:36

## 2021-11-09 RX ADMIN — SENNOSIDES AND DOCUSATE SODIUM 1 TABLET: 8.6; 5 TABLET ORAL at 22:07

## 2021-11-09 RX ADMIN — ACETAMINOPHEN 1000 MG: 500 TABLET, FILM COATED ORAL at 20:45

## 2021-11-09 RX ADMIN — DICLOFENAC SODIUM 4 G: 10 GEL TOPICAL at 22:07

## 2021-11-09 RX ADMIN — ACETAMINOPHEN 1000 MG: 500 TABLET, FILM COATED ORAL at 05:23

## 2021-11-09 RX ADMIN — AMPICILLIN SODIUM 2 G: 2 INJECTION, POWDER, FOR SOLUTION INTRAVENOUS at 05:20

## 2021-11-09 RX ADMIN — LOSARTAN POTASSIUM 100 MG: 100 TABLET, FILM COATED ORAL at 09:39

## 2021-11-09 RX ADMIN — AMPICILLIN SODIUM 2 G: 2 INJECTION, POWDER, FOR SOLUTION INTRAVENOUS at 01:00

## 2021-11-09 RX ADMIN — FINASTERIDE 5 MG: 5 TABLET, FILM COATED ORAL at 09:38

## 2021-11-09 RX ADMIN — CYCLOBENZAPRINE 10 MG: 5 TABLET, FILM COATED ORAL at 01:00

## 2021-11-09 RX ADMIN — HYDROMORPHONE HYDROCHLORIDE 4 MG: 2 TABLET ORAL at 22:21

## 2021-11-09 RX ADMIN — ACETAMINOPHEN 1000 MG: 500 TABLET, FILM COATED ORAL at 12:19

## 2021-11-09 RX ADMIN — AMPICILLIN SODIUM 2 G: 2 INJECTION, POWDER, FOR SOLUTION INTRAVENOUS at 12:21

## 2021-11-09 RX ADMIN — AMPICILLIN SODIUM 2 G: 2 INJECTION, POWDER, FOR SOLUTION INTRAVENOUS at 16:34

## 2021-11-09 RX ADMIN — ATORVASTATIN CALCIUM 20 MG: 20 TABLET, FILM COATED ORAL at 09:38

## 2021-11-09 RX ADMIN — METOPROLOL SUCCINATE 25 MG: 25 TABLET, EXTENDED RELEASE ORAL at 09:40

## 2021-11-09 RX ADMIN — AMPICILLIN SODIUM 2 G: 2 INJECTION, POWDER, FOR SOLUTION INTRAVENOUS at 19:32

## 2021-11-09 RX ADMIN — Medication 1 MG: at 01:00

## 2021-11-09 RX ADMIN — HYDROMORPHONE HYDROCHLORIDE 1 MG: 2 TABLET ORAL at 04:33

## 2021-11-09 RX ADMIN — GABAPENTIN 100 MG: 100 CAPSULE ORAL at 16:34

## 2021-11-09 RX ADMIN — DICLOFENAC SODIUM 4 G: 10 GEL TOPICAL at 16:34

## 2021-11-09 RX ADMIN — GABAPENTIN 100 MG: 100 CAPSULE ORAL at 20:45

## 2021-11-09 RX ADMIN — TAMSULOSIN HYDROCHLORIDE 0.4 MG: 0.4 CAPSULE ORAL at 09:40

## 2021-11-09 RX ADMIN — AMLODIPINE BESYLATE 2.5 MG: 2.5 TABLET ORAL at 09:37

## 2021-11-09 RX ADMIN — WARFARIN SODIUM 4 MG: 4 TABLET ORAL at 19:31

## 2021-11-09 RX ADMIN — CYCLOBENZAPRINE 10 MG: 5 TABLET, FILM COATED ORAL at 09:36

## 2021-11-09 RX ADMIN — DICLOFENAC SODIUM 4 G: 10 GEL TOPICAL at 13:06

## 2021-11-09 RX ADMIN — CYCLOBENZAPRINE 5 MG: 5 TABLET, FILM COATED ORAL at 22:07

## 2021-11-09 RX ADMIN — GABAPENTIN 100 MG: 100 CAPSULE ORAL at 09:39

## 2021-11-09 RX ADMIN — HYDROMORPHONE HYDROCHLORIDE 4 MG: 2 TABLET ORAL at 13:04

## 2021-11-09 ASSESSMENT — ACTIVITIES OF DAILY LIVING (ADL)
ADLS_ACUITY_SCORE: 10
ADLS_ACUITY_SCORE: 8
ADLS_ACUITY_SCORE: 10
ADLS_ACUITY_SCORE: 8
ADLS_ACUITY_SCORE: 10
ADLS_ACUITY_SCORE: 8
ADLS_ACUITY_SCORE: 10
ADLS_ACUITY_SCORE: 8
ADLS_ACUITY_SCORE: 10
ADLS_ACUITY_SCORE: 10

## 2021-11-09 NOTE — PLAN OF CARE
VSS, SR, room air with CPAP at night. Not out of bed this shift- needs encouragement and PT to increase activity level. Turns side to back independently. No skin breakdown.  Pain meds effective-still difficulty falling asleep. Melatonin given with some help. Constipated- needs bowel meds again. IV abx for valve veggies. Care coordinator following for outpatient infusion coverage.

## 2021-11-09 NOTE — PROGRESS NOTES
CV Surgery    Discussed with Dr. Brewer. Continue IV abx for 6 weeks with follow up echo. Surgery will sign off for now, call with questions.    Urvashi Coleman PA-C

## 2021-11-09 NOTE — PROGRESS NOTES
[facility]     Received request for benefit check for potential home IV abx. [facility] is out of network with Celia's [employer] Medicare plan.[facility] has reached out to Shaun and [employer] for benefits/quotes.    [place]: Pt's [employer] Medicare plan has a deductible of $4500 (met $590) to date. Once his ded is met (most likely with this current hospitalization) his supplies will be covered at 100%. His IV amp and ceftriaxone will be billed under his Medicare part D plan and he will have a co-pay. Cost for Ampicillin 12g daily continuous infusion via CADD pump + ceftriaxone 2g q12 = $47.42 / day or $332 / week for drug only. Nursing will be covered at 100% as long as pt is homebound.       [employer]: Patient does not have full IV coverage with [employer] Medicare plan. Ampicillin 12g daily continuous infusion via CADD pump + ceftriaxone 2g q12 = $77.49 / day or $542.43 / week for drug and supplies. Nursing will be covered at 100% as long as pt is homebound.      Thank you     Haley Nicholas RN  [facility] Liaison  [phone] (Mon thru Fri 8am - 5pm)  [phone] Office
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
## 2021-11-10 ENCOUNTER — APPOINTMENT (OUTPATIENT)
Dept: PHYSICAL THERAPY | Facility: CLINIC | Age: 76
DRG: 314 | End: 2021-11-10
Attending: HOSPITALIST
Payer: COMMERCIAL

## 2021-11-10 PROBLEM — I10 BENIGN ESSENTIAL HYPERTENSION: Status: ACTIVE | Noted: 2021-11-10

## 2021-11-10 PROBLEM — N40.0 BPH (BENIGN PROSTATIC HYPERPLASIA): Status: ACTIVE | Noted: 2021-11-10

## 2021-11-10 PROBLEM — D68.32 WARFARIN-INDUCED COAGULOPATHY (H): Status: ACTIVE | Noted: 2021-11-10
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
## 2021-11-10 PROBLEM — T82.6XXA PROSTHETIC VALVE ENDOCARDITIS (H): Status: RESOLVED | Noted: 2021-11-06 | Resolved: 2021-11-10

## 2021-11-10 PROBLEM — I33.0 MITRAL VALVE VEGETATION: Status: ACTIVE | Noted: 2021-11-10

## 2021-11-10 PROBLEM — A41.81 ENTEROCOCCAL SEPSIS (H): Status: RESOLVED | Noted: 2021-11-06 | Resolved: 2021-11-10

## 2021-11-10 PROBLEM — Z87.74 HX OF BICUSPID AORTIC VALVE: Status: ACTIVE | Noted: 2021-11-10

## 2021-11-10 PROBLEM — G47.33 OSA ON CPAP: Status: ACTIVE | Noted: 2021-11-10

## 2021-11-10 PROBLEM — M46.40 DISCITIS, UNSPECIFIED SPINAL REGION: Status: RESOLVED | Noted: 2021-11-04 | Resolved: 2021-11-10

## 2021-11-10 PROBLEM — I38 PROSTHETIC VALVE ENDOCARDITIS (H): Status: RESOLVED | Noted: 2021-11-06 | Resolved: 2021-11-10

## 2021-11-10 PROBLEM — Z95.2 H/O MITRAL VALVE REPLACEMENT WITH MECHANICAL VALVE: Status: ACTIVE | Noted: 2021-11-10

## 2021-11-10 PROBLEM — T45.515A WARFARIN-INDUCED COAGULOPATHY (H): Status: ACTIVE | Noted: 2021-11-10

## 2021-11-10 LAB
ANION GAP SERPL CALCULATED.3IONS-SCNC: 3 MMOL/L (ref 3–14)
ATRIAL RATE - MUSE: 55 BPM
BACTERIA BLD CULT: ABNORMAL
BACTERIA BLD CULT: ABNORMAL
BUN SERPL-MCNC: 17 MG/DL (ref 7–30)
CALCIUM SERPL-MCNC: 9.2 MG/DL (ref 8.5–10.1)
CHLORIDE BLD-SCNC: 109 MMOL/L (ref 94–109)
CO2 SERPL-SCNC: 26 MMOL/L (ref 20–32)
CREAT SERPL-MCNC: 0.75 MG/DL (ref 0.66–1.25)
DIASTOLIC BLOOD PRESSURE - MUSE: NORMAL MMHG
ERYTHROCYTE [DISTWIDTH] IN BLOOD BY AUTOMATED COUNT: 13.1 % (ref 10–15)
GFR SERPL CREATININE-BSD FRML MDRD: 88 ML/MIN/1.73M2
GLUCOSE BLD-MCNC: 100 MG/DL (ref 70–99)
HCT VFR BLD AUTO: 34.4 % (ref 40–53)
HGB BLD-MCNC: 11.1 G/DL (ref 13.3–17.7)
INR PPP: 3.27 (ref 0.85–1.15)
INTERPRETATION ECG - MUSE: NORMAL
MCH RBC QN AUTO: 29.2 PG (ref 26.5–33)
MCHC RBC AUTO-ENTMCNC: 32.3 G/DL (ref 31.5–36.5)
MCV RBC AUTO: 91 FL (ref 78–100)
P AXIS - MUSE: NORMAL DEGREES
PLATELET # BLD AUTO: 317 10E3/UL (ref 150–450)
POTASSIUM BLD-SCNC: 4.4 MMOL/L (ref 3.4–5.3)
PR INTERVAL - MUSE: NORMAL MS
QRS DURATION - MUSE: 100 MS
QT - MUSE: 460 MS
QTC - MUSE: 440 MS
R AXIS - MUSE: 23 DEGREES
RBC # BLD AUTO: 3.8 10E6/UL (ref 4.4–5.9)
SODIUM SERPL-SCNC: 138 MMOL/L (ref 133–144)
SYSTOLIC BLOOD PRESSURE - MUSE: NORMAL MMHG
T AXIS - MUSE: 25 DEGREES
VENTRICULAR RATE- MUSE: 55 BPM
WBC # BLD AUTO: 7.4 10E3/UL (ref 4–11)

## 2021-11-10 PROCEDURE — 250N000011 HC RX IP 250 OP 636: Performed by: STUDENT IN AN ORGANIZED HEALTH CARE EDUCATION/TRAINING PROGRAM

## 2021-11-10 PROCEDURE — 210N000002 HC R&B HEART CARE

## 2021-11-10 PROCEDURE — 82374 ASSAY BLOOD CARBON DIOXIDE: CPT | Performed by: INTERNAL MEDICINE

## 2021-11-10 PROCEDURE — 250N000013 HC RX MED GY IP 250 OP 250 PS 637: Performed by: STUDENT IN AN ORGANIZED HEALTH CARE EDUCATION/TRAINING PROGRAM

## 2021-11-10 PROCEDURE — 87040 BLOOD CULTURE FOR BACTERIA: CPT | Performed by: INTERNAL MEDICINE

## 2021-11-10 PROCEDURE — 250N000013 HC RX MED GY IP 250 OP 250 PS 637: Performed by: INTERNAL MEDICINE

## 2021-11-10 PROCEDURE — 250N000009 HC RX 250: Performed by: INTERNAL MEDICINE

## 2021-11-10 PROCEDURE — 97530 THERAPEUTIC ACTIVITIES: CPT | Mod: GP | Performed by: PHYSICAL THERAPIST

## 2021-11-10 PROCEDURE — 272N000451 HC KIT SHRLOCK 5FR POWER PICC DOUBLE LUMEN

## 2021-11-10 PROCEDURE — 85610 PROTHROMBIN TIME: CPT | Performed by: STUDENT IN AN ORGANIZED HEALTH CARE EDUCATION/TRAINING PROGRAM

## 2021-11-10 PROCEDURE — 82310 ASSAY OF CALCIUM: CPT | Performed by: INTERNAL MEDICINE

## 2021-11-10 PROCEDURE — 99233 SBSQ HOSP IP/OBS HIGH 50: CPT | Performed by: INTERNAL MEDICINE

## 2021-11-10 PROCEDURE — 97116 GAIT TRAINING THERAPY: CPT | Mod: GP | Performed by: PHYSICAL THERAPIST

## 2021-11-10 PROCEDURE — 36569 INSJ PICC 5 YR+ W/O IMAGING: CPT

## 2021-11-10 PROCEDURE — 36415 COLL VENOUS BLD VENIPUNCTURE: CPT | Performed by: INTERNAL MEDICINE

## 2021-11-10 PROCEDURE — 85027 COMPLETE CBC AUTOMATED: CPT | Performed by: INTERNAL MEDICINE

## 2021-11-10 RX ORDER — WARFARIN SODIUM 4 MG/1
4 TABLET ORAL
Status: COMPLETED | OUTPATIENT
Start: 2021-11-10 | End: 2021-11-10

## 2021-11-10 RX ORDER — AMPICILLIN 2 G/1
12 INJECTION, POWDER, FOR SOLUTION INTRAVENOUS DAILY
DISCHARGE
Start: 2021-11-10 | End: 2021-11-12

## 2021-11-10 RX ORDER — CEFTRIAXONE 1 G/1
2000 INJECTION, POWDER, FOR SOLUTION INTRAMUSCULAR; INTRAVENOUS EVERY 12 HOURS
Qty: 600 ML | Refills: 0 | Status: SHIPPED | OUTPATIENT
Start: 2021-11-10 | End: 2021-12-14

## 2021-11-10 RX ADMIN — DICLOFENAC SODIUM 4 G: 10 GEL TOPICAL at 09:59

## 2021-11-10 RX ADMIN — CEFTRIAXONE SODIUM 2 G: 2 INJECTION, POWDER, FOR SOLUTION INTRAMUSCULAR; INTRAVENOUS at 20:23

## 2021-11-10 RX ADMIN — ACETAMINOPHEN 1000 MG: 500 TABLET, FILM COATED ORAL at 12:31

## 2021-11-10 RX ADMIN — LIDOCAINE HYDROCHLORIDE ANHYDROUS 1 ML: 10 INJECTION, SOLUTION INFILTRATION at 11:11

## 2021-11-10 RX ADMIN — CEFTRIAXONE SODIUM 2 G: 2 INJECTION, POWDER, FOR SOLUTION INTRAMUSCULAR; INTRAVENOUS at 09:21

## 2021-11-10 RX ADMIN — HYDROMORPHONE HYDROCHLORIDE 4 MG: 2 TABLET ORAL at 08:25

## 2021-11-10 RX ADMIN — WARFARIN SODIUM 4 MG: 4 TABLET ORAL at 17:58

## 2021-11-10 RX ADMIN — GABAPENTIN 100 MG: 100 CAPSULE ORAL at 08:25

## 2021-11-10 RX ADMIN — AMPICILLIN SODIUM 2 G: 2 INJECTION, POWDER, FOR SOLUTION INTRAVENOUS at 15:43

## 2021-11-10 RX ADMIN — AMPICILLIN SODIUM 2 G: 2 INJECTION, POWDER, FOR SOLUTION INTRAVENOUS at 08:25

## 2021-11-10 RX ADMIN — DICLOFENAC SODIUM 4 G: 10 GEL TOPICAL at 20:17

## 2021-11-10 RX ADMIN — HYDROMORPHONE HYDROCHLORIDE 4 MG: 2 TABLET ORAL at 03:43

## 2021-11-10 RX ADMIN — ACETAMINOPHEN 1000 MG: 500 TABLET, FILM COATED ORAL at 08:25

## 2021-11-10 RX ADMIN — LOSARTAN POTASSIUM 100 MG: 100 TABLET, FILM COATED ORAL at 08:25

## 2021-11-10 RX ADMIN — CYCLOBENZAPRINE 5 MG: 5 TABLET, FILM COATED ORAL at 22:41

## 2021-11-10 RX ADMIN — HYDROMORPHONE HYDROCHLORIDE 4 MG: 2 TABLET ORAL at 15:44

## 2021-11-10 RX ADMIN — AMLODIPINE BESYLATE 2.5 MG: 2.5 TABLET ORAL at 08:25

## 2021-11-10 RX ADMIN — AMPICILLIN SODIUM 2 G: 2 INJECTION, POWDER, FOR SOLUTION INTRAVENOUS at 12:45

## 2021-11-10 RX ADMIN — HYDROMORPHONE HYDROCHLORIDE 4 MG: 2 TABLET ORAL at 12:31

## 2021-11-10 RX ADMIN — AMPICILLIN SODIUM 2 G: 2 INJECTION, POWDER, FOR SOLUTION INTRAVENOUS at 03:44

## 2021-11-10 RX ADMIN — GABAPENTIN 100 MG: 100 CAPSULE ORAL at 15:52

## 2021-11-10 RX ADMIN — AMPICILLIN SODIUM 2 G: 2 INJECTION, POWDER, FOR SOLUTION INTRAVENOUS at 20:23

## 2021-11-10 RX ADMIN — ACETAMINOPHEN 1000 MG: 500 TABLET, FILM COATED ORAL at 20:17

## 2021-11-10 RX ADMIN — TAMSULOSIN HYDROCHLORIDE 0.4 MG: 0.4 CAPSULE ORAL at 08:25

## 2021-11-10 RX ADMIN — METOPROLOL SUCCINATE 25 MG: 25 TABLET, EXTENDED RELEASE ORAL at 08:25

## 2021-11-10 RX ADMIN — DICLOFENAC SODIUM 4 G: 10 GEL TOPICAL at 15:47

## 2021-11-10 RX ADMIN — HYDROMORPHONE HYDROCHLORIDE 4 MG: 2 TABLET ORAL at 20:17

## 2021-11-10 RX ADMIN — FINASTERIDE 5 MG: 5 TABLET, FILM COATED ORAL at 08:25

## 2021-11-10 RX ADMIN — AMPICILLIN SODIUM 2 G: 2 INJECTION, POWDER, FOR SOLUTION INTRAVENOUS at 00:19

## 2021-11-10 RX ADMIN — SENNOSIDES AND DOCUSATE SODIUM 2 TABLET: 8.6; 5 TABLET ORAL at 18:16

## 2021-11-10 RX ADMIN — ATORVASTATIN CALCIUM 20 MG: 20 TABLET, FILM COATED ORAL at 08:25

## 2021-11-10 ASSESSMENT — ACTIVITIES OF DAILY LIVING (ADL)
ADLS_ACUITY_SCORE: 10
ADLS_ACUITY_SCORE: 10
ADLS_ACUITY_SCORE: 13
ADLS_ACUITY_SCORE: 10
ADLS_ACUITY_SCORE: 12
ADLS_ACUITY_SCORE: 10
ADLS_ACUITY_SCORE: 13
ADLS_ACUITY_SCORE: 10
ADLS_ACUITY_SCORE: 12
ADLS_ACUITY_SCORE: 10
ADLS_ACUITY_SCORE: 10
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
## 2021-11-10 NOTE — PLAN OF CARE
VSS, RA while awake, CPAP with sleep. Requires pain medication prior to getting up out of bed. Plan for 6 weeks IV abx, needs PICC when able. TCU vs home with [facility].
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
## 2021-11-10 NOTE — PLAN OF CARE
A&O, VSS room air. Tele: SB/SR, HR 50-60's. Denies CP and SOB. Continue IV ABX intermittent. Pt c/o back pain controlled with scheduled tylenol and Voltaren cream and PRN PO dilaudid and flexeril. Plan for placement of PICC line for at home antibiotics. Continue blood cultures.

## 2021-11-10 NOTE — CONSULTS
Care Management Initial Consult    General Information  Assessment completed with: Patient,  (Patient)  Type of CM/SW Visit: Initial Assessment    Primary Care Provider verified and updated as needed: Yes   Readmission within the last 30 days:        Reason for Consult: discharge planning  Advance Care Planning: Advance Care Planning Reviewed:  (no ACP documents)          Communication Assessment  Patient's communication style: spoken language (English or Bilingual)    Hearing Difficulty or Deaf: no   Wear Glasses or Blind: no    Cognitive  Cognitive/Neuro/Behavioral: WDL  Level of Consciousness: alert  Arousal Level: opens eyes spontaneously  Orientation: oriented x 4  Mood/Behavior: calm,cooperative  Best Language: 0 - No aphasia  Speech: clear,spontaneous    Living Environment:   People in home: child(jose a), adult   (Rodriguez)  Current living Arrangements: other (see comments) ([facility])      Able to return to prior arrangements: no       Family/Social Support:  Care provided by: self  Provides care for: no one  Marital Status: Single  Children          Description of Support System: Supportive,Involved    Support Assessment: Adequate family and caregiver support,Adequate social supports    Current Resources:   Patient receiving home care services: No     Community Resources:    Equipment currently used at home: cane, straight  Supplies currently used at home:      Employment/Financial:  Employment Status: retired        Financial Concerns: No concerns identified           Lifestyle & Psychosocial Needs:  Social Determinants of Health     Tobacco Use: Not on file   Alcohol Use: Not on file   Financial Resource Strain: Not on file   Food Insecurity: Not on file   Transportation Needs: Not on file   Physical Activity: Not on file   Stress: Not on file   Social Connections: Not on file   Intimate Partner Violence: Not on file   Depression: Not on file   Housing Stability: Not on file       Functional Status:  Prior to  admission patient needed assistance:              Mental Health Status:          Chemical Dependency Status:                Values/Beliefs:  Spiritual, Cultural Beliefs, Rastafari Practices, Values that affect care: no               Additional Information:  Per care management/social work consult for discharge planning and TCU placement on discharge.  Patient was admitted on 11-6-21 with endocarditis.   The tentative date of discharge is 11-10-21 or 11-11-21.  Reviewed chart and spoke with patient regarding discharge plans.  Per patient report, he lives with his son in a townhouse with 1 step to enter and 10 step inside.  Patient uses a straight cane at baseline.  Reviewed the therapy discharge recommendations of TCU placement on discharge and patient is in agreement.  Patient is also in need of IV ABX and he has no home coverage.   Referrals sent, via discharge on the double, to check bed availability at [facility], [facility], and [facility].  Patient has [employer] and needs pre-auth from his insurance prior to discharge.    Will continue to follow.      BENJAMIN Ríos, [facility]    [phone]  [facility]

## 2021-11-11 LAB — INR PPP: 3.2 (ref 0.85–1.15)

## 2021-11-11 PROCEDURE — 85610 PROTHROMBIN TIME: CPT | Performed by: STUDENT IN AN ORGANIZED HEALTH CARE EDUCATION/TRAINING PROGRAM

## 2021-11-11 PROCEDURE — 999N000190 HC STATISTIC VAT ROUNDS

## 2021-11-11 PROCEDURE — 250N000011 HC RX IP 250 OP 636: Performed by: STUDENT IN AN ORGANIZED HEALTH CARE EDUCATION/TRAINING PROGRAM

## 2021-11-11 PROCEDURE — 210N000002 HC R&B HEART CARE

## 2021-11-11 PROCEDURE — 250N000013 HC RX MED GY IP 250 OP 250 PS 637: Performed by: INTERNAL MEDICINE

## 2021-11-11 PROCEDURE — 250N000013 HC RX MED GY IP 250 OP 250 PS 637: Performed by: STUDENT IN AN ORGANIZED HEALTH CARE EDUCATION/TRAINING PROGRAM

## 2021-11-11 PROCEDURE — 99233 SBSQ HOSP IP/OBS HIGH 50: CPT | Performed by: INTERNAL MEDICINE

## 2021-11-11 PROCEDURE — 87040 BLOOD CULTURE FOR BACTERIA: CPT | Performed by: INTERNAL MEDICINE

## 2021-11-11 RX ORDER — WARFARIN SODIUM 4 MG/1
4 TABLET ORAL
Status: COMPLETED | OUTPATIENT
Start: 2021-11-11 | End: 2021-11-11

## 2021-11-11 RX ADMIN — FINASTERIDE 5 MG: 5 TABLET, FILM COATED ORAL at 08:21

## 2021-11-11 RX ADMIN — METOPROLOL SUCCINATE 25 MG: 25 TABLET, EXTENDED RELEASE ORAL at 08:21

## 2021-11-11 RX ADMIN — TAMSULOSIN HYDROCHLORIDE 0.4 MG: 0.4 CAPSULE ORAL at 08:20

## 2021-11-11 RX ADMIN — AMPICILLIN SODIUM 2 G: 2 INJECTION, POWDER, FOR SOLUTION INTRAVENOUS at 08:20

## 2021-11-11 RX ADMIN — AMPICILLIN SODIUM 2 G: 2 INJECTION, POWDER, FOR SOLUTION INTRAVENOUS at 12:06

## 2021-11-11 RX ADMIN — HYDROMORPHONE HYDROCHLORIDE 4 MG: 2 TABLET ORAL at 03:52

## 2021-11-11 RX ADMIN — AMPICILLIN SODIUM 2 G: 2 INJECTION, POWDER, FOR SOLUTION INTRAVENOUS at 03:52

## 2021-11-11 RX ADMIN — WARFARIN SODIUM 4 MG: 4 TABLET ORAL at 17:26

## 2021-11-11 RX ADMIN — ACETAMINOPHEN 1000 MG: 500 TABLET, FILM COATED ORAL at 20:34

## 2021-11-11 RX ADMIN — GABAPENTIN 100 MG: 100 CAPSULE ORAL at 21:01

## 2021-11-11 RX ADMIN — ACETAMINOPHEN 1000 MG: 500 TABLET, FILM COATED ORAL at 08:21

## 2021-11-11 RX ADMIN — GABAPENTIN 100 MG: 100 CAPSULE ORAL at 08:21

## 2021-11-11 RX ADMIN — ATORVASTATIN CALCIUM 20 MG: 20 TABLET, FILM COATED ORAL at 08:21

## 2021-11-11 RX ADMIN — GABAPENTIN 100 MG: 100 CAPSULE ORAL at 15:57

## 2021-11-11 RX ADMIN — SENNOSIDES AND DOCUSATE SODIUM 2 TABLET: 8.6; 5 TABLET ORAL at 11:09

## 2021-11-11 RX ADMIN — ACETAMINOPHEN 1000 MG: 500 TABLET, FILM COATED ORAL at 12:06

## 2021-11-11 RX ADMIN — AMPICILLIN SODIUM 2 G: 2 INJECTION, POWDER, FOR SOLUTION INTRAVENOUS at 20:34

## 2021-11-11 RX ADMIN — AMPICILLIN SODIUM 2 G: 2 INJECTION, POWDER, FOR SOLUTION INTRAVENOUS at 15:58

## 2021-11-11 RX ADMIN — CYCLOBENZAPRINE 5 MG: 5 TABLET, FILM COATED ORAL at 10:43

## 2021-11-11 RX ADMIN — HYDROMORPHONE HYDROCHLORIDE 4 MG: 2 TABLET ORAL at 07:34

## 2021-11-11 RX ADMIN — CYCLOBENZAPRINE 5 MG: 5 TABLET, FILM COATED ORAL at 20:58

## 2021-11-11 RX ADMIN — HYDROMORPHONE HYDROCHLORIDE 4 MG: 2 TABLET ORAL at 17:25

## 2021-11-11 RX ADMIN — CEFTRIAXONE SODIUM 2 G: 2 INJECTION, POWDER, FOR SOLUTION INTRAMUSCULAR; INTRAVENOUS at 20:55

## 2021-11-11 RX ADMIN — CEFTRIAXONE SODIUM 2 G: 2 INJECTION, POWDER, FOR SOLUTION INTRAMUSCULAR; INTRAVENOUS at 08:20

## 2021-11-11 RX ADMIN — HYDROMORPHONE HYDROCHLORIDE 4 MG: 2 TABLET ORAL at 11:09

## 2021-11-11 RX ADMIN — LOSARTAN POTASSIUM 100 MG: 100 TABLET, FILM COATED ORAL at 08:21

## 2021-11-11 RX ADMIN — DICLOFENAC SODIUM 4 G: 10 GEL TOPICAL at 15:57

## 2021-11-11 RX ADMIN — AMLODIPINE BESYLATE 2.5 MG: 2.5 TABLET ORAL at 08:21

## 2021-11-11 RX ADMIN — DICLOFENAC SODIUM 4 G: 10 GEL TOPICAL at 14:03

## 2021-11-11 RX ADMIN — HYDROMORPHONE HYDROCHLORIDE 4 MG: 2 TABLET ORAL at 14:06

## 2021-11-11 RX ADMIN — DICLOFENAC SODIUM 4 G: 10 GEL TOPICAL at 21:01

## 2021-11-11 RX ADMIN — HYDROMORPHONE HYDROCHLORIDE 4 MG: 2 TABLET ORAL at 00:23

## 2021-11-11 RX ADMIN — AMPICILLIN SODIUM 2 G: 2 INJECTION, POWDER, FOR SOLUTION INTRAVENOUS at 00:27

## 2021-11-11 ASSESSMENT — ACTIVITIES OF DAILY LIVING (ADL)
ADLS_ACUITY_SCORE: 11
ADLS_ACUITY_SCORE: 10
ADLS_ACUITY_SCORE: 13
ADLS_ACUITY_SCORE: 11
ADLS_ACUITY_SCORE: 9
ADLS_ACUITY_SCORE: 13
ADLS_ACUITY_SCORE: 11
ADLS_ACUITY_SCORE: 13
ADLS_ACUITY_SCORE: 11
ADLS_ACUITY_SCORE: 13
ADLS_ACUITY_SCORE: 11
ADLS_ACUITY_SCORE: 13
ADLS_ACUITY_SCORE: 13
ADLS_ACUITY_SCORE: 11
ADLS_ACUITY_SCORE: 11
ADLS_ACUITY_SCORE: 9
ADLS_ACUITY_SCORE: 13
ADLS_ACUITY_SCORE: 13

## 2021-11-11 NOTE — PLAN OF CARE
A&Ox4. VSS on RA. Home CPAP at night. Tele: SR with first degree AVB. Up with A2 pivot. Cardiac diet. C/o lower back pain, managed with PRN PO Dilaudid and Flexeril. LS clear. Denies SOB. Self straight caths BID. Large BMx1. IV-SL and PICC RUE with intermittent antibiotics. Discharge pending. Continue to monitor.

## 2021-11-11 NOTE — PROGRESS NOTES
Care Management Follow Up    Length of Stay (days): 5    Expected Discharge Date: 11/12/2021     Concerns to be Addressed: discharge planning     Patient plan of care discussed at interdisciplinary rounds: Yes    Anticipated Discharge Disposition: Transitional Care     Anticipated Discharge Services: Other (see comment) (therapy, IV ABX)  Anticipated Discharge DME:      Patient/family educated on Medicare website which has current facility and service quality ratings: no  Education Provided on the Discharge Plan:    Patient/Family in Agreement with the Plan: yes    Referrals Placed by CM/SW: Post Acute Facilities  Private pay costs discussed: transportation costs    Additional Information:  [facility] accepted patient for 11/12.  [facility] requested an arrival of 1500 to give them time to complete [employer] pre-auth form. SW met with patient and notified him of the room being semi private, patient accepted bed. Patient was advised about transportation costs and agreed to pay.    Transportation is set through [facility] for 11/12 1500 to [facility] via wheelchair.   -------------------------------------------------  D: Per DHS regulation, SW completed and submitted PAS-RR to [place] [employer] on Aging Direct Connect via the Senior LinkAge Line.  PAS-RR confirmation # is : [id number]    I: SW spoke with patient and they are aware a PAS-RR has been submitted.  SW reviewed with patient that they may be contacted for a follow up appointment within 10 days of hospital discharge if their SNF stay is < 30 days.  Contact information for [facility] was also provided.    A: patient verbalized understanding.    P: Further questions may be directed to [facility] at #[phone], option #4 for PAS-RR staff.    PRICE Pinto

## 2021-11-11 NOTE — PLAN OF CARE
[id number]- A&Ox4. VSS on RA. Home cpap at bedtime. Tele SR 1AVB. Denies CP or shortness of breath. LS clear. Using prn dilaudid, scheduled tylenol, scheduled voltaren gel and ice for pain control. R PICC, double lumen, good blood return, unit lab collect. Reports constipation, had small BM 11-10. Straight caths BID at home. Up with 2 assist, pivot, needs encouragement to move. Plan for 6 weeks of IV antibiotics, dismissal to TCU.    2130 - Transferred with all belongings to [facility].

## 2021-11-11 NOTE — PLAN OF CARE
Picc line inserted today, plan for outpt antibiotics when stable Pain continues to be severe, he worked with PT and ambulated to the door and back to chair in his room. SR w 1*Deg AVB. Continues on IV abts. St cathed this am for 1000 ml urine. Bladder scan 750.   Plan for TCU at discharge
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
## 2021-11-11 NOTE — PLAN OF CARE
Pt is A/Ox4. Pain controlled with scheduled/PRN meds, ice, and abd binder. Up with A1, gaitbelt+walker. Tolerated ambulation in hallway x2. Assisting/following pt self-cath routine. Tele: SR w/ 1st degree AV block. Plan for TCU tomorrow, transport scheduled for 1500.
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
## 2021-11-12 ENCOUNTER — LAB REQUISITION (OUTPATIENT)
Dept: LAB | Facility: CLINIC | Age: 76
End: 2021-11-12
Payer: COMMERCIAL

## 2021-11-12 VITALS
OXYGEN SATURATION: 97 % | DIASTOLIC BLOOD PRESSURE: 69 MMHG | BODY MASS INDEX: 31.43 KG/M2 | TEMPERATURE: 97.7 F | SYSTOLIC BLOOD PRESSURE: 152 MMHG | WEIGHT: 212.8 LBS | RESPIRATION RATE: 18 BRPM | HEART RATE: 66 BPM

## 2021-11-12 DIAGNOSIS — Z11.1 ENCOUNTER FOR SCREENING FOR RESPIRATORY TUBERCULOSIS: ICD-10-CM

## 2021-11-12 LAB
ANION GAP SERPL CALCULATED.3IONS-SCNC: 1 MMOL/L (ref 3–14)
BACTERIA BLD CULT: NO GROWTH
BUN SERPL-MCNC: 16 MG/DL (ref 7–30)
CALCIUM SERPL-MCNC: 9.1 MG/DL (ref 8.5–10.1)
CHLORIDE BLD-SCNC: 107 MMOL/L (ref 94–109)
CO2 SERPL-SCNC: 29 MMOL/L (ref 20–32)
CREAT SERPL-MCNC: 0.68 MG/DL (ref 0.66–1.25)
ERYTHROCYTE [DISTWIDTH] IN BLOOD BY AUTOMATED COUNT: 13.2 % (ref 10–15)
GFR SERPL CREATININE-BSD FRML MDRD: >90 ML/MIN/1.73M2
GLUCOSE BLD-MCNC: 102 MG/DL (ref 70–99)
HCT VFR BLD AUTO: 32.6 % (ref 40–53)
HGB BLD-MCNC: 10.3 G/DL (ref 13.3–17.7)
INR PPP: 2.95 (ref 0.85–1.15)
MCH RBC QN AUTO: 28.7 PG (ref 26.5–33)
MCHC RBC AUTO-ENTMCNC: 31.6 G/DL (ref 31.5–36.5)
MCV RBC AUTO: 91 FL (ref 78–100)
PLATELET # BLD AUTO: 265 10E3/UL (ref 150–450)
POTASSIUM BLD-SCNC: 4.3 MMOL/L (ref 3.4–5.3)
RBC # BLD AUTO: 3.59 10E6/UL (ref 4.4–5.9)
SODIUM SERPL-SCNC: 137 MMOL/L (ref 133–144)
WBC # BLD AUTO: 6 10E3/UL (ref 4–11)

## 2021-11-12 PROCEDURE — 36415 COLL VENOUS BLD VENIPUNCTURE: CPT | Performed by: INTERNAL MEDICINE

## 2021-11-12 PROCEDURE — 82310 ASSAY OF CALCIUM: CPT | Performed by: INTERNAL MEDICINE

## 2021-11-12 PROCEDURE — 250N000011 HC RX IP 250 OP 636: Performed by: STUDENT IN AN ORGANIZED HEALTH CARE EDUCATION/TRAINING PROGRAM

## 2021-11-12 PROCEDURE — 87040 BLOOD CULTURE FOR BACTERIA: CPT | Performed by: INTERNAL MEDICINE

## 2021-11-12 PROCEDURE — 250N000013 HC RX MED GY IP 250 OP 250 PS 637: Performed by: STUDENT IN AN ORGANIZED HEALTH CARE EDUCATION/TRAINING PROGRAM

## 2021-11-12 PROCEDURE — 99239 HOSP IP/OBS DSCHRG MGMT >30: CPT | Performed by: INTERNAL MEDICINE

## 2021-11-12 PROCEDURE — 250N000013 HC RX MED GY IP 250 OP 250 PS 637: Performed by: INTERNAL MEDICINE

## 2021-11-12 PROCEDURE — 85027 COMPLETE CBC AUTOMATED: CPT | Performed by: INTERNAL MEDICINE

## 2021-11-12 PROCEDURE — 999N000190 HC STATISTIC VAT ROUNDS

## 2021-11-12 PROCEDURE — 85610 PROTHROMBIN TIME: CPT | Performed by: STUDENT IN AN ORGANIZED HEALTH CARE EDUCATION/TRAINING PROGRAM

## 2021-11-12 RX ORDER — CYCLOBENZAPRINE HCL 5 MG
5 TABLET ORAL 3 TIMES DAILY PRN
Qty: 10 TABLET | Refills: 0 | Status: ON HOLD | OUTPATIENT
Start: 2021-11-12 | End: 2022-02-11

## 2021-11-12 RX ORDER — GABAPENTIN 100 MG/1
100 CAPSULE ORAL 3 TIMES DAILY
Refills: 0 | Status: ON HOLD
Start: 2021-11-12 | End: 2022-02-11

## 2021-11-12 RX ORDER — ACETAMINOPHEN 500 MG
1000 TABLET ORAL
Status: ON HOLD
Start: 2021-11-12 | End: 2022-02-11

## 2021-11-12 RX ORDER — AMPICILLIN 2 G/1
2 INJECTION, POWDER, FOR SOLUTION INTRAVENOUS EVERY 4 HOURS
Qty: 1632 ML | Refills: 0 | Status: SHIPPED | OUTPATIENT
Start: 2021-11-12 | End: 2021-11-12

## 2021-11-12 RX ORDER — ACETAMINOPHEN 325 MG/1
650 TABLET ORAL EVERY 4 HOURS PRN
Refills: 0 | Status: ON HOLD
Start: 2021-11-12 | End: 2024-10-05

## 2021-11-12 RX ORDER — AMPICILLIN 2 G/1
2 INJECTION, POWDER, FOR SOLUTION INTRAVENOUS EVERY 4 HOURS
Qty: 1632 ML | Refills: 0 | Status: ON HOLD | OUTPATIENT
Start: 2021-11-12 | End: 2022-02-11

## 2021-11-12 RX ORDER — POLYETHYLENE GLYCOL 3350 17 G/17G
17 POWDER, FOR SOLUTION ORAL DAILY PRN
Refills: 0 | Status: ON HOLD
Start: 2021-11-12 | End: 2022-02-11

## 2021-11-12 RX ORDER — HYDROMORPHONE HYDROCHLORIDE 2 MG/1
2 TABLET ORAL 4 TIMES DAILY PRN
Qty: 15 TABLET | Refills: 0 | Status: ON HOLD | OUTPATIENT
Start: 2021-11-12 | End: 2022-02-11

## 2021-11-12 RX ADMIN — AMPICILLIN SODIUM 2 G: 2 INJECTION, POWDER, FOR SOLUTION INTRAVENOUS at 14:42

## 2021-11-12 RX ADMIN — TAMSULOSIN HYDROCHLORIDE 0.4 MG: 0.4 CAPSULE ORAL at 08:51

## 2021-11-12 RX ADMIN — HYDROMORPHONE HYDROCHLORIDE 4 MG: 2 TABLET ORAL at 04:19

## 2021-11-12 RX ADMIN — CYCLOBENZAPRINE 5 MG: 5 TABLET, FILM COATED ORAL at 08:51

## 2021-11-12 RX ADMIN — ACETAMINOPHEN 1000 MG: 500 TABLET, FILM COATED ORAL at 12:57

## 2021-11-12 RX ADMIN — DICLOFENAC SODIUM 4 G: 10 GEL TOPICAL at 08:55

## 2021-11-12 RX ADMIN — AMPICILLIN SODIUM 2 G: 2 INJECTION, POWDER, FOR SOLUTION INTRAVENOUS at 08:44

## 2021-11-12 RX ADMIN — AMPICILLIN SODIUM 2 G: 2 INJECTION, POWDER, FOR SOLUTION INTRAVENOUS at 00:33

## 2021-11-12 RX ADMIN — LOSARTAN POTASSIUM 100 MG: 100 TABLET, FILM COATED ORAL at 08:51

## 2021-11-12 RX ADMIN — AMPICILLIN SODIUM 2 G: 2 INJECTION, POWDER, FOR SOLUTION INTRAVENOUS at 04:19

## 2021-11-12 RX ADMIN — HYDROMORPHONE HYDROCHLORIDE 4 MG: 2 TABLET ORAL at 08:52

## 2021-11-12 RX ADMIN — DICLOFENAC SODIUM 4 G: 10 GEL TOPICAL at 13:00

## 2021-11-12 RX ADMIN — AMLODIPINE BESYLATE 2.5 MG: 2.5 TABLET ORAL at 08:51

## 2021-11-12 RX ADMIN — HYDROMORPHONE HYDROCHLORIDE 4 MG: 2 TABLET ORAL at 13:07

## 2021-11-12 RX ADMIN — METOPROLOL SUCCINATE 25 MG: 25 TABLET, EXTENDED RELEASE ORAL at 08:51

## 2021-11-12 RX ADMIN — ACETAMINOPHEN 1000 MG: 500 TABLET, FILM COATED ORAL at 08:51

## 2021-11-12 RX ADMIN — CEFTRIAXONE SODIUM 2 G: 2 INJECTION, POWDER, FOR SOLUTION INTRAMUSCULAR; INTRAVENOUS at 09:02

## 2021-11-12 RX ADMIN — SENNOSIDES AND DOCUSATE SODIUM 2 TABLET: 8.6; 5 TABLET ORAL at 09:13

## 2021-11-12 RX ADMIN — DICLOFENAC SODIUM 4 G: 10 GEL TOPICAL at 14:45

## 2021-11-12 RX ADMIN — FINASTERIDE 5 MG: 5 TABLET, FILM COATED ORAL at 08:51

## 2021-11-12 RX ADMIN — ATORVASTATIN CALCIUM 20 MG: 20 TABLET, FILM COATED ORAL at 08:51

## 2021-11-12 RX ADMIN — AMPICILLIN SODIUM 2 G: 2 INJECTION, POWDER, FOR SOLUTION INTRAVENOUS at 12:57

## 2021-11-12 RX ADMIN — GABAPENTIN 100 MG: 100 CAPSULE ORAL at 14:45

## 2021-11-12 RX ADMIN — GABAPENTIN 100 MG: 100 CAPSULE ORAL at 08:51

## 2021-11-12 ASSESSMENT — ACTIVITIES OF DAILY LIVING (ADL)
ADLS_ACUITY_SCORE: 10

## 2021-11-12 NOTE — PLAN OF CARE
Physical Therapy Discharge Summary    Reason for therapy discharge:    Discharged to transitional care facility.    Progress towards therapy goal(s). See goals on Care Plan in [facility] electronic health record for goal details.  Goals not met.  Barriers to achieving goals:   discharge from facility.    Therapy recommendation(s):    Continued therapy is recommended.  Rationale/Recommendations:  To progress independence and safety with functional mobilty.

## 2021-11-12 NOTE — PROGRESS NOTES
[facility]charge Nursing Note    Patient Information  Name: Celia Stallings  Age: 76 year old    Discharge Plans:   Discharge location: to [facility] TCU  Discharge ride contacted: Yes - EMS transport  Approximate discharge time: 1500    Discharge Criteria:  Discharge criteria met and vital signs stable: Yes    Patient Belongs:  Patient belongings returned to patient: Yes - all belongings returned to pt     Lidia Varghese RN
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
## 2021-11-12 NOTE — PROGRESS NOTES
Note placement wo pump capability, no option but q 4 hrd with the incumbant pt and facility inconvenience this will be  Orders changed

## 2021-11-12 NOTE — PROGRESS NOTES
Pt is A&O x 4. VSS on RA, c/o back pain managed with scheduled Tylenol, PRN dilaudid and flexeril. Denied SOB/CP.  Straight cathes routine. Up x 1 assist with walker/GB. Tele SR with 1st degree AVB. PICC line on Left AC. Anticipated discharge to TCU today. Continue to monitor.

## 2021-11-12 NOTE — PLAN OF CARE
[facility] Nursing [facility]    Patient Information  Name: Celia Stallings  Age: 76 year old    Assessment  Orientation/Neuro: A&O x 4  Cardiac/Tele: SR with 1 degree AVB  Resp: ROSALES otherwise WDL on RA  GI/: WDL except PRN senna given for constipation prevention  CMS: WDL  Mobility: A1 with walker and gait belt  Pain: Back pain rated 3/10 treated with oral dilaudid which was effective  Diet: Orders Placed This Encounter      Low Saturated Fat Na <2400 mg      Diet      Vital Signs  B/P: 152/69, T: 97.7, P: 66, R: 18, O2: 96% on RA    Plan  Plan/Other: Discharge to TCU today    Lidia Varghese RN

## 2021-11-12 NOTE — PROGRESS NOTES
Care Management Discharge Note    Discharge Date: 11/05/2021  Expected Time of Departure: 15:00    Discharge Disposition: Transitional Care    Discharge Services: Other (see comment) (therapy, IV ABX)    Discharge DME: None    Discharge Transportation: agency ([employer] wheelchair transport at 15:00)    Private pay costs discussed: transportation costs    PAS Confirmation Code: [id number]  Patient/family educated on Medicare website which has current facility and service quality ratings: no    Education Provided on the Discharge Plan:  yes  Persons Notified of Discharge Plans: patient  Patient/Family in Agreement with the Plan: yes    Handoff Referral Completed: No    Additional Information:  Received discharge orders for patient.  Bed available at [facility] for today.  Per Ursula, they have received pre-auth from [employer].   [employer] wheelchair transport arranged for 15:00 today.  Patient informed of the plan and in agreement to the plan.  Call placed to update [facility] and faxed the orders.        BENJAMIN Ríos, [facility]    [phone]  [facility]
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
## 2021-11-13 LAB — BACTERIA BLD CULT: NO GROWTH

## 2021-11-13 PROCEDURE — 86481 TB AG RESPONSE T-CELL SUSP: CPT | Mod: ORL | Performed by: GENERAL PRACTICE

## 2021-11-13 PROCEDURE — 36415 COLL VENOUS BLD VENIPUNCTURE: CPT | Mod: ORL | Performed by: GENERAL PRACTICE

## 2021-11-13 PROCEDURE — P9603 ONE-WAY ALLOW PRORATED MILES: HCPCS | Mod: ORL | Performed by: GENERAL PRACTICE

## 2021-11-14 ENCOUNTER — LAB REQUISITION (OUTPATIENT)
Dept: LAB | Facility: CLINIC | Age: 76
End: 2021-11-14
Payer: COMMERCIAL

## 2021-11-14 DIAGNOSIS — I33.0 ACUTE AND SUBACUTE INFECTIVE ENDOCARDITIS: ICD-10-CM

## 2021-11-14 LAB — BACTERIA BLD CULT: NO GROWTH

## 2021-11-15 LAB
ALT SERPL W P-5'-P-CCNC: 21 U/L (ref 0–45)
AST SERPL W P-5'-P-CCNC: 22 U/L (ref 0–40)
BACTERIA BLD CULT: NO GROWTH
BASOPHILS # BLD AUTO: 0 10E3/UL (ref 0–0.2)
BASOPHILS NFR BLD AUTO: 1 %
C REACTIVE PROTEIN LHE: 0.7 MG/DL (ref 0–0.8)
CREAT SERPL-MCNC: 0.76 MG/DL (ref 0.7–1.3)
EOSINOPHIL # BLD AUTO: 0.2 10E3/UL (ref 0–0.7)
EOSINOPHIL NFR BLD AUTO: 3 %
ERYTHROCYTE [DISTWIDTH] IN BLOOD BY AUTOMATED COUNT: 13.6 % (ref 10–15)
ERYTHROCYTE [SEDIMENTATION RATE] IN BLOOD BY WESTERGREN METHOD: 53 MM/HR (ref 0–15)
GAMMA INTERFERON BACKGROUND BLD IA-ACNC: 0.01 IU/ML
GFR SERPL CREATININE-BSD FRML MDRD: 87 ML/MIN/1.73M2
HCT VFR BLD AUTO: 35.7 % (ref 40–53)
HGB BLD-MCNC: 11.3 G/DL (ref 13.3–17.7)
IMM GRANULOCYTES # BLD: 0 10E3/UL
IMM GRANULOCYTES NFR BLD: 1 %
LYMPHOCYTES # BLD AUTO: 1.3 10E3/UL (ref 0.8–5.3)
LYMPHOCYTES NFR BLD AUTO: 21 %
M TB IFN-G BLD-IMP: NEGATIVE
M TB IFN-G CD4+ BCKGRND COR BLD-ACNC: 2.17 IU/ML
MCH RBC QN AUTO: 29.1 PG (ref 26.5–33)
MCHC RBC AUTO-ENTMCNC: 31.7 G/DL (ref 31.5–36.5)
MCV RBC AUTO: 92 FL (ref 78–100)
MITOGEN IGNF BCKGRD COR BLD-ACNC: 0 IU/ML
MITOGEN IGNF BCKGRD COR BLD-ACNC: 0.01 IU/ML
MONOCYTES # BLD AUTO: 0.4 10E3/UL (ref 0–1.3)
MONOCYTES NFR BLD AUTO: 7 %
NEUTROPHILS # BLD AUTO: 4.5 10E3/UL (ref 1.6–8.3)
NEUTROPHILS NFR BLD AUTO: 67 %
NRBC # BLD AUTO: 0 10E3/UL
NRBC BLD AUTO-RTO: 0 /100
PLATELET # BLD AUTO: 295 10E3/UL (ref 150–450)
QUANTIFERON MITOGEN: 2.18 IU/ML
QUANTIFERON NIL TUBE: 0.01 IU/ML
QUANTIFERON TB1 TUBE: 0.02 IU/ML
QUANTIFERON TB2 TUBE: 0.01
RBC # BLD AUTO: 3.88 10E6/UL (ref 4.4–5.9)
WBC # BLD AUTO: 6.5 10E3/UL (ref 4–11)

## 2021-11-15 PROCEDURE — 84460 ALANINE AMINO (ALT) (SGPT): CPT | Mod: ORL | Performed by: GENERAL PRACTICE

## 2021-11-15 PROCEDURE — 36415 COLL VENOUS BLD VENIPUNCTURE: CPT | Performed by: GENERAL PRACTICE

## 2021-11-15 PROCEDURE — 84450 TRANSFERASE (AST) (SGOT): CPT | Mod: ORL | Performed by: GENERAL PRACTICE

## 2021-11-15 PROCEDURE — 82565 ASSAY OF CREATININE: CPT | Mod: ORL | Performed by: GENERAL PRACTICE

## 2021-11-15 PROCEDURE — 85652 RBC SED RATE AUTOMATED: CPT | Performed by: GENERAL PRACTICE

## 2021-11-15 PROCEDURE — 85025 COMPLETE CBC W/AUTO DIFF WBC: CPT | Performed by: GENERAL PRACTICE

## 2021-11-15 PROCEDURE — 86140 C-REACTIVE PROTEIN: CPT | Performed by: GENERAL PRACTICE

## 2021-11-15 PROCEDURE — 86141 C-REACTIVE PROTEIN HS: CPT | Mod: ORL | Performed by: GENERAL PRACTICE

## 2021-11-16 LAB — BACTERIA BLD CULT: NO GROWTH

## 2021-11-17 LAB
BACTERIA BLD CULT: NO GROWTH
BACTERIA BLD CULT: NO GROWTH

## 2021-11-20 ENCOUNTER — LAB REQUISITION (OUTPATIENT)
Dept: LAB | Facility: CLINIC | Age: 76
End: 2021-11-20
Payer: COMMERCIAL

## 2021-11-20 DIAGNOSIS — I33.0 ACUTE AND SUBACUTE INFECTIVE ENDOCARDITIS: ICD-10-CM

## 2021-11-22 LAB
ALT SERPL W P-5'-P-CCNC: 11 U/L (ref 0–45)
AST SERPL W P-5'-P-CCNC: 16 U/L (ref 0–40)
BASOPHILS # BLD AUTO: 0 10E3/UL (ref 0–0.2)
BASOPHILS NFR BLD AUTO: 1 %
C REACTIVE PROTEIN LHE: 0.2 MG/DL (ref 0–0.8)
CREAT SERPL-MCNC: 0.65 MG/DL (ref 0.7–1.3)
EOSINOPHIL # BLD AUTO: 0.4 10E3/UL (ref 0–0.7)
EOSINOPHIL NFR BLD AUTO: 10 %
ERYTHROCYTE [DISTWIDTH] IN BLOOD BY AUTOMATED COUNT: 14.2 % (ref 10–15)
ERYTHROCYTE [SEDIMENTATION RATE] IN BLOOD BY WESTERGREN METHOD: 28 MM/HR (ref 0–15)
GFR SERPL CREATININE-BSD FRML MDRD: >90 ML/MIN/1.73M2
HCT VFR BLD AUTO: 32.5 % (ref 40–53)
HGB BLD-MCNC: 10.5 G/DL (ref 13.3–17.7)
IMM GRANULOCYTES # BLD: 0 10E3/UL
IMM GRANULOCYTES NFR BLD: 1 %
LYMPHOCYTES # BLD AUTO: 1.1 10E3/UL (ref 0.8–5.3)
LYMPHOCYTES NFR BLD AUTO: 26 %
MCH RBC QN AUTO: 29.2 PG (ref 26.5–33)
MCHC RBC AUTO-ENTMCNC: 32.3 G/DL (ref 31.5–36.5)
MCV RBC AUTO: 91 FL (ref 78–100)
MONOCYTES # BLD AUTO: 0.3 10E3/UL (ref 0–1.3)
MONOCYTES NFR BLD AUTO: 8 %
NEUTROPHILS # BLD AUTO: 2.3 10E3/UL (ref 1.6–8.3)
NEUTROPHILS NFR BLD AUTO: 54 %
NRBC # BLD AUTO: 0 10E3/UL
NRBC BLD AUTO-RTO: 0 /100
PLATELET # BLD AUTO: 192 10E3/UL (ref 150–450)
RBC # BLD AUTO: 3.59 10E6/UL (ref 4.4–5.9)
WBC # BLD AUTO: 4.1 10E3/UL (ref 4–11)

## 2021-11-22 PROCEDURE — 85025 COMPLETE CBC W/AUTO DIFF WBC: CPT | Mod: ORL | Performed by: GENERAL PRACTICE

## 2021-11-22 PROCEDURE — 36415 COLL VENOUS BLD VENIPUNCTURE: CPT | Mod: ORL | Performed by: GENERAL PRACTICE

## 2021-11-22 PROCEDURE — 82565 ASSAY OF CREATININE: CPT | Performed by: GENERAL PRACTICE

## 2021-11-22 PROCEDURE — P9604 ONE-WAY ALLOW PRORATED TRIP: HCPCS | Performed by: GENERAL PRACTICE

## 2021-11-22 PROCEDURE — 84460 ALANINE AMINO (ALT) (SGPT): CPT | Mod: ORL | Performed by: GENERAL PRACTICE

## 2021-11-22 PROCEDURE — 85652 RBC SED RATE AUTOMATED: CPT | Mod: ORL | Performed by: GENERAL PRACTICE

## 2021-11-22 PROCEDURE — 84450 TRANSFERASE (AST) (SGOT): CPT | Performed by: GENERAL PRACTICE

## 2021-11-22 PROCEDURE — 86141 C-REACTIVE PROTEIN HS: CPT | Mod: ORL | Performed by: GENERAL PRACTICE

## 2021-11-25 ENCOUNTER — LAB REQUISITION (OUTPATIENT)
Dept: LAB | Facility: CLINIC | Age: 76
End: 2021-11-25
Payer: COMMERCIAL

## 2021-11-25 DIAGNOSIS — I33.0 ACUTE AND SUBACUTE INFECTIVE ENDOCARDITIS: ICD-10-CM

## 2021-11-29 LAB
ALT SERPL W P-5'-P-CCNC: 16 U/L (ref 0–45)
AST SERPL W P-5'-P-CCNC: 18 U/L (ref 0–40)
BASOPHILS # BLD AUTO: 0 10E3/UL (ref 0–0.2)
BASOPHILS NFR BLD AUTO: 1 %
C REACTIVE PROTEIN LHE: 0.1 MG/DL (ref 0–0.8)
CREAT SERPL-MCNC: 0.75 MG/DL (ref 0.7–1.3)
EOSINOPHIL # BLD AUTO: 0.4 10E3/UL (ref 0–0.7)
EOSINOPHIL NFR BLD AUTO: 7 %
ERYTHROCYTE [DISTWIDTH] IN BLOOD BY AUTOMATED COUNT: 14.8 % (ref 10–15)
ERYTHROCYTE [SEDIMENTATION RATE] IN BLOOD BY WESTERGREN METHOD: 15 MM/HR (ref 0–15)
GFR SERPL CREATININE-BSD FRML MDRD: 88 ML/MIN/1.73M2
HCT VFR BLD AUTO: 37.9 % (ref 40–53)
HGB BLD-MCNC: 12.2 G/DL (ref 13.3–17.7)
IMM GRANULOCYTES # BLD: 0 10E3/UL
IMM GRANULOCYTES NFR BLD: 1 %
LYMPHOCYTES # BLD AUTO: 1.5 10E3/UL (ref 0.8–5.3)
LYMPHOCYTES NFR BLD AUTO: 26 %
MCH RBC QN AUTO: 29.3 PG (ref 26.5–33)
MCHC RBC AUTO-ENTMCNC: 32.2 G/DL (ref 31.5–36.5)
MCV RBC AUTO: 91 FL (ref 78–100)
MONOCYTES # BLD AUTO: 0.5 10E3/UL (ref 0–1.3)
MONOCYTES NFR BLD AUTO: 9 %
NEUTROPHILS # BLD AUTO: 3.2 10E3/UL (ref 1.6–8.3)
NEUTROPHILS NFR BLD AUTO: 56 %
NRBC # BLD AUTO: 0 10E3/UL
NRBC BLD AUTO-RTO: 0 /100
PLATELET # BLD AUTO: 226 10E3/UL (ref 150–450)
RBC # BLD AUTO: 4.16 10E6/UL (ref 4.4–5.9)
WBC # BLD AUTO: 5.6 10E3/UL (ref 4–11)

## 2021-11-29 PROCEDURE — 84460 ALANINE AMINO (ALT) (SGPT): CPT | Performed by: GENERAL PRACTICE

## 2021-11-29 PROCEDURE — 82565 ASSAY OF CREATININE: CPT | Performed by: GENERAL PRACTICE

## 2021-11-29 PROCEDURE — 36415 COLL VENOUS BLD VENIPUNCTURE: CPT | Mod: ORL | Performed by: GENERAL PRACTICE

## 2021-11-29 PROCEDURE — P9604 ONE-WAY ALLOW PRORATED TRIP: HCPCS | Performed by: GENERAL PRACTICE

## 2021-11-29 PROCEDURE — 86140 C-REACTIVE PROTEIN: CPT | Performed by: GENERAL PRACTICE

## 2021-11-29 PROCEDURE — 84450 TRANSFERASE (AST) (SGOT): CPT | Mod: ORL | Performed by: GENERAL PRACTICE

## 2021-11-29 PROCEDURE — 85025 COMPLETE CBC W/AUTO DIFF WBC: CPT | Mod: ORL | Performed by: GENERAL PRACTICE

## 2021-11-29 PROCEDURE — 85652 RBC SED RATE AUTOMATED: CPT | Mod: ORL | Performed by: GENERAL PRACTICE

## 2021-12-13 ENCOUNTER — LAB REQUISITION (OUTPATIENT)
Dept: LAB | Facility: CLINIC | Age: 76
End: 2021-12-13
Payer: COMMERCIAL

## 2021-12-13 DIAGNOSIS — R78.81 BACTEREMIA: ICD-10-CM

## 2021-12-13 DIAGNOSIS — I34.89 OTHER NONRHEUMATIC MITRAL VALVE DISORDERS: ICD-10-CM

## 2021-12-13 DIAGNOSIS — M46.46 DISCITIS, UNSPECIFIED, LUMBAR REGION: ICD-10-CM

## 2021-12-13 DIAGNOSIS — B95.2 ENTEROCOCCUS AS THE CAUSE OF DISEASES CLASSIFIED ELSEWHERE: ICD-10-CM

## 2021-12-13 DIAGNOSIS — I33.0 ACUTE AND SUBACUTE INFECTIVE ENDOCARDITIS: ICD-10-CM

## 2021-12-16 ENCOUNTER — LAB REQUISITION (OUTPATIENT)
Dept: LAB | Facility: CLINIC | Age: 76
End: 2021-12-16
Payer: COMMERCIAL

## 2021-12-16 DIAGNOSIS — I33.0 ACUTE AND SUBACUTE INFECTIVE ENDOCARDITIS: ICD-10-CM

## 2022-02-11 ENCOUNTER — HOSPITAL ENCOUNTER (INPATIENT)
Facility: CLINIC | Age: 77
LOS: 3 days | Discharge: LEFT BEFORE DISCHARGE | DRG: 908 | End: 2022-02-14
Attending: EMERGENCY MEDICINE | Admitting: INTERNAL MEDICINE
Payer: COMMERCIAL

## 2022-02-11 ENCOUNTER — ANESTHESIA (OUTPATIENT)
Dept: SURGERY | Facility: CLINIC | Age: 77
DRG: 908 | End: 2022-02-11
Payer: COMMERCIAL

## 2022-02-11 ENCOUNTER — APPOINTMENT (OUTPATIENT)
Dept: ULTRASOUND IMAGING | Facility: CLINIC | Age: 77
DRG: 908 | End: 2022-02-11
Attending: EMERGENCY MEDICINE
Payer: COMMERCIAL

## 2022-02-11 ENCOUNTER — ANESTHESIA EVENT (OUTPATIENT)
Dept: SURGERY | Facility: CLINIC | Age: 77
DRG: 908 | End: 2022-02-11
Payer: COMMERCIAL

## 2022-02-11 DIAGNOSIS — R31.0 GROSS HEMATURIA: Primary | ICD-10-CM

## 2022-02-11 DIAGNOSIS — N13.9 ACUTE URINARY OBSTRUCTION: ICD-10-CM

## 2022-02-11 LAB
ABO/RH(D): NORMAL
ALBUMIN SERPL-MCNC: 3.8 G/DL (ref 3.4–5)
ALBUMIN UR-MCNC: 300 MG/DL
ALP SERPL-CCNC: 75 U/L (ref 40–150)
ALT SERPL W P-5'-P-CCNC: 62 U/L (ref 0–70)
ANION GAP SERPL CALCULATED.3IONS-SCNC: 7 MMOL/L (ref 3–14)
ANTIBODY SCREEN: NEGATIVE
APPEARANCE UR: ABNORMAL
AST SERPL W P-5'-P-CCNC: 35 U/L (ref 0–45)
BASOPHILS # BLD AUTO: 0 10E3/UL (ref 0–0.2)
BASOPHILS NFR BLD AUTO: 0 %
BILIRUB SERPL-MCNC: 0.5 MG/DL (ref 0.2–1.3)
BILIRUB UR QL STRIP: NEGATIVE
BUN SERPL-MCNC: 20 MG/DL (ref 7–30)
CALCIUM SERPL-MCNC: 10.4 MG/DL (ref 8.5–10.1)
CHLORIDE BLD-SCNC: 105 MMOL/L (ref 94–109)
CO2 SERPL-SCNC: 25 MMOL/L (ref 20–32)
COLOR UR AUTO: ABNORMAL
CREAT SERPL-MCNC: 1.28 MG/DL (ref 0.66–1.25)
EOSINOPHIL # BLD AUTO: 0.1 10E3/UL (ref 0–0.7)
EOSINOPHIL NFR BLD AUTO: 1 %
ERYTHROCYTE [DISTWIDTH] IN BLOOD BY AUTOMATED COUNT: 14.1 % (ref 10–15)
GFR SERPL CREATININE-BSD FRML MDRD: 57 ML/MIN/1.73M2
GLUCOSE BLD-MCNC: 145 MG/DL (ref 70–99)
GLUCOSE UR STRIP-MCNC: NEGATIVE MG/DL
HCT VFR BLD AUTO: 41.8 % (ref 40–53)
HGB BLD-MCNC: 13.9 G/DL (ref 13.3–17.7)
HGB UR QL STRIP: ABNORMAL
HOLD SPECIMEN: NORMAL
HOLD SPECIMEN: NORMAL
IMM GRANULOCYTES # BLD: 0.1 10E3/UL
IMM GRANULOCYTES NFR BLD: 1 %
INR PPP: 1.58 (ref 0.85–1.15)
KETONES UR STRIP-MCNC: NEGATIVE MG/DL
LEUKOCYTE ESTERASE UR QL STRIP: NEGATIVE
LYMPHOCYTES # BLD AUTO: 1.8 10E3/UL (ref 0.8–5.3)
LYMPHOCYTES NFR BLD AUTO: 16 %
MCH RBC QN AUTO: 28.5 PG (ref 26.5–33)
MCHC RBC AUTO-ENTMCNC: 33.3 G/DL (ref 31.5–36.5)
MCV RBC AUTO: 86 FL (ref 78–100)
MONOCYTES # BLD AUTO: 0.9 10E3/UL (ref 0–1.3)
MONOCYTES NFR BLD AUTO: 8 %
NEUTROPHILS # BLD AUTO: 8.3 10E3/UL (ref 1.6–8.3)
NEUTROPHILS NFR BLD AUTO: 74 %
NITRATE UR QL: NEGATIVE
NRBC # BLD AUTO: 0 10E3/UL
NRBC BLD AUTO-RTO: 0 /100
PH UR STRIP: 7.5 [PH] (ref 5–7)
PLATELET # BLD AUTO: 270 10E3/UL (ref 150–450)
POTASSIUM BLD-SCNC: 3.6 MMOL/L (ref 3.4–5.3)
PROT SERPL-MCNC: 7.6 G/DL (ref 6.8–8.8)
RBC # BLD AUTO: 4.87 10E6/UL (ref 4.4–5.9)
RBC URINE: >182 /HPF
SARS-COV-2 RNA RESP QL NAA+PROBE: NEGATIVE
SODIUM SERPL-SCNC: 137 MMOL/L (ref 133–144)
SP GR UR STRIP: 1.02 (ref 1–1.03)
SPECIMEN EXPIRATION DATE: NORMAL
UROBILINOGEN UR STRIP-MCNC: NORMAL MG/DL
WBC # BLD AUTO: 11.2 10E3/UL (ref 4–11)
WBC URINE: 0 /HPF

## 2022-02-11 PROCEDURE — 710N000009 HC RECOVERY PHASE 1, LEVEL 1, PER MIN: Performed by: UROLOGY

## 2022-02-11 PROCEDURE — 999N000157 HC STATISTIC RCP TIME EA 10 MIN

## 2022-02-11 PROCEDURE — 81001 URINALYSIS AUTO W/SCOPE: CPT | Performed by: EMERGENCY MEDICINE

## 2022-02-11 PROCEDURE — 250N000011 HC RX IP 250 OP 636: Performed by: EMERGENCY MEDICINE

## 2022-02-11 PROCEDURE — 99285 EMERGENCY DEPT VISIT HI MDM: CPT | Mod: 25

## 2022-02-11 PROCEDURE — 82947 ASSAY GLUCOSE BLOOD QUANT: CPT | Performed by: EMERGENCY MEDICINE

## 2022-02-11 PROCEDURE — 85610 PROTHROMBIN TIME: CPT | Performed by: EMERGENCY MEDICINE

## 2022-02-11 PROCEDURE — 76857 US EXAM PELVIC LIMITED: CPT

## 2022-02-11 PROCEDURE — 250N000013 HC RX MED GY IP 250 OP 250 PS 637: Performed by: INTERNAL MEDICINE

## 2022-02-11 PROCEDURE — 3E1K78Z IRRIGATION OF GENITOURINARY TRACT USING IRRIGATING SUBSTANCE, VIA NATURAL OR ARTIFICIAL OPENING: ICD-10-PCS | Performed by: UROLOGY

## 2022-02-11 PROCEDURE — 36415 COLL VENOUS BLD VENIPUNCTURE: CPT | Performed by: EMERGENCY MEDICINE

## 2022-02-11 PROCEDURE — 370N000017 HC ANESTHESIA TECHNICAL FEE, PER MIN: Performed by: UROLOGY

## 2022-02-11 PROCEDURE — 51798 US URINE CAPACITY MEASURE: CPT

## 2022-02-11 PROCEDURE — 258N000003 HC RX IP 258 OP 636: Performed by: ANESTHESIOLOGY

## 2022-02-11 PROCEDURE — 51702 INSERT TEMP BLADDER CATH: CPT

## 2022-02-11 PROCEDURE — 360N000075 HC SURGERY LEVEL 2, PER MIN: Performed by: UROLOGY

## 2022-02-11 PROCEDURE — C9803 HOPD COVID-19 SPEC COLLECT: HCPCS

## 2022-02-11 PROCEDURE — 258N000003 HC RX IP 258 OP 636: Performed by: INTERNAL MEDICINE

## 2022-02-11 PROCEDURE — 94660 CPAP INITIATION&MGMT: CPT

## 2022-02-11 PROCEDURE — 87635 SARS-COV-2 COVID-19 AMP PRB: CPT | Performed by: EMERGENCY MEDICINE

## 2022-02-11 PROCEDURE — 250N000009 HC RX 250: Performed by: NURSE ANESTHETIST, CERTIFIED REGISTERED

## 2022-02-11 PROCEDURE — 272N000001 HC OR GENERAL SUPPLY STERILE: Performed by: UROLOGY

## 2022-02-11 PROCEDURE — 999N000141 HC STATISTIC PRE-PROCEDURE NURSING ASSESSMENT: Performed by: UROLOGY

## 2022-02-11 PROCEDURE — 99221 1ST HOSP IP/OBS SF/LOW 40: CPT | Mod: 25 | Performed by: UROLOGY

## 2022-02-11 PROCEDURE — 52001 CYSTO W/IRRG&EVAC MLT CLOTS: CPT | Mod: 59 | Performed by: UROLOGY

## 2022-02-11 PROCEDURE — 250N000009 HC RX 250: Performed by: UROLOGY

## 2022-02-11 PROCEDURE — 250N000009 HC RX 250

## 2022-02-11 PROCEDURE — 250N000011 HC RX IP 250 OP 636: Performed by: UROLOGY

## 2022-02-11 PROCEDURE — 250N000011 HC RX IP 250 OP 636: Performed by: INTERNAL MEDICINE

## 2022-02-11 PROCEDURE — 85025 COMPLETE CBC W/AUTO DIFF WBC: CPT | Performed by: EMERGENCY MEDICINE

## 2022-02-11 PROCEDURE — 250N000011 HC RX IP 250 OP 636: Performed by: NURSE ANESTHETIST, CERTIFIED REGISTERED

## 2022-02-11 PROCEDURE — 96374 THER/PROPH/DIAG INJ IV PUSH: CPT

## 2022-02-11 PROCEDURE — 86901 BLOOD TYPING SEROLOGIC RH(D): CPT | Performed by: EMERGENCY MEDICINE

## 2022-02-11 PROCEDURE — 258N000003 HC RX IP 258 OP 636: Performed by: EMERGENCY MEDICINE

## 2022-02-11 PROCEDURE — 120N000004 HC R&B MS OVERFLOW

## 2022-02-11 PROCEDURE — 96376 TX/PRO/DX INJ SAME DRUG ADON: CPT

## 2022-02-11 PROCEDURE — 99223 1ST HOSP IP/OBS HIGH 75: CPT | Mod: AI | Performed by: INTERNAL MEDICINE

## 2022-02-11 PROCEDURE — 96375 TX/PRO/DX INJ NEW DRUG ADDON: CPT

## 2022-02-11 PROCEDURE — 250N000011 HC RX IP 250 OP 636: Performed by: ANESTHESIOLOGY

## 2022-02-11 PROCEDURE — 0W3R8ZZ CONTROL BLEEDING IN GENITOURINARY TRACT, VIA NATURAL OR ARTIFICIAL OPENING ENDOSCOPIC: ICD-10-PCS | Performed by: UROLOGY

## 2022-02-11 RX ORDER — NALOXONE HYDROCHLORIDE 0.4 MG/ML
0.4 INJECTION, SOLUTION INTRAMUSCULAR; INTRAVENOUS; SUBCUTANEOUS
Status: DISCONTINUED | OUTPATIENT
Start: 2022-02-11 | End: 2022-02-14 | Stop reason: HOSPADM

## 2022-02-11 RX ORDER — MEPERIDINE HYDROCHLORIDE 25 MG/ML
12.5 INJECTION INTRAMUSCULAR; INTRAVENOUS; SUBCUTANEOUS
Status: DISCONTINUED | OUTPATIENT
Start: 2022-02-11 | End: 2022-02-11 | Stop reason: HOSPADM

## 2022-02-11 RX ORDER — ONDANSETRON 4 MG/1
4 TABLET, ORALLY DISINTEGRATING ORAL EVERY 30 MIN PRN
Status: DISCONTINUED | OUTPATIENT
Start: 2022-02-11 | End: 2022-02-11 | Stop reason: HOSPADM

## 2022-02-11 RX ORDER — AMLODIPINE BESYLATE 2.5 MG/1
2.5 TABLET ORAL DAILY
Status: DISCONTINUED | OUTPATIENT
Start: 2022-02-12 | End: 2022-02-14 | Stop reason: HOSPADM

## 2022-02-11 RX ORDER — LIDOCAINE HYDROCHLORIDE 20 MG/ML
JELLY TOPICAL
Status: COMPLETED
Start: 2022-02-11 | End: 2022-02-11

## 2022-02-11 RX ORDER — ATORVASTATIN CALCIUM 20 MG/1
20 TABLET, FILM COATED ORAL DAILY
Status: DISCONTINUED | OUTPATIENT
Start: 2022-02-12 | End: 2022-02-14 | Stop reason: HOSPADM

## 2022-02-11 RX ORDER — TAMSULOSIN HYDROCHLORIDE 0.4 MG/1
0.4 CAPSULE ORAL DAILY
Status: DISCONTINUED | OUTPATIENT
Start: 2022-02-12 | End: 2022-02-14 | Stop reason: HOSPADM

## 2022-02-11 RX ORDER — ACETAMINOPHEN 650 MG/1
650 SUPPOSITORY RECTAL EVERY 4 HOURS PRN
Status: DISCONTINUED | OUTPATIENT
Start: 2022-02-11 | End: 2022-02-14 | Stop reason: HOSPADM

## 2022-02-11 RX ORDER — HYDROMORPHONE HCL IN WATER/PF 6 MG/30 ML
0.2 PATIENT CONTROLLED ANALGESIA SYRINGE INTRAVENOUS EVERY 5 MIN PRN
Status: CANCELLED | OUTPATIENT
Start: 2022-02-11

## 2022-02-11 RX ORDER — ONDANSETRON 2 MG/ML
4 INJECTION INTRAMUSCULAR; INTRAVENOUS EVERY 30 MIN PRN
Status: DISCONTINUED | OUTPATIENT
Start: 2022-02-11 | End: 2022-02-14 | Stop reason: HOSPADM

## 2022-02-11 RX ORDER — FENTANYL CITRATE 50 UG/ML
50 INJECTION, SOLUTION INTRAMUSCULAR; INTRAVENOUS ONCE
Status: COMPLETED | OUTPATIENT
Start: 2022-02-11 | End: 2022-02-11

## 2022-02-11 RX ORDER — HYDROMORPHONE HYDROCHLORIDE 1 MG/ML
0.5 INJECTION, SOLUTION INTRAMUSCULAR; INTRAVENOUS; SUBCUTANEOUS ONCE
Status: COMPLETED | OUTPATIENT
Start: 2022-02-11 | End: 2022-02-11

## 2022-02-11 RX ORDER — LOSARTAN POTASSIUM 100 MG/1
100 TABLET ORAL DAILY
Status: DISCONTINUED | OUTPATIENT
Start: 2022-02-12 | End: 2022-02-14 | Stop reason: HOSPADM

## 2022-02-11 RX ORDER — FENTANYL CITRATE 50 UG/ML
INJECTION, SOLUTION INTRAMUSCULAR; INTRAVENOUS PRN
Status: DISCONTINUED | OUTPATIENT
Start: 2022-02-11 | End: 2022-02-11

## 2022-02-11 RX ORDER — ACETAMINOPHEN 325 MG/1
650 TABLET ORAL EVERY 4 HOURS PRN
Status: DISCONTINUED | OUTPATIENT
Start: 2022-02-11 | End: 2022-02-14 | Stop reason: HOSPADM

## 2022-02-11 RX ORDER — HYDROMORPHONE HYDROCHLORIDE 1 MG/ML
1 INJECTION, SOLUTION INTRAMUSCULAR; INTRAVENOUS; SUBCUTANEOUS ONCE
Status: COMPLETED | OUTPATIENT
Start: 2022-02-11 | End: 2022-02-11

## 2022-02-11 RX ORDER — HYDROMORPHONE HYDROCHLORIDE 1 MG/ML
1 INJECTION, SOLUTION INTRAMUSCULAR; INTRAVENOUS; SUBCUTANEOUS ONCE
Status: DISCONTINUED | OUTPATIENT
Start: 2022-02-11 | End: 2022-02-11

## 2022-02-11 RX ORDER — ONDANSETRON 2 MG/ML
4 INJECTION INTRAMUSCULAR; INTRAVENOUS EVERY 30 MIN PRN
Status: DISCONTINUED | OUTPATIENT
Start: 2022-02-11 | End: 2022-02-11 | Stop reason: HOSPADM

## 2022-02-11 RX ORDER — LIDOCAINE HYDROCHLORIDE 10 MG/ML
INJECTION, SOLUTION INFILTRATION; PERINEURAL PRN
Status: DISCONTINUED | OUTPATIENT
Start: 2022-02-11 | End: 2022-02-11

## 2022-02-11 RX ORDER — CEFAZOLIN SODIUM 2 G/100ML
2 INJECTION, SOLUTION INTRAVENOUS
Status: COMPLETED | OUTPATIENT
Start: 2022-02-11 | End: 2022-02-11

## 2022-02-11 RX ORDER — ONDANSETRON 2 MG/ML
INJECTION INTRAMUSCULAR; INTRAVENOUS PRN
Status: DISCONTINUED | OUTPATIENT
Start: 2022-02-11 | End: 2022-02-11

## 2022-02-11 RX ORDER — FENTANYL CITRATE 50 UG/ML
25 INJECTION, SOLUTION INTRAMUSCULAR; INTRAVENOUS EVERY 5 MIN PRN
Status: CANCELLED | OUTPATIENT
Start: 2022-02-11

## 2022-02-11 RX ORDER — FINASTERIDE 5 MG/1
5 TABLET, FILM COATED ORAL DAILY
Status: DISCONTINUED | OUTPATIENT
Start: 2022-02-12 | End: 2022-02-14 | Stop reason: HOSPADM

## 2022-02-11 RX ORDER — HYDROMORPHONE HCL IN WATER/PF 6 MG/30 ML
.3-.5 PATIENT CONTROLLED ANALGESIA SYRINGE INTRAVENOUS
Status: DISCONTINUED | OUTPATIENT
Start: 2022-02-11 | End: 2022-02-14 | Stop reason: HOSPADM

## 2022-02-11 RX ORDER — HYDRALAZINE HYDROCHLORIDE 20 MG/ML
10 INJECTION INTRAMUSCULAR; INTRAVENOUS EVERY 4 HOURS PRN
Status: DISCONTINUED | OUTPATIENT
Start: 2022-02-11 | End: 2022-02-14 | Stop reason: HOSPADM

## 2022-02-11 RX ORDER — SODIUM CHLORIDE, SODIUM LACTATE, POTASSIUM CHLORIDE, CALCIUM CHLORIDE 600; 310; 30; 20 MG/100ML; MG/100ML; MG/100ML; MG/100ML
INJECTION, SOLUTION INTRAVENOUS CONTINUOUS
Status: DISCONTINUED | OUTPATIENT
Start: 2022-02-11 | End: 2022-02-12

## 2022-02-11 RX ORDER — OXYCODONE HYDROCHLORIDE 5 MG/1
5 TABLET ORAL EVERY 4 HOURS PRN
Status: DISCONTINUED | OUTPATIENT
Start: 2022-02-11 | End: 2022-02-14 | Stop reason: HOSPADM

## 2022-02-11 RX ORDER — PROPOFOL 10 MG/ML
INJECTION, EMULSION INTRAVENOUS PRN
Status: DISCONTINUED | OUTPATIENT
Start: 2022-02-11 | End: 2022-02-11

## 2022-02-11 RX ORDER — LIDOCAINE 40 MG/G
CREAM TOPICAL
Status: DISCONTINUED | OUTPATIENT
Start: 2022-02-11 | End: 2022-02-14 | Stop reason: HOSPADM

## 2022-02-11 RX ORDER — OXYCODONE HYDROCHLORIDE 5 MG/1
5 TABLET ORAL EVERY 4 HOURS PRN
Status: DISCONTINUED | OUTPATIENT
Start: 2022-02-11 | End: 2022-02-11 | Stop reason: HOSPADM

## 2022-02-11 RX ORDER — FENTANYL CITRATE 50 UG/ML
25 INJECTION, SOLUTION INTRAMUSCULAR; INTRAVENOUS
Status: DISCONTINUED | OUTPATIENT
Start: 2022-02-11 | End: 2022-02-11 | Stop reason: HOSPADM

## 2022-02-11 RX ORDER — NALOXONE HYDROCHLORIDE 0.4 MG/ML
0.2 INJECTION, SOLUTION INTRAMUSCULAR; INTRAVENOUS; SUBCUTANEOUS
Status: DISCONTINUED | OUTPATIENT
Start: 2022-02-11 | End: 2022-02-14 | Stop reason: HOSPADM

## 2022-02-11 RX ORDER — SODIUM CHLORIDE, SODIUM LACTATE, POTASSIUM CHLORIDE, CALCIUM CHLORIDE 600; 310; 30; 20 MG/100ML; MG/100ML; MG/100ML; MG/100ML
INJECTION, SOLUTION INTRAVENOUS CONTINUOUS
Status: DISCONTINUED | OUTPATIENT
Start: 2022-02-11 | End: 2022-02-11 | Stop reason: HOSPADM

## 2022-02-11 RX ORDER — CEFAZOLIN SODIUM 2 G/100ML
2 INJECTION, SOLUTION INTRAVENOUS SEE ADMIN INSTRUCTIONS
Status: DISCONTINUED | OUTPATIENT
Start: 2022-02-11 | End: 2022-02-14

## 2022-02-11 RX ORDER — HYDROMORPHONE HYDROCHLORIDE 1 MG/ML
0.5 INJECTION, SOLUTION INTRAMUSCULAR; INTRAVENOUS; SUBCUTANEOUS
Status: DISCONTINUED | OUTPATIENT
Start: 2022-02-11 | End: 2022-02-11

## 2022-02-11 RX ORDER — METOPROLOL SUCCINATE 25 MG/1
25 TABLET, EXTENDED RELEASE ORAL DAILY
Status: DISCONTINUED | OUTPATIENT
Start: 2022-02-12 | End: 2022-02-14 | Stop reason: HOSPADM

## 2022-02-11 RX ADMIN — HYDROMORPHONE HYDROCHLORIDE 0.5 MG: 1 INJECTION, SOLUTION INTRAMUSCULAR; INTRAVENOUS; SUBCUTANEOUS at 13:58

## 2022-02-11 RX ADMIN — LIDOCAINE HYDROCHLORIDE: 20 JELLY TOPICAL at 11:38

## 2022-02-11 RX ADMIN — SODIUM CHLORIDE 1000 ML: 9 INJECTION, SOLUTION INTRAVENOUS at 12:05

## 2022-02-11 RX ADMIN — FENTANYL CITRATE 50 MCG: 50 INJECTION, SOLUTION INTRAMUSCULAR; INTRAVENOUS at 15:42

## 2022-02-11 RX ADMIN — HYDROMORPHONE HYDROCHLORIDE 1 MG: 1 INJECTION, SOLUTION INTRAMUSCULAR; INTRAVENOUS; SUBCUTANEOUS at 12:16

## 2022-02-11 RX ADMIN — TRAMADOL HYDROCHLORIDE 25 MG: 50 TABLET ORAL at 19:59

## 2022-02-11 RX ADMIN — HYDROMORPHONE HYDROCHLORIDE 0.5 MG: 1 INJECTION, SOLUTION INTRAMUSCULAR; INTRAVENOUS; SUBCUTANEOUS at 12:31

## 2022-02-11 RX ADMIN — CEFAZOLIN SODIUM 2 G: 2 INJECTION, SOLUTION INTRAVENOUS at 15:34

## 2022-02-11 RX ADMIN — HYDROMORPHONE HYDROCHLORIDE 0.5 MG: 0.2 INJECTION, SOLUTION INTRAMUSCULAR; INTRAVENOUS; SUBCUTANEOUS at 23:26

## 2022-02-11 RX ADMIN — SODIUM CHLORIDE, POTASSIUM CHLORIDE, SODIUM LACTATE AND CALCIUM CHLORIDE: 600; 310; 30; 20 INJECTION, SOLUTION INTRAVENOUS at 15:34

## 2022-02-11 RX ADMIN — OXYCODONE HYDROCHLORIDE 5 MG: 5 TABLET ORAL at 21:25

## 2022-02-11 RX ADMIN — HYDROMORPHONE HYDROCHLORIDE 0.5 MG: 1 INJECTION, SOLUTION INTRAMUSCULAR; INTRAVENOUS; SUBCUTANEOUS at 13:31

## 2022-02-11 RX ADMIN — FENTANYL CITRATE 50 MCG: 0.05 INJECTION, SOLUTION INTRAMUSCULAR; INTRAVENOUS at 14:44

## 2022-02-11 RX ADMIN — PROPOFOL 170 MG: 10 INJECTION, EMULSION INTRAVENOUS at 15:42

## 2022-02-11 RX ADMIN — ACETAMINOPHEN 650 MG: 325 TABLET, FILM COATED ORAL at 23:25

## 2022-02-11 RX ADMIN — LIDOCAINE HYDROCHLORIDE 30 MG: 10 INJECTION, SOLUTION INFILTRATION; PERINEURAL at 15:42

## 2022-02-11 RX ADMIN — SODIUM CHLORIDE, POTASSIUM CHLORIDE, SODIUM LACTATE AND CALCIUM CHLORIDE: 600; 310; 30; 20 INJECTION, SOLUTION INTRAVENOUS at 19:59

## 2022-02-11 RX ADMIN — FENTANYL CITRATE 50 MCG: 0.05 INJECTION, SOLUTION INTRAMUSCULAR; INTRAVENOUS at 14:54

## 2022-02-11 RX ADMIN — ONDANSETRON HYDROCHLORIDE 4 MG: 2 INJECTION, SOLUTION INTRAVENOUS at 16:16

## 2022-02-11 RX ADMIN — ONDANSETRON 4 MG: 2 INJECTION INTRAMUSCULAR; INTRAVENOUS at 12:06

## 2022-02-11 ASSESSMENT — ACTIVITIES OF DAILY LIVING (ADL)
ADLS_ACUITY_SCORE: 12

## 2022-02-11 ASSESSMENT — ENCOUNTER SYMPTOMS
VOMITING: 1
ABDOMINAL PAIN: 1
NAUSEA: 1
DIFFICULTY URINATING: 1

## 2022-02-11 ASSESSMENT — MIFFLIN-ST. JEOR: SCORE: 1617.57

## 2022-02-11 NOTE — ANESTHESIA CARE TRANSFER NOTE
Patient: Celia Stallings    Procedure: Procedure(s):  CYSTOSCOPY, EVACUATION OF BLADDER HEMATOMA, FULGURATION OF THE BLADDER       Diagnosis: Gross hematuria [R31.0]  Diagnosis Additional Information: No value filed.    Anesthesia Type:   General     Note:    Oropharynx: oral airway in place  Level of Consciousness: drowsy  Oxygen Supplementation: face mask  Level of Supplemental Oxygen (L/min / FiO2): 6  Independent Airway: airway patency satisfactory and stable  Dentition: dentition unchanged  Vital Signs Stable: post-procedure vital signs reviewed and stable  Report to RN Given: handoff report given  Patient transferred to: PACU    Handoff Report: Identifed the Patient, Identified the Reponsible Provider, Reviewed the pertinent medical history, Discussed the surgical course, Reviewed Intra-OP anesthesia mangement and issues during anesthesia, Set expectations for post-procedure period and Allowed opportunity for questions and acknowledgement of understanding      Vitals:  Vitals Value Taken Time   /60 02/11/22 1626   Temp     Pulse 69 02/11/22 1629   Resp 15 02/11/22 1629   SpO2 100 % 02/11/22 1629   Vitals shown include unvalidated device data.    Electronically Signed By: RAISSA Pretty CRNA  February 11, 2022  4:30 PM
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
## 2022-02-11 NOTE — CONSULTS
Consult Date: 02/11/2022    EMERGENCY DEPARTMENT CONSULTATION     REASON FOR CONSULTATION:  Hematuria with clot retention.    HISTORY OF PRESENT ILLNESS:  Celia Albert is a 76-year-old gentleman with a prosthetic heart valve, who normally takes Coumadin.  On Tuesday of this week he underwent a photovaporization of the prostate at [facility].  On the next day, on Wednesday, he removed his Mohan catheter and he said he was voiding just fine.  Yesterday, on Thursday, he restarted his Lovenox as well as Coumadin and then developed hematuria.  Through the night last night he became unable to urinate completely.  He came to the Emergency Department here at the [facility] and the Emergency Department staff have tried 4 times to place a Mohan catheter and irrigate out the bladder.  I spoke with the nurse and she reports that each time the catheter went in quite easily all the way to the hub and initially bloody urine would drain out, but then the catheters would quickly clog.  They tried to continuously irrigate and hand irrigate the catheter multiple times, but they simply could not get the catheters running.  He currently does not have a catheter in.  He is uncomfortable from his inability to urinate.    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    PAST SURGICAL HISTORY:  Recent photovaporization of the prostate as above.  Mechanical heart valve.      ALLERGIES:  LISINOPRIL.    MEDICATIONS:    1.  Coumadin.  2.  Previously on Flomax.    3.  MiraLax.  4.  Toprol.  5.  Cozaar.  6.  Neurontin.  7.  Previously on Proscar.  8.  Voltaren.  9.  Flexeril.  10.  Lipitor.  11.  Klonopin.  12.  Norvasc.  13.  Recently took Lovenox.    SOCIAL HISTORY:  He is a nonsmoker.    FAMILY HISTORY:  No family history of urologic history or stones.    REVIEW OF SYSTEMS:  Positive for the hematuria and abdominal discomfort from urinary  retention only.  No gastrointestinal symptoms.  No recent weight gain or weight loss.  No fevers, chills, nausea or vomiting.  No psychiatric changes.  No musculoskeletal pain or heart palpitations.  No shortness of breath.    PHYSICAL EXAMINATION:    GENERAL:  He was seen in the Emergency Department.  He is in pain from his urinary retention.  HEENT:  Normocephalic, atraumatic.  LUNGS:  Nonlabored breathing.  ABDOMEN:  Soft, nontender.  The suprapubic region is somewhat tender and distended.    IMAGING STUDIES:  He did have an ultrasound of the bladder performed that showed a large 13 x 8 x 9 cm hematoma inside the bladder.    IMPRESSION AND PLAN:  This is a 76-year-old gentleman with a bladder hematoma after photovaporization of the prostate 3 days ago.  I discussed the clinical situation with the patient.  In the Emergency Department here, I could try again to place a large bore catheter and see if I could irrigate out the bladder.  However, I counseled him that I think this would be unsuccessful.  This has already been tried by the Emergency Department staff and he also appears to have a very large hematoma inside the bladder.  I recommended we proceed to the operating room for a cystoscopy, evacuation of bladder hematoma, and fulguration of the prostate and/or bladder as necessary.  We discussed the surgery in detail today along with its risks and expected recovery.  He wished to proceed.  We will proceed to the operating room.  We will hold all anticoagulants until urine is clear.  I advised him he will need to be admitted to the hospital postoperatively with continuous bladder irrigation.    Tonny Lacy MD        D: 2022   T: 2022   MT: [id number]    Name:     JAKE RINCON  MRN:      [id number]        Account:      [id number]   :      1945           Consult Date: 2022     Document: [id number]

## 2022-02-11 NOTE — ED NOTES
3-way grullon catheter placed x 2. One 20 fr, one 22 fr. Irrigation connected right away. Catheter clogged within seconds. Clots noted within catheter and irrigation tubing. Clots also noted to come out of penis upon removal of catheters. Pt has attempted to void since removal, drops of blood with intermittent sprays from attempts. MD aware.

## 2022-02-11 NOTE — ED TRIAGE NOTES
Pt arrives with c/o abdominal pain and urinary retention. Pt had a laser assisted prostate procedure done on Tuesday, was instructed to remove catheter on Wednesday. Pt reports decreased urine output and severe pain since last night. Pt last voided last night. Pt nauseated, vomiting, and diaphoretic in triage. Pt denies chest pain or SOB. Pt continuously going from sitting to standing during blood pressure being cycled. ABCs intact.

## 2022-02-11 NOTE — ANESTHESIA POSTPROCEDURE EVALUATION
Patient: Celia Stallings    Procedure: Procedure(s):  CYSTOSCOPY, EVACUATION OF BLADDER HEMATOMA, FULGURATION OF THE BLADDER       Diagnosis:Gross hematuria [R31.0]  Diagnosis Additional Information: No value filed.    Anesthesia Type:  General    Note:  Disposition: Outpatient   Postop Pain Control: Uneventful            Sign Out: Well controlled pain   PONV: No   Neuro/Psych: Uneventful            Sign Out: Acceptable/Baseline neuro status   Airway/Respiratory: Uneventful            Sign Out: Acceptable/Baseline resp. status   CV/Hemodynamics: Uneventful            Sign Out: Acceptable CV status; No obvious hypovolemia; No obvious fluid overload   Other NRE: NONE   DID A NON-ROUTINE EVENT OCCUR? No           Last vitals:  Vitals Value Taken Time   /78 02/11/22 1715   Temp 98  F (36.7  C) 02/11/22 1626   Pulse 64 02/11/22 1730   Resp 36 02/11/22 1730   SpO2 96 % 02/11/22 1730   Vitals shown include unvalidated device data.    Electronically Signed By: Geovani Thomas MD  February 11, 2022  5:31 PM

## 2022-02-11 NOTE — ANESTHESIA PREPROCEDURE EVALUATION
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 No pertinent surgical history.   No Known Allergies   Social History     Tobacco Use     Smoking status: Not on file     Smokeless tobacco: Not on file   Substance Use Topics     Alcohol use: Not on file      Wt Readings from Last 1 Encounters:   22 90.7 kg (200 lb)        Anesthesia Evaluation   Pt has had prior anesthetic. Type: General.    No history of anesthetic complications       ROS/MED HX  ENT/Pulmonary:     (+) sleep apnea, uses CPAP,     Neurologic:  - neg neurologic ROS     Cardiovascular:     (+) hypertension-----valvular problems/murmurs s/p valve replacement ~25 years ago.     METS/Exercise Tolerance:     Hematologic:  - neg hematologic  ROS     Musculoskeletal:  - neg musculoskeletal ROS     GI/Hepatic:  - neg GI/hepatic ROS     Renal/Genitourinary:     (+) renal disease, Nephrolithiasis ,     Endo:  - neg endo ROS     Psychiatric/Substance Use:       Infectious Disease:  - neg infectious disease ROS     Malignancy:       Other:            Physical Exam    Airway        Mallampati: III   TM distance: > 3 FB   Neck ROM: full   Mouth opening: > 3 cm    Respiratory Devices and Support         Dental           Cardiovascular   cardiovascular exam normal          Pulmonary   pulmonary exam normal                OUTSIDE LABS:  CBC:   Lab Results   Component Value Date    WBC 11.2 (H) 2022    WBC 5.6 2021    HGB 13.9  02/11/2022    HGB 12.2 (L) 11/29/2021    HCT 41.8 02/11/2022    HCT 37.9 (L) 11/29/2021     02/11/2022     11/29/2021     BMP:   Lab Results   Component Value Date     02/11/2022     11/12/2021    POTASSIUM 3.6 02/11/2022    POTASSIUM 4.3 11/12/2021    CHLORIDE 105 02/11/2022    CHLORIDE 107 11/12/2021    CO2 25 02/11/2022    CO2 29 11/12/2021    BUN 20 02/11/2022    BUN 16 11/12/2021    CR 1.28 (H) 02/11/2022    CR 0.75 11/29/2021     (H) 02/11/2022     (H) 11/12/2021     COAGS:   Lab Results   Component Value Date    INR 1.58 (H) 02/11/2022     POC: No results found for: BGM, HCG, HCGS  HEPATIC:   Lab Results   Component Value Date    ALBUMIN 3.8 02/11/2022    PROTTOTAL 7.6 02/11/2022    ALT 62 02/11/2022    AST 35 02/11/2022    ALKPHOS 75 02/11/2022    BILITOTAL 0.5 02/11/2022     OTHER:   Lab Results   Component Value Date    RICKI 10.4 (H) 02/11/2022    CRP 0.1 11/29/2021    SED 15 11/29/2021       Anesthesia Plan    ASA Status:  3      Anesthesia Type: General.     - Airway: LMA   Induction: Intravenous.   Maintenance: Balanced.        Consents    Anesthesia Plan(s) and associated risks, benefits, and realistic alternatives discussed. Questions answered and patient/representative(s) expressed understanding.    - Discussed:     - Discussed with:  Patient      - Extended Intubation/Ventilatory Support Discussed: No.      - Patient is DNR/DNI Status: No    Use of blood products discussed: No .     Postoperative Care    Pain management: Oral pain medications, IV analgesics.   PONV prophylaxis: Ondansetron (or other 5HT-3), Dexamethasone or Solumedrol     Comments:                Geovani Thomas MD

## 2022-02-11 NOTE — ED NOTES
ERT roomed patient, advised writer RN that pt reported some bleeding from penis that started last night. Pt had paper towel in pants that had some blood on it.

## 2022-02-12 LAB
ANION GAP SERPL CALCULATED.3IONS-SCNC: 2 MMOL/L (ref 3–14)
APTT PPP: 47 SECONDS (ref 22–38)
BUN SERPL-MCNC: 17 MG/DL (ref 7–30)
CALCIUM SERPL-MCNC: 8.8 MG/DL (ref 8.5–10.1)
CHLORIDE BLD-SCNC: 109 MMOL/L (ref 94–109)
CO2 SERPL-SCNC: 28 MMOL/L (ref 20–32)
CREAT SERPL-MCNC: 0.93 MG/DL (ref 0.66–1.25)
ERYTHROCYTE [DISTWIDTH] IN BLOOD BY AUTOMATED COUNT: 14.4 % (ref 10–15)
GFR SERPL CREATININE-BSD FRML MDRD: 84 ML/MIN/1.73M2
GLUCOSE BLD-MCNC: 108 MG/DL (ref 70–99)
HCT VFR BLD AUTO: 30.9 % (ref 40–53)
HGB BLD-MCNC: 10 G/DL (ref 13.3–17.7)
INR PPP: 2.48 (ref 0.85–1.15)
MCH RBC QN AUTO: 28.9 PG (ref 26.5–33)
MCHC RBC AUTO-ENTMCNC: 32.4 G/DL (ref 31.5–36.5)
MCV RBC AUTO: 89 FL (ref 78–100)
PLATELET # BLD AUTO: 196 10E3/UL (ref 150–450)
POTASSIUM BLD-SCNC: 4.4 MMOL/L (ref 3.4–5.3)
RBC # BLD AUTO: 3.46 10E6/UL (ref 4.4–5.9)
SODIUM SERPL-SCNC: 139 MMOL/L (ref 133–144)
WBC # BLD AUTO: 9.1 10E3/UL (ref 4–11)

## 2022-02-12 PROCEDURE — 999N000105 HC STATISTIC NO DOCUMENTATION TO SUPPORT CHARGE

## 2022-02-12 PROCEDURE — 94660 CPAP INITIATION&MGMT: CPT

## 2022-02-12 PROCEDURE — 85027 COMPLETE CBC AUTOMATED: CPT | Performed by: INTERNAL MEDICINE

## 2022-02-12 PROCEDURE — 250N000013 HC RX MED GY IP 250 OP 250 PS 637: Performed by: INTERNAL MEDICINE

## 2022-02-12 PROCEDURE — 52214 CYSTOSCOPY AND TREATMENT: CPT | Performed by: UROLOGY

## 2022-02-12 PROCEDURE — 258N000003 HC RX IP 258 OP 636: Performed by: INTERNAL MEDICINE

## 2022-02-12 PROCEDURE — 120N000004 HC R&B MS OVERFLOW

## 2022-02-12 PROCEDURE — 85610 PROTHROMBIN TIME: CPT | Performed by: INTERNAL MEDICINE

## 2022-02-12 PROCEDURE — 99232 SBSQ HOSP IP/OBS MODERATE 35: CPT | Performed by: INTERNAL MEDICINE

## 2022-02-12 PROCEDURE — 36415 COLL VENOUS BLD VENIPUNCTURE: CPT | Performed by: UROLOGY

## 2022-02-12 PROCEDURE — 85730 THROMBOPLASTIN TIME PARTIAL: CPT | Performed by: UROLOGY

## 2022-02-12 PROCEDURE — 99231 SBSQ HOSP IP/OBS SF/LOW 25: CPT | Mod: 25 | Performed by: UROLOGY

## 2022-02-12 PROCEDURE — 82310 ASSAY OF CALCIUM: CPT | Performed by: INTERNAL MEDICINE

## 2022-02-12 RX ORDER — WARFARIN SODIUM 5 MG/1
5 TABLET ORAL DAILY
Status: DISCONTINUED | OUTPATIENT
Start: 2022-02-12 | End: 2022-02-13

## 2022-02-12 RX ADMIN — LOSARTAN POTASSIUM 100 MG: 100 TABLET, FILM COATED ORAL at 07:57

## 2022-02-12 RX ADMIN — AMLODIPINE BESYLATE 2.5 MG: 2.5 TABLET ORAL at 07:57

## 2022-02-12 RX ADMIN — ATORVASTATIN CALCIUM 20 MG: 20 TABLET, FILM COATED ORAL at 07:57

## 2022-02-12 RX ADMIN — ACETAMINOPHEN 650 MG: 325 TABLET, FILM COATED ORAL at 17:46

## 2022-02-12 RX ADMIN — SODIUM CHLORIDE, POTASSIUM CHLORIDE, SODIUM LACTATE AND CALCIUM CHLORIDE: 600; 310; 30; 20 INJECTION, SOLUTION INTRAVENOUS at 08:40

## 2022-02-12 RX ADMIN — ACETAMINOPHEN 650 MG: 325 TABLET, FILM COATED ORAL at 12:55

## 2022-02-12 RX ADMIN — FINASTERIDE 5 MG: 5 TABLET, FILM COATED ORAL at 07:57

## 2022-02-12 RX ADMIN — ACETAMINOPHEN 650 MG: 325 TABLET, FILM COATED ORAL at 08:00

## 2022-02-12 RX ADMIN — ACETAMINOPHEN 650 MG: 325 TABLET, FILM COATED ORAL at 21:12

## 2022-02-12 RX ADMIN — OXYCODONE HYDROCHLORIDE 5 MG: 5 TABLET ORAL at 20:12

## 2022-02-12 RX ADMIN — WARFARIN SODIUM 5 MG: 5 TABLET ORAL at 12:01

## 2022-02-12 RX ADMIN — TAMSULOSIN HYDROCHLORIDE 0.4 MG: 0.4 CAPSULE ORAL at 07:57

## 2022-02-12 RX ADMIN — TRAMADOL HYDROCHLORIDE 50 MG: 50 TABLET ORAL at 04:40

## 2022-02-12 RX ADMIN — OXYCODONE HYDROCHLORIDE 5 MG: 5 TABLET ORAL at 02:04

## 2022-02-12 RX ADMIN — TRAMADOL HYDROCHLORIDE 25 MG: 50 TABLET ORAL at 17:46

## 2022-02-12 RX ADMIN — METOPROLOL SUCCINATE 25 MG: 25 TABLET, EXTENDED RELEASE ORAL at 07:57

## 2022-02-12 ASSESSMENT — ACTIVITIES OF DAILY LIVING (ADL)
ADLS_ACUITY_SCORE: 14
CONCENTRATING,_REMEMBERING_OR_MAKING_DECISIONS_DIFFICULTY: NO
ADLS_ACUITY_SCORE: 3
FALL_HISTORY_WITHIN_LAST_SIX_MONTHS: NO
ADLS_ACUITY_SCORE: 12
ADLS_ACUITY_SCORE: 14
DRESSING/BATHING_DIFFICULTY: NO
EQUIPMENT_CURRENTLY_USED_AT_HOME: CANE, STRAIGHT
DIFFICULTY_EATING/SWALLOWING: NO
WALKING_OR_CLIMBING_STAIRS_DIFFICULTY: NO
ADLS_ACUITY_SCORE: 14
ADLS_ACUITY_SCORE: 12
ADLS_ACUITY_SCORE: 3
ADLS_ACUITY_SCORE: 14
ADLS_ACUITY_SCORE: 3
DIFFICULTY_COMMUNICATING: NO
DOING_ERRANDS_INDEPENDENTLY_DIFFICULTY: NO
ADLS_ACUITY_SCORE: 12
HEARING_DIFFICULTY_OR_DEAF: NO
TOILETING_ISSUES: NO
ADLS_ACUITY_SCORE: 14
ADLS_ACUITY_SCORE: 14
WEAR_GLASSES_OR_BLIND: NO
ADLS_ACUITY_SCORE: 12
ADLS_ACUITY_SCORE: 12
ADLS_ACUITY_SCORE: 14

## 2022-02-12 NOTE — PHARMACY-ADMISSION MEDICATION HISTORY
Admission medication history interview status for this patient is complete. See [facility] admission navigator for allergy information, prior to admission medications and immunization status.     Medication history interview done, indicate source(s): Patient  Medication history resources (including written lists, pill bottles, clinic record):None  Pharmacy: -    Changes made to PTA medication list:  Added: lovenox  Changed: -  Reported as Not Taking: -  Removed: ampicillin, flexeril, voltaren, gabapentin, dilaudid, miralax    Actions taken by pharmacist (provider contacted, etc):None     Additional medication history information:Restarted warfarin today after a couple of days off, being bridged with lovenox in prep for procedure today.    Medication reconciliation/reorder completed by provider prior to medication history?  no   (Y/N)     For patients on insulin therapy:   Do you use sliding scale insulin based on blood sugars?   What is your pre-meal insulin coverage?    Do you typically eat three meals a day?   How many times do you check your blood glucose per day?   How many episodes of hypoglycemia do you typically have per month?   Do you have a Continuous Glucose Monitor (CGM)?      Prior to Admission medications    Medication Sig Last Dose Taking? Auth Provider   amLODIPine (NORVASC) 2.5 MG tablet Take 2.5 mg by mouth daily 2/11/2022 at Unknown time Yes Unknown, Entered By History   atorvastatin (LIPITOR) 20 MG tablet Take 20 mg by mouth daily 2/11/2022 at Unknown time Yes Unknown, Entered By History   enoxaparin ANTICOAGULANT (LOVENOX) 100 MG/ML syringe Inject 90 mg Subcutaneous every 12 hours 2/11/2022 at x1 Yes Unknown, Entered By History   finasteride (PROSCAR) 5 MG tablet Take 5 mg by mouth daily 2/11/2022 at Unknown time Yes Unknown, Entered By History   losartan (COZAAR) 100 MG tablet Take 100 mg by mouth daily 2/11/2022 at Unknown time Yes Unknown, Entered By History   metoprolol succinate ER (TOPROL-XL) 25  MG 24 hr tablet Take 25 mg by mouth daily 2/11/2022 at Unknown time Yes Unknown, Entered By History   tamsulosin (FLOMAX) 0.4 MG capsule Take 0.4 mg by mouth daily 2/11/2022 at Unknown time Yes Unknown, Entered By History   warfarin ANTICOAGULANT (COUMADIN) 5 MG tablet Take 5 mg by mouth daily 2/11/2022 at Unknown time Yes Unknown, Entered By History   acetaminophen (TYLENOL) 325 MG tablet Take 2 tablets (650 mg) by mouth every 4 hours as needed for mild pain   Adam Wyatt MD

## 2022-02-12 NOTE — PROGRESS NOTES
A CPAP of  +7 @ 30% was applied to the pt via the mask for LOUISA.  The bridge of the nose good Skin integrity intact. Pt is tolerating it well. Will continue to monitor.     Thao Higgins, RT

## 2022-02-12 NOTE — H&P
[facility]  History and Physical  Hospitalist - Adam Iqbal DO       Date of Admission:  2/11/2022    Chief Complaint   Inability to urinate and bladder pain    History is obtained from the patient as well as the electronic medical record.    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 On 2/8/2022, the patient had a laser vaporization of the prostate secondary to BPH with urinary retention at [facility]. The patient had been bridged to Lovenox from his warfarin prior to the procedure. After the procedure the patient resumed his bridging back to warfarin with Lovenox. On Wednesday 2/9, the patient removed his Mohan catheter and was urinating without issue. On the evening of 2/10, he was unable to urinate and developed bladder discomfort. He called his urologist today and was told to come to the emergency department.    In the emergency department the patient was found to have a temperature of 95.9  F, heart rate 80, blood pressure 176/84, respiratory rate 20, SPO2 100% on room air. Initial lab work revealed a creatinine of 1.28 with a baseline of 0.75, calcium 10.4, WBC 11.2, INR 1.58. Urinalysis revealed 300 protein, negative nitrites, large blood, greater than 182 RBC, zero WBC. The patient was seen by urology in the emergency department. A Mohan catheter was attempted to be placed with irrigation in  the emergency department without success. The patient was taken by urology to the OR for cystoscopy, evacuation of bladder hematoma, and fulguration of the prostate and/or bladder. The patient was seen in the PACU post procedure.    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  Likely able to restart in AM  - Hydralazine prn    Chronic Medical Problems:  Dilated Thoracic Aorta - follows with [facility]  Hx of Infective Endocarditis of Mechanical MV in 11/2021  Dyslipidemia  Gout    PLAN: Resume home medications as appropriate once confirmed by pharmacy.     DVT Prophylaxis: Pneumatic Compression Devices  Code Status: Full Code  Discharge Plan: Expected Discharge: 1-2 days  Anticipated discharge location: Home    Adam Iqbal DO    Primary Care Physician   ALIX SONG    -----------------------------------------------------------------------------------------------------------------------------------------------------------------------------------------------------    Past Medical History    I have reviewed this patient's medical history and updated it with pertinent information  if needed.     Sleep apnea     Elevated prostate specific antigen (PSA)     Osteoarthritis     Essential hypertension (HRC)     Obesity (HRC)     Long term current use of anticoagulant therapy     Chondrocalcinosis     ED (erectile dysfunction)     History of mitral valve replacement with mechanical valve     History of adenomatous polyp of colon     Gout     S/P aortic valve replacement with metallic valve (HRC)     Benign prostatic hyperplasia     IFG (impaired fasting glucose)     Dyslipidemia (HRC)     LVH (left ventricular hypertrophy)     Bladder trabeculation     Urinary retention     Incomplete bladder emptying     Ascending aortic aneurysm (HRC)     S/P ablation of atrial flutter     Past Surgical History   I have reviewed this patient's surgical history and updated it with pertinent information if needed.    ABLATION OF DYSRHYTHMIC FOCUS     AORTIC VALVE REPLACEMENT 1997   [employer]tronic Peralta, 27mm [id number]     MITRAL VALVE REPLACEMENT 1999   acute rupture; [address]     PENIS SURGERY     URETEROSCOPY Left 11/22/2017     VASECTOMY       Prior to Admission Medications   Prior to Admission Medications   Prescriptions Last Dose Informant Patient Reported? Taking?   HYDROmorphone (DILAUDID) 2 MG tablet   No No   Sig: Take 1 tablet (2 mg) by mouth 4 times daily as needed for moderate to severe pain   acetaminophen (TYLENOL) 325 MG tablet   No No   Sig: Take 2 tablets (650 mg) by mouth every 4 hours as needed for mild pain   acetaminophen (TYLENOL) 500 MG tablet   No No   Sig: Take 2 tablets (1,000 mg) by mouth 3 times daily   amLODIPine (NORVASC) 2.5 MG tablet   Yes No   Sig: Take 2.5 mg by mouth daily   ampicillin (OMNIPEN) 2 g vial   No No   Sig: Inject 2 g into the vein every 4 hours , qMonday CBC,ESR,CRP, creatinine and ALT while on this medication   atorvastatin (LIPITOR) 20 MG tablet   Yes No   Sig: Take 20 mg by mouth daily   cyclobenzaprine (FLEXERIL) 5 MG tablet   No No   Sig: Take 1 tablet (5 mg) by  mouth 3 times daily as needed for muscle spasms   diclofenac (VOLTAREN) 1 % topical gel   No No   Sig: Apply 4 g topically 4 times daily as needed for moderate pain Apply to affected joints.   finasteride (PROSCAR) 5 MG tablet   Yes No   Sig: Take 5 mg by mouth daily   gabapentin (NEURONTIN) 100 MG capsule   No No   Sig: Take 1 capsule (100 mg) by mouth 3 times daily   losartan (COZAAR) 100 MG tablet   Yes No   Sig: Take 100 mg by mouth daily   metoprolol succinate ER (TOPROL-XL) 25 MG 24 hr tablet   Yes No   Sig: Take 25 mg by mouth daily   polyethylene glycol (MIRALAX) 17 g packet   No No   Sig: Take 17 g by mouth daily as needed for constipation   tamsulosin (FLOMAX) 0.4 MG capsule   Yes No   Sig: Take 0.4 mg by mouth daily   warfarin ANTICOAGULANT (COUMADIN) 5 MG tablet   Yes No   Sig: Take 5 mg by mouth daily      Facility-Administered Medications: None     Allergies   Allergies   Allergen Reactions     Lisinopril Cough       Social History   I have reviewed this patient's social history and updated it with pertinent information if needed. Celia Stallings       Smoking status: Never Smoker     Smokeless tobacco: Never Used   Substance Use Topics     Alcohol use: No   Comment: Alcoholic Drinks/day: Freq:Never;     Family History   I have reviewed this patient's family history and updated it with pertinent information if needed.     Cancer, Brain Birth Mother     Heart Disease Birth Father     Coronary Artery Disease Brother     -----------------------------------------------------------------------------------------------------------------------------------------------------------------------------------------------------    Review of Systems   The 10 point Review of Systems is negative other than noted in the HPI or here.     Physical Exam   Temp: 98  F (36.7  C) Temp src: Temporal BP: (!) 144/90 Pulse: 70   Resp: 12 SpO2: 100 % O2 Device: None (Room air) Oxygen Delivery: 2 LPM  Vital Signs with Ranges  Temp:   [95.4  F (35.2  C)-98  F (36.7  C)] 98  F (36.7  C)  Pulse:  [64-82] 70  Resp:  [10-36] 12  BP: (120-184)/(60-95) 144/90  SpO2:  [95 %-100 %] 100 %  200 lbs 0 oz    Constitutional: Awake, alert, cooperative, no apparent distress.  Eyes: Conjunctiva and pupils examined and normal.  HEENT: Moist mucous membranes, normal dentition.  Respiratory: Clear to auscultation bilaterally, no crackles or wheezing.  Cardiovascular: Regular rate and rhythm, normal S1 and S2, and no murmur noted.  GI: Soft, non-distended, non-tender, normal bowel sounds.  Lymph/Hematologic: No anterior cervical or supraclavicular adenopathy.  Skin: No rashes, no cyanosis, no edema.  Musculoskeletal: No joint swelling, erythema or tenderness.  Neurologic: Cranial nerves 2-12 intact, normal strength and sensation.  Psychiatric: Alert, oriented to person, place and time, no obvious anxiety or depression.     Data     Recent Labs   Lab 02/11/22  1207   WBC 11.2*   HGB 13.9   MCV 86      INR 1.58*      POTASSIUM 3.6   CHLORIDE 105   CO2 25   BUN 20   CR 1.28*   ANIONGAP 7   RICKI 10.4*   *   ALBUMIN 3.8   PROTTOTAL 7.6   BILITOTAL 0.5   ALKPHOS 75   ALT 62   AST 35       Recent Results (from the past 24 hour(s))   US Bladder Only    Narrative    US BLADDER ONLY 2/11/2022 1:55 PM    CLINICAL HISTORY: Acute urinary retention on anticoagulation, evaluate  for clot in bladder.    TECHNIQUE: Bladder Ultrasound.    COMPARISON: None.    FINDINGS: There is a large mass in the urinary bladder that measures  13.3 x 8.8 x 9.0 cm and demonstrates no internal vascular flow.      Impression    IMPRESSION:  1.  Large avascular mass in the urinary bladder likely represents a  large blood clot.    PALLAVI WILSON MD         SYSTEM ID:  [id number]

## 2022-02-12 NOTE — PROGRESS NOTES
Pt refusing to wear capno. CPAP ordered and requested. Writer shut machine off and will continue to monitor.

## 2022-02-12 NOTE — PROGRESS NOTES
[facility]  Hospitalist Progress Note  Kelly Gary MD 02/12/22    Reason for Stay (Diagnosis): Hematuria         Assessment and Plan:      Summary of Stay: Celia Stallings is a 76 year old male with past medical history of bicuspid aortic valve and mixed valve disease status post AVR with metallic valve, mitral leaflet rupture status post MVR with mechanical mitral valve currently on warfarin, atrial fibrillation status post ablation in 2006, dilated thoracic aorta, hypertension, history of endocarditis in 11/2021 secondary to Enterococcus faecalis bacteremia with mitral valve vegetation, BPH, obesity, dyslipidemia, gout who was admitted on 2/11/2022 with inability to urinate and bladder pain.     On 2/8/2022, the patient had a laser vaporization of the prostate secondary to BPH with urinary retention at [facility]. The patient had been bridged to Lovenox from his warfarin prior to the procedure. After the procedure the patient resumed his bridging back to warfarin with Lovenox. On Wednesday 2/9, the patient removed his Mohan catheter and was urinating without issue. On the evening of 2/10, he was unable to urinate and developed bladder discomfort. He called his urologist and was told to come to the emergency department.    Patient underwent cystoscopy with evacuation of bladder hematoma and fulguration of the bladder on 2/11.  Remains on CBI for hematuria.    Problem List/Assessment and Plan:     Urinary Retention with Hematuria with recent photovaporization of prostate on 2/8 now s/p cystoscopy 2/11: Underwent Photovaporization of Prostate secondary to BPH on 2/8/2022 at [facility] with development of urinary retention prompting this admission.  Taken to OR on 2/11/2022 for Cystoscopy, Evacuation of Bladder Hematoma, and Fulguration of the Bladder.  Remains on CBI, if this is slowed down he develops clots.  He needs to remain on anticoagulation given his underlying mechanical valves,  very high risk for clot if not on anticoagulation.  - Urology following, appreciate recommendations  - Need to continue Coumadin given mechanical MV and high risk for clot  - Continue grullon catheter with CBI     Bicuspid Aortic Valve with Mixed Valve Disease s/p AVR with Metallic Valve in 1999  Mitral Leaflet Rupture s/p MVR with Mechanical MV on Coumadin in 1999  - Follows with [facility]  - Continue Coumadin, currently therapeutic     Acute Kidney Injury: Baseline creatinine ~0.7, on presentation was elevated at 1.28.  This is in the setting of urinary retention, today improved to 0.93  - Daily BMP     Mild Hypercalcemia  - Repeat BMP in AM     Hypertension  - Continue PTA Losartan, Amlodipine and Metoprolol    ABLA: Hgb was 13.9 on admission, decreased to 10 today. This is from hematuria/clots. No indication for transfusion, repeat tomorrow.     Chronic Medical Problems:  Dilated Thoracic Aorta - follows with [facility]  Hx of Infective Endocarditis of Mechanical MV in 11/2021  Dyslipidemia  Gout    Diet: Regular Diet Adult    DVT Prophylaxis: Warfarin  Grullon Catheter: PRESENT, indication: Gross Hematuria  Code Status: Full Code      Disposition Plan   Expected Discharge: 02/14/2022     Anticipated discharge location:  Awaiting care coordination huddle  Delays:on CBI    Entered: Kelly Gary MD 02/12/2022, 8:48 AM       The patient's care was discussed with the Bedside Nurse and Patient.    Hospitalist Service  [facility]          Interval History (Subjective):      Patient was seen with his bedside nurse this morning.  He states that he wants to take Coumadin in the morning as he usually does this at home.  He continues on CBI and when it is slowed down he will get significant bleeding and clots.  With CBI continuously running he has cherry colored urine.  He denies chest pain, shortness of breath, nausea, vomiting, abdominal pain.  He states he wants to go home today.  I  "reviewed the care plan explained why he could not go home given his continued bleeding.  All of his questions were answered.                  Physical Exam:      Last Vital Signs:  /53 (BP Location: Right arm)   Pulse 82   Temp 98.4  F (36.9  C) (Oral)   Resp 16   Ht 1.753 m (5' 9\")   Wt 90.7 kg (200 lb)   SpO2 99%   BMI 29.53 kg/m      General: Alert, awake, no acute distress.  HEENT: Normocephalic and atraumatic, eyes anicteric and without scleral injection, EOMI, face symmetric, MMM.  Cardiac: RRR, normal S1, S2. Clicks present at both mitral and aortic valve, no g/r, no LE edema.  Pulmonary: Normal chest rise, normal work of breathing.  Lungs CTAB without crackles or wheezing.  Abdomen: soft, non-tender, non-distended.  Normoactive bowel sounds, no guarding or rebound tenderness.  : Mohan in place, CBI running with cherry colored urine but no clots  Extremities: no deformities.  Warm, well perfused.  Skin: no rashes or lesions.  Warm and Dry.  Neuro: No focal deficits.  Speech clear.  Coordination and strength grossly normal.  Psych: Alert and oriented x3. Appropriate affect.         Medications:      All current medications were reviewed with changes reflected in problem list.         Data:      All new lab and imaging data was reviewed.   Labs:  Recent Labs   Lab 02/12/22  0602 02/11/22  1207    137   POTASSIUM 4.4 3.6   CHLORIDE 109 105   CO2 28 25   ANIONGAP 2* 7   * 145*   BUN 17 20   CR 0.93 1.28*   GFRESTIMATED 84 57*   RICKI 8.8 10.4*     Recent Labs   Lab 02/12/22  0602 02/11/22  1207   WBC 9.1 11.2*   HGB 10.0* 13.9   HCT 30.9* 41.8   MCV 89 86    270     Recent Labs   Lab 02/12/22  0602 02/11/22  1207   INR 2.48* 1.58*      Imaging:   Recent Results (from the past 24 hour(s))   US Bladder Only    Narrative    US BLADDER ONLY 2/11/2022 1:55 PM    CLINICAL HISTORY: Acute urinary retention on anticoagulation, evaluate  for clot in bladder.    TECHNIQUE: Bladder " Ultrasound.    COMPARISON: None.    FINDINGS: There is a large mass in the urinary bladder that measures  13.3 x 8.8 x 9.0 cm and demonstrates no internal vascular flow.      Impression    IMPRESSION:  1.  Large avascular mass in the urinary bladder likely represents a  large blood clot.    PALLAVI WILSON MD         SYSTEM ID:  [id number]       Kelly Gary MD

## 2022-02-12 NOTE — PROGRESS NOTES
"[id number]    AOx4. SBA. 3-4/10 pain, PRN oxy and tramadol given. CBI infusing w red output. CPAP worn overnight. LR infusing at 75mL/hr.     BP (!) 163/68 (BP Location: Left arm)   Pulse 76   Temp 97.7  F (36.5  C) (Oral)   Resp 16   Ht 1.753 m (5' 9\")   Wt 90.7 kg (200 lb)   SpO2 100%   BMI 29.53 kg/m      "

## 2022-02-12 NOTE — PLAN OF CARE
"  INPATIENT NOTE: [id number]    PRIMARY PROBLEM: Gross Hematuria.      BP (!) 160/77 (BP Location: Left arm)   Pulse 78   Temp 97.4  F (36.3  C) (Oral)   Resp 18   Ht 1.753 m (5' 9\")   Wt 90.7 kg (200 lb)   SpO2 99%   BMI 29.53 kg/m         Orientation: Alert and Oriented x4  Neuro: Intact   Pain status: Pain is been well managed with PRN oxycodone, Tramadol and IV dilaudid alternating.    Resp: Lung sounds are Clear. Denies any SOB.   Cardiac: WNL, Denies any Chest Pain.   GI: Bowels are active x 4 Quads. Denies any constipation.  : 24- Maltese 3-grullon cathter. On Continues CBI going fast with normal saline. Urine is still cherry red. No clots  Skin: WNL   LDA: Peripheral IV   Infusions: Patient has Lactated Ringer's Infusing at 75 mL per hour.   Pertinent Labs: Creat 1.28  Diet: Regular  Activity: are SBA with no assistive devices   Alarms/Safety: Bed Alarm  Consults: Urology   Discharge Plan: Home with Self care. Plan is to continue to monitor and provide supportive cares. Urology following and will see this morning.      Will continue to monitor and provide cares.     Lyndsey King RN        "

## 2022-02-12 NOTE — UTILIZATION REVIEW
"[facility]   Admission Status; Secondary Review Determination     Admission Date: 2/11/2022 11:17 AM      Under the authority of the Utilization Management Committee, the utilization review process indicated a secondary review on the above patient.  The review outcome is based on review of the medical records, discussions with staff, and applying clinical experience noted on the date of the review.        (X)      Inpatient Status Appropriate - This patient's medical care is consistent with medical management for inpatient care and reasonable inpatient medical practice.      () Observation Status Appropriate - This patient does not meet hospital inpatient criteria and is placed in observation status. If this patient's primary payer is Medicare and was admitted as an inpatient, Condition Code 44 should be used and patient status changed to \"observation\".   () Admission Status NOT Appropriate - This patient's medical care is not consistent with medical management for Inpatient or Observation Status.          RATIONALE FOR DETERMINATION   77 yo male with a-fib, HTN, endocarditis (Nov 2021), obesity and mechanical aortic and mitral valves who presented to the ER with inability to urinate and bladder pain in the setting of a recent vaporization of the prostate (2/8/22) with placement of a Mohan catheter post procedure . He underwent cystoscopy with evacuation of bladder hematoma with fulguration and remains on continuous bladder irrigation for hematuria.  He must remain anticoagulated given mechanical valve x2. He has developed acute blood loss anemia (Hgb 14 --> 10) Also with post obstructive VERONICA. Given above, including risk, complexity and level of intensity of service he is appropriate for inpatient status.     The severity of illness, intensity of service provided, expected LOS and risk for adverse outcome make the care complex, high risk and appropriate for hospital admission.        The information on " this document is developed by the utilization review team in order for the business office to ensure compliance.  This only denotes the appropriateness of proper admission status and does not reflect the quality of care rendered.         The definitions of Inpatient Status and Observation Status used in making the determination above are those provided in the CMS Coverage Manual, Chapter 1 and Chapter 6, section 70.4.      Sincerely,     Rosa Gary MD MPH  Utilization Review  [facility].

## 2022-02-12 NOTE — PROGRESS NOTES
Urology    Feels well after procedure yesterday  No pain    Urine was at first clear when I came in the room, but them became cherry red with minimal valsalva    A/P: I recommend holding lovenox and coumadin until urine output is completely clear. That may be as soon as this evening. Hold all blood thinners right now and will re-assess in the evening

## 2022-02-12 NOTE — OP NOTE
Procedure Date: 02/11/2022    UROLOGY OPERATIVE REPORT    SURGEON:  Tonny Lacy MD    PREOPERATIVE DIAGNOSIS:  Gross hematuria, urinary retention.    POSTOPERATIVE DIAGNOSIS:  Gross hematuria, urinary retention.    PROCEDURE PERFORMED:  Cystoscopy, evacuation of bladder hematoma, fulguration of the prostate.    ANESTHESIA:  General.    COMPLICATIONS:  None.    INDICATIONS FOR PROCEDURE:  Celia Stallings is a 76-year-old gentleman who has developed hematuria and clot retention after a photovaporization of the prostate 3 days ago.  He now presents for evacuation of bladder hematoma.    DETAILS OF PROCEDURE:  Risks and benefits of the procedure were explained in detail to the patient, and informed consent was obtained.  The patient was brought to the operating room and placed supine on the operative table where he underwent a general anesthetic.  He was then moved down to the dorsal lithotomy position where he was prepped and draped in usual sterile fashion.      The procedure began by using the visual obturator to introduce the 26-Iraqi resectoscope sheath through the urethra and into the bladder.  The bladder was filled with clot.  I used the Urovac  multiple times in order to remove all the clot from the bladder.  Once all the blood clot had been removed from the bladder I performed a complete cystoscopy.  The bladder had a large capacity but there were no urothelial abnormalities identified.  I identified each ureteral orifice.  I looked back in the prostatic urethra and there was bleeding in the prostate urethra, mainly in the area of the bladder neck.  I used the electrocautery loop to fulgurate these areas until there was good hemostasis.  I then removed the scope.  I drained the bladder with a 24-Iraqi 3-way Mohan catheter.  I filled the balloon with 30 mL of sterile water.  I hand irrigated the catheter until output was clear.  I put the catheter to continuous irrigation with normal saline and  the procedure was concluded.    The patient tolerated the procedure well without complications.  He went to post-anesthetic care unit in good condition.  He will stay in the hospital for overnight with continuous irrigation.      Tonny Lacy MD        D: 2022   T: 2022   MT: [facility]    Name:     JAKE RINCON  MRN:      [id number]        Account:        [id number]   :      1945           Procedure Date: 2022     Document: [id number]

## 2022-02-12 NOTE — PHARMACY-ANTICOAGULATION SERVICE
Clinical Pharmacy - Warfarin Dosing Consult     Pharmacy has been consulted to manage this patient s warfarin therapy.  Indication: Mechanical Mitral Valve Replacement  Therapy Goal: INR 2.5-3.5  Warfarin Prior to Admission: Yes  Warfarin PTA Regimen: 5 mg daily  Significant drug interactions: atorvastatin  Recent documented change in oral intake/nutrition: Unknown    INR   Date Value Ref Range Status   02/12/2022 2.48 (H) 0.85 - 1.15 Final   02/11/2022 1.58 (H) 0.85 - 1.15 Final       Recommend warfarin 5mg mg today in the AM per request.  Pharmacy will monitor Celia Stallings daily and order warfarin doses to achieve specified goal.      Please contact pharmacy as soon as possible if the warfarin needs to be held for a procedure or if the warfarin goals change.

## 2022-02-13 LAB — INR PPP: 2.14 (ref 0.85–1.15)

## 2022-02-13 PROCEDURE — 250N000013 HC RX MED GY IP 250 OP 250 PS 637: Performed by: INTERNAL MEDICINE

## 2022-02-13 PROCEDURE — 36415 COLL VENOUS BLD VENIPUNCTURE: CPT | Performed by: INTERNAL MEDICINE

## 2022-02-13 PROCEDURE — 120N000004 HC R&B MS OVERFLOW

## 2022-02-13 PROCEDURE — 99231 SBSQ HOSP IP/OBS SF/LOW 25: CPT | Performed by: UROLOGY

## 2022-02-13 PROCEDURE — 99232 SBSQ HOSP IP/OBS MODERATE 35: CPT | Performed by: INTERNAL MEDICINE

## 2022-02-13 PROCEDURE — 85610 PROTHROMBIN TIME: CPT | Performed by: INTERNAL MEDICINE

## 2022-02-13 RX ORDER — WARFARIN SODIUM 5 MG/1
5 TABLET ORAL ONCE
Status: DISCONTINUED | OUTPATIENT
Start: 2022-02-13 | End: 2022-02-13

## 2022-02-13 RX ADMIN — TAMSULOSIN HYDROCHLORIDE 0.4 MG: 0.4 CAPSULE ORAL at 08:33

## 2022-02-13 RX ADMIN — OXYCODONE HYDROCHLORIDE 5 MG: 5 TABLET ORAL at 13:17

## 2022-02-13 RX ADMIN — ACETAMINOPHEN 650 MG: 325 TABLET, FILM COATED ORAL at 21:53

## 2022-02-13 RX ADMIN — TRAMADOL HYDROCHLORIDE 50 MG: 50 TABLET ORAL at 21:53

## 2022-02-13 RX ADMIN — AMLODIPINE BESYLATE 2.5 MG: 2.5 TABLET ORAL at 08:33

## 2022-02-13 RX ADMIN — FINASTERIDE 5 MG: 5 TABLET, FILM COATED ORAL at 08:33

## 2022-02-13 RX ADMIN — WARFARIN SODIUM 5 MG: 5 TABLET ORAL at 08:33

## 2022-02-13 RX ADMIN — ACETAMINOPHEN 650 MG: 325 TABLET, FILM COATED ORAL at 12:37

## 2022-02-13 RX ADMIN — OXYCODONE HYDROCHLORIDE 5 MG: 5 TABLET ORAL at 04:24

## 2022-02-13 RX ADMIN — METOPROLOL SUCCINATE 25 MG: 25 TABLET, EXTENDED RELEASE ORAL at 08:33

## 2022-02-13 RX ADMIN — OXYCODONE HYDROCHLORIDE 5 MG: 5 TABLET ORAL at 09:14

## 2022-02-13 RX ADMIN — ACETAMINOPHEN 650 MG: 325 TABLET, FILM COATED ORAL at 03:55

## 2022-02-13 RX ADMIN — LOSARTAN POTASSIUM 100 MG: 100 TABLET, FILM COATED ORAL at 08:33

## 2022-02-13 RX ADMIN — ATORVASTATIN CALCIUM 20 MG: 20 TABLET, FILM COATED ORAL at 08:33

## 2022-02-13 ASSESSMENT — ACTIVITIES OF DAILY LIVING (ADL)
ADLS_ACUITY_SCORE: 3

## 2022-02-13 NOTE — PROGRESS NOTES
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     On 2/8/2022, the patient had a laser vaporization of the prostate secondary to BPH with urinary retention at [facility]. The patient had been bridged to Lovenox from his warfarin prior to the procedure. After the procedure the patient resumed his bridging back to warfarin with Lovenox. On Wednesday 2/9, the patient removed his Mohan catheter and was urinating without issue. On the evening of 2/10, he was unable to urinate and developed bladder discomfort. He called his urologist and was told to come to the emergency department.    Patient underwent cystoscopy with evacuation of bladder hematoma and fulguration of the bladder on 2/11.  Remains on CBI for hematuria.    Problem List/Assessment and Plan:     Urinary Retention with Hematuria with recent photovaporization of prostate on 2/8 now s/p cystoscopy 2/11: Underwent Photovaporization of Prostate secondary to BPH on 2/8/2022 at [facility] with development of urinary retention prompting this admission.  Taken to OR on 2/11/2022 for Cystoscopy, Evacuation of Bladder Hematoma, and Fulguration of the Bladder.  Remains on CBI, if this is slowed down he develops clots.  He needs to remain on anticoagulation given his underlying mechanical valves,  very high risk for clot if not on anticoagulation.  - Urology following, appreciate recommendations  - Need to continue Coumadin given mechanical MV and high risk for clot  - Continue grullon catheter with CBI     Bicuspid Aortic Valve with Mixed Valve Disease s/p AVR with Metallic Valve in 1999  Mitral Leaflet Rupture s/p MVR with Mechanical MV on Coumadin in 1999  - Follows with [facility]  - Continue Coumadin, INR 2.14 today which is technically subtherapeutic but I will avoid giving a dose of Lovenox as he is near therapeutic range and still having bleeding     Acute Kidney Injury: Baseline creatinine ~0.7, on presentation was elevated at 1.28.  This is in the setting of urinary retention, improved to 0.93  - Daily BMP     Mild Hypercalcemia  - Repeat BMP in AM     Hypertension  - Continue PTA Losartan, Amlodipine and Metoprolol    ABLA: Hgb was 13.9 on admission, decreased to 10. This is from hematuria/clots. No indication for transfusion, repeat tomorrow.     Chronic Medical Problems:  Dilated Thoracic Aorta - follows with [facility]  Hx of Infective Endocarditis of Mechanical MV in 11/2021  Dyslipidemia  Gout    Diet: Regular Diet Adult    DVT Prophylaxis: Warfarin  Grullon Catheter: PRESENT, indication: Gross Hematuria  Code Status: Full Code      Disposition Plan   Expected Discharge: 02/14/2022     Anticipated discharge location:  Awaiting care coordination huddle  Delays:on CBI    Entered: Kelly Gary MD 02/13/2022, 8:55 AM       The patient's care was discussed with the Bedside Nurse and Patient.    Hospitalist Service  [facility]          Interval History (Subjective):      Patient states he is feeling well today.  At about 4:30 AM he had significant pain and pressure in his bladder.  His nurse hand irrigated several large clots and CBI was turned back up.  This morning urology evaluated him and hand irrigated a few small clots and turned the CBI down.  Currently  "patient denies any bladder pain or spasms.  No chest pain, shortness of breath, dizziness or lightheadedness.  He is eating and drinking well.  Ambulating fine.  He is hopeful to go home soon as possible but understands that he needs to stop bleeding and having clots before he can do that.                  Physical Exam:      Last Vital Signs:  /53   Pulse 78   Temp 97.8  F (36.6  C) (Oral)   Resp 16   Ht 1.753 m (5' 9\")   Wt 90.7 kg (200 lb)   SpO2 98%   BMI 29.53 kg/m      General: Alert, awake, no acute distress.  HEENT: Normocephalic and atraumatic, eyes anicteric and without scleral injection, EOMI, face symmetric, MMM.  Cardiac: RRR, normal S1, S2. Clicks present at both mitral and aortic valve, no g/r, no LE edema.  Pulmonary: Normal chest rise, normal work of breathing.  Lungs CTAB without crackles or wheezing.  Abdomen: soft, non-tender, non-distended.  Normoactive bowel sounds, no guarding or rebound tenderness.  : Mohan in place, CBI running with pink-tinged colored urine but no clots  Extremities: no deformities.  Warm, well perfused.  Skin: no rashes or lesions.  Warm and Dry.  Neuro: No focal deficits.  Speech clear.  Coordination and strength grossly normal.  Psych: Alert and oriented x3. Appropriate affect.         Medications:      All current medications were reviewed with changes reflected in problem list.         Data:      All new lab and imaging data was reviewed.   Labs:  Recent Labs   Lab 02/12/22  0602 02/11/22  1207    137   POTASSIUM 4.4 3.6   CHLORIDE 109 105   CO2 28 25   ANIONGAP 2* 7   * 145*   BUN 17 20   CR 0.93 1.28*   GFRESTIMATED 84 57*   RICKI 8.8 10.4*     Recent Labs   Lab 02/12/22  0602 02/11/22  1207   WBC 9.1 11.2*   HGB 10.0* 13.9   HCT 30.9* 41.8   MCV 89 86    270     Recent Labs   Lab 02/13/22  0531 02/12/22  0602 02/11/22  1207   INR 2.14* 2.48* 1.58*      Imaging:   No results found for this or any previous visit (from the past 24 " hour(s)).    Kelly Gary MD

## 2022-02-13 NOTE — PROGRESS NOTES
Pt complaining of pressure build up and pain. Irrigated with 360 ccs NS. No clots, only light pink, clear irrigant return. Pt did report some relief. Grullon continuing to drain pink, clear urine. Pt stood up and repositioned. Pt reports he cannot always tell the difference between bladder pain and penis pain. Pt was up walking. Possible that grullon had kinked as he was sitting on edge of bed when RN arrived. Oxycodone given after irrigation. Continue CBI at  mod/slow.Will continue to monitor for clots or darkening urine.

## 2022-02-13 NOTE — PROGRESS NOTES
Urology    Patient was restarted/continued on coumadin yesterday and INR is therapeutic    Grullon had clot retention overnight and nurse hand irrigated multiple clots    This morning urine was clear I grullon (with irrigation)  I hand irrigated vigorously and got out a small amount of clot    A/P: will slowly turn down continuous irrigation during the day  Will be able to discharge once urine clear without irrigation

## 2022-02-13 NOTE — PLAN OF CARE
Vitals:  Labs:INR 2.14, Hgb 10.0  Cardiac:WDL  Resp:WDL  Neuro:WDL  GI:WDL, LBM 2/13, tolerating oral intake  :Slowing CBI, now a slow drip rate, urine clear pink-grapefruit, a few small clots in tubing, small clots irrigated out by urology  Skin:WDL  Activity:Ind  Diet:Reg  Pain:2-6/10, bladder and penis pain, improved with tylenol, oxycodone 5 mg PO x 2 this shift   Plan:Continue coumadin because of mechanical mitral valve per hospitalist, urology consulted, continue to attempt to slow CBI

## 2022-02-13 NOTE — PLAN OF CARE
"  INPATIENT NOTE: [id number]    PRIMARY PROBLEM:      /48 (BP Location: Right arm)   Pulse 69   Temp 98.2  F (36.8  C) (Oral)   Resp 16   Ht 1.753 m (5' 9\")   Wt 90.7 kg (200 lb)   SpO2 95%   BMI 29.53 kg/m       Orientation: Alert and Oriented x4  Neuro: Intact   Pain status: Pain is been well managed with PRN oxycodone and Tylenol.   Resp: Lung sounds are Clear. Denies any SOB.   Cardiac: WNL, Denies any Chest Pain.   GI: Bowels are active x 4 Quads. Denies any constipation.  : 24- Citizen of [place] 3-grullon cathter. On Continues CBI going mod with normal saline most of the night. Urine is light pink. Patient complained of bladder fullness and discomfort  at 0430 am. Bladder scan to have 545 ml. Hand irrigated x 2 with 60 ml. Removed multiple large clots. Patient CBI is back to light pink draining and running at a fast rate.   Skin: WNL   LDA: Peripheral IV   Infusions: Patient has Lactated Ringer's Infusing at 75 mL per hour.   Pertinent Labs: Creat 1.28  Diet: Regular  Activity: are SBA with no assistive devices   Alarms/Safety: Bed Alarm  Consults: Urology   Discharge Plan: Home with Self care. Plan is to continue to monitor and provide supportive cares. Urology following.    Will continue to monitor and provide cares.     Lyndsey King RN        "

## 2022-02-13 NOTE — PLAN OF CARE
"/52 (BP Location: Right arm)   Pulse 63   Temp 98.4  F (36.9  C) (Oral)   Resp 18   Ht 1.753 m (5' 9\")   Wt 90.7 kg (200 lb)   SpO2 97%   BMI 29.53 kg/m        Vitals stable. Pt alert and oriented x4. SBA to get up due to CBI tubing, independent when up. Reports penis pain, tylenol and ultram given. CBI ran open most of shift. Now a light kaleb color, CBI slowed. Pt tolerating well. No large clots noted. Patient ambulated the halls 4x this shift. Tolerating regular diet. Hgb stable. Coumadin given per MD orders. Urology following. Pt resting comfortably. Will continue to provide supportive cares.   "

## 2022-02-13 NOTE — PROGRESS NOTES
Pt reported he felt like he had more bladder pressure, CBI running slow, and catheter draining. Pt bladder scanned for 84. Urine continues to be clear pink. Tubing repositioned. Pt reports pressure sensation improving.

## 2022-02-14 VITALS
SYSTOLIC BLOOD PRESSURE: 145 MMHG | OXYGEN SATURATION: 100 % | RESPIRATION RATE: 18 BRPM | HEART RATE: 88 BPM | HEIGHT: 69 IN | DIASTOLIC BLOOD PRESSURE: 60 MMHG | WEIGHT: 200 LBS | BODY MASS INDEX: 29.62 KG/M2 | TEMPERATURE: 97.9 F

## 2022-02-14 LAB
ANION GAP SERPL CALCULATED.3IONS-SCNC: 3 MMOL/L (ref 3–14)
BUN SERPL-MCNC: 12 MG/DL (ref 7–30)
CALCIUM SERPL-MCNC: 9.3 MG/DL (ref 8.5–10.1)
CHLORIDE BLD-SCNC: 107 MMOL/L (ref 94–109)
CO2 SERPL-SCNC: 27 MMOL/L (ref 20–32)
CREAT SERPL-MCNC: 0.75 MG/DL (ref 0.66–1.25)
GFR SERPL CREATININE-BSD FRML MDRD: >90 ML/MIN/1.73M2
GLUCOSE BLD-MCNC: 115 MG/DL (ref 70–99)
HGB BLD-MCNC: 9.1 G/DL (ref 13.3–17.7)
INR PPP: 2.09 (ref 0.85–1.15)
POTASSIUM BLD-SCNC: 4.1 MMOL/L (ref 3.4–5.3)
SODIUM SERPL-SCNC: 137 MMOL/L (ref 133–144)

## 2022-02-14 PROCEDURE — 36415 COLL VENOUS BLD VENIPUNCTURE: CPT | Performed by: INTERNAL MEDICINE

## 2022-02-14 PROCEDURE — 250N000013 HC RX MED GY IP 250 OP 250 PS 637: Performed by: INTERNAL MEDICINE

## 2022-02-14 PROCEDURE — 99232 SBSQ HOSP IP/OBS MODERATE 35: CPT | Performed by: INTERNAL MEDICINE

## 2022-02-14 PROCEDURE — 85018 HEMOGLOBIN: CPT | Performed by: INTERNAL MEDICINE

## 2022-02-14 PROCEDURE — 99231 SBSQ HOSP IP/OBS SF/LOW 25: CPT | Performed by: PHYSICIAN ASSISTANT

## 2022-02-14 PROCEDURE — 85610 PROTHROMBIN TIME: CPT | Performed by: INTERNAL MEDICINE

## 2022-02-14 PROCEDURE — 82310 ASSAY OF CALCIUM: CPT | Performed by: INTERNAL MEDICINE

## 2022-02-14 PROCEDURE — 99207 PR CDG-CODE CATEGORY CHANGED: CPT | Performed by: INTERNAL MEDICINE

## 2022-02-14 RX ORDER — WARFARIN SODIUM 5 MG/1
5 TABLET ORAL ONCE
Status: COMPLETED | OUTPATIENT
Start: 2022-02-14 | End: 2022-02-14

## 2022-02-14 RX ORDER — LIDOCAINE HYDROCHLORIDE 20 MG/ML
JELLY TOPICAL ONCE
Status: DISCONTINUED | OUTPATIENT
Start: 2022-02-14 | End: 2022-02-14 | Stop reason: HOSPADM

## 2022-02-14 RX ADMIN — WARFARIN SODIUM 5 MG: 5 TABLET ORAL at 09:28

## 2022-02-14 RX ADMIN — FINASTERIDE 5 MG: 5 TABLET, FILM COATED ORAL at 08:16

## 2022-02-14 RX ADMIN — TAMSULOSIN HYDROCHLORIDE 0.4 MG: 0.4 CAPSULE ORAL at 08:15

## 2022-02-14 RX ADMIN — ACETAMINOPHEN 650 MG: 325 TABLET, FILM COATED ORAL at 04:17

## 2022-02-14 RX ADMIN — ATORVASTATIN CALCIUM 20 MG: 20 TABLET, FILM COATED ORAL at 08:16

## 2022-02-14 RX ADMIN — OXYCODONE HYDROCHLORIDE 5 MG: 5 TABLET ORAL at 00:32

## 2022-02-14 RX ADMIN — AMLODIPINE BESYLATE 2.5 MG: 2.5 TABLET ORAL at 08:15

## 2022-02-14 RX ADMIN — OXYCODONE HYDROCHLORIDE 5 MG: 5 TABLET ORAL at 10:38

## 2022-02-14 RX ADMIN — METOPROLOL SUCCINATE 25 MG: 25 TABLET, EXTENDED RELEASE ORAL at 08:16

## 2022-02-14 RX ADMIN — LOSARTAN POTASSIUM 100 MG: 100 TABLET, FILM COATED ORAL at 08:15

## 2022-02-14 ASSESSMENT — ACTIVITIES OF DAILY LIVING (ADL)
ADLS_ACUITY_SCORE: 3

## 2022-02-14 NOTE — PLAN OF CARE
"Vitals:Blood pressure 134/57, pulse 70, temperature 97.8  F (36.6  C), temperature source Oral, resp. rate 16, height 1.753 m (5' 9\"), weight 90.7 kg (200 lb), SpO2 95 %.  Labs:INR 2.14, Hgb 10.0  Cardiac:WDL  Resp:WDL  Neuro:WDL  GI:WDL, LBM 2/13, tolerating oral intake  :Slowing CBI, now a slow drip rate, urine clear pink-grapefruit, a few small clots in tubing today, small clots irrigated out by urology this morning  Skin:WDL  Activity:Ind  Diet:Reg  Pain:2-6/10, bladder and penis pain, improved with tylenol, oxycodone 5 mg PO x 2 this shift   Plan:Continue coumadin because of mechanical mitral valve per hospitalist, urology consulted, continue to attempt to slow CBI    "

## 2022-02-14 NOTE — DISCHARGE SUMMARY
[facility]  Discharge Summary  Name: Celia Stallings    MRN: [id number]  YOB: 1945    Age: 76 year old  Date of Discharge:  2/14/2022  Date of Admission: 2/11/2022  Primary Care Provider: Hilton Rebolledo  Discharge Physician:  Kelly Gary MD  Discharging Service:  Hospitalist    Patient had urinary retention once catheter was removed. Adamantly refused to have this replaced. I spent extensive time discussing reasoning for this. Ultimately he is not willing to have this placed. I told him that I would keep him tonight to monitor but he is not willing to stay.  Explained that he would need to sign out AMA and the risks of urinary retention including bladder distension, pain, renal failure etc.  He states that he understands these risks and will be signing out AMA.      Discharge Diagnoses:  1. Urinary retention with hematuria with recent photovaporization of prostate on 2/8 now s/p cystoscopy with clot evacuation on 2/11  2. Bicuspid Aortic Valve with Mixed Valve Disease s/p AVR with Metallic Valve in 1999  Mitral Leaflet Rupture s/p MVR with Mechanical MV on Coumadin in 1999  3. VERONICA  4. Mild hypercalcemia  5. HTN  6. ABLA  7. Dilated Thoracic Aorta  8. History of Endocarditis in 11/2021  9. HLD  10. Gout     Follow-ups Needed After Discharge   Follow up with primary Urologist, Dr. Yanez    Unresulted Labs Ordered in the Past 30 Days of this Admission     No orders found from 10/16/2018 to 12/16/2018.        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      On 2/8/2022, the patient had a laser vaporization of the prostate secondary to BPH with urinary retention at [facility]. The patient had been bridged to Lovenox from his warfarin prior to the procedure. After the procedure the patient resumed his bridging back to warfarin with Lovenox. On Wednesday 2/9, the patient removed his Grullon catheter and was urinating without issue. On the evening of 2/10, he was unable to urinate and developed bladder discomfort. He called his urologist and was told to come to the emergency department.     Patient underwent cystoscopy with evacuation of bladder hematoma and fulguration of the bladder on 2/11.  He remained on CBI for hematuria. Grullon removed on day of discharge.  Patient was only able to void a minimal amount and PVR was 538.  Urology recommended placement of grullon catheter and discharge with catheter.  Patient adamantly refused this.      I spent extensive time discussing reasoning for grullon placement and risks of urinary retention including renal failure.  He refuses to have this placed.  I then stated that I would keep him overnight to monitor and repeat renal function tomorrow but he refuses this as well.  He will sign out AMA.     Problem List/Assessment and Plan:      Urinary Retention with Hematuria with recent photovaporization of prostate on 2/8 now s/p cystoscopy 2/11: Underwent Photovaporization of Prostate secondary to BPH on 2/8/2022 at [facility] with development of urinary retention prompting this admission.  Taken to OR on 2/11/2022 for Cystoscopy, Evacuation of Bladder Hematoma, and Fulguration of the Bladder. Was treated with CBI for hematuria which resolved prior to discharge.  Grullon catheter removed and he urinated prior to discharge.  He remained on anticoagulation given his underlying mechanical valves, very high risk for clot if not on anticoagulation.  He will  "need to follow up with his outpatient urologist upon discharge.     Bicuspid Aortic Valve with Mixed Valve Disease s/p AVR with Metallic Valve in 1999  Mitral Leaflet Rupture s/p MVR with Mechanical MV on Coumadin in 1999  - Follows with [facility]  - Continue Coumadin, INR 2.09 today which is technically subtherapeutic but I will avoid giving a dose of Lovenox as he is near therapeutic range and had significant bleeding related to hematuria.  Will continue PTA Coumadin dosing, should have INR checked Tuesday or Wednesday this week     Acute Kidney Injury: Baseline creatinine ~0.7, on presentation was elevated at 1.28.  This is in the setting of urinary retention, normalized to 0.75.     Mild Hypercalcemia - Resolved.     Hypertension  - Continue PTA Losartan, Amlodipine and Metoprolol     ABLA: Hgb was 13.9 on admission, decreased to 9.1. This is from hematuria/clots. No indication for transfusion.     Chronic Medical Problems:  Dilated Thoracic Aorta - follows with [facility]  Hx of Infective Endocarditis of Mechanical MV in 11/2021  Dyslipidemia  Gout     Discharge Disposition:  Discharged to home     Allergies:  Allergies   Allergen Reactions     Lisinopril Cough        Condition on Discharge:  Discharge condition: Stable   Discharge vitals: Blood pressure 131/56, pulse 87, temperature 98.4  F (36.9  C), temperature source Oral, resp. rate 18, height 1.753 m (5' 9\"), weight 90.7 kg (200 lb), SpO2 97 %.   Code status on discharge: Full Code     History of Illness:  See detailed admission note for full details.    Physical Exam:  Blood pressure 131/56, pulse 87, temperature 98.4  F (36.9  C), temperature source Oral, resp. rate 18, height 1.753 m (5' 9\"), weight 90.7 kg (200 lb), SpO2 97 %.  Wt Readings from Last 1 Encounters:   02/11/22 90.7 kg (200 lb)     General: Alert, awake, no acute distress.  HEENT: Normocephalic and atraumatic, eyes anicteric and without scleral injection, EOMI, face " symmetric, MMM.  Cardiac: RRR, normal S1, S2. Clicks present at both mitral and aortic valve, no g/r, no LE edema.  Pulmonary: Normal chest rise, normal work of breathing.  Lungs CTAB without crackles or wheezing.  Abdomen: soft, non-tender, non-distended.  Normoactive bowel sounds, no guarding or rebound tenderness.  : Mohan in place, CBI running with yellow urine, no clots  Extremities: no deformities.  Warm, well perfused.  Skin: no rashes or lesions.  Warm and Dry.  Neuro: No focal deficits.  Speech clear.  Coordination and strength grossly normal.  Psych: Alert and oriented x3. Appropriate affect.    Procedures other than Imaging:  Cystoscopy  CBI  Phlebotomy     Imaging:  Results for orders placed or performed during the hospital encounter of 02/11/22   US Bladder Only    Narrative    US BLADDER ONLY 2/11/2022 1:55 PM    CLINICAL HISTORY: Acute urinary retention on anticoagulation, evaluate  for clot in bladder.    TECHNIQUE: Bladder Ultrasound.    COMPARISON: None.    FINDINGS: There is a large mass in the urinary bladder that measures  13.3 x 8.8 x 9.0 cm and demonstrates no internal vascular flow.      Impression    IMPRESSION:  1.  Large avascular mass in the urinary bladder likely represents a  large blood clot.    PALLAVI WILSON MD         SYSTEM ID:  [id number]        Consultations:  Consultations This Hospital Stay   UROLOGY IP CONSULT  PHARMACY TO DOSE WARFARIN     Recent Lab Results:  Recent Labs   Lab 02/14/22  0653 02/12/22  0602 02/11/22  1207   WBC  --  9.1 11.2*   HGB 9.1* 10.0* 13.9   HCT  --  30.9* 41.8   MCV  --  89 86   PLT  --  196 270     Recent Labs   Lab 02/14/22  0653 02/12/22  0602 02/11/22  1207    139 137   POTASSIUM 4.1 4.4 3.6   CHLORIDE 107 109 105   CO2 27 28 25   ANIONGAP 3 2* 7   * 108* 145*   BUN 12 17 20   CR 0.75 0.93 1.28*   GFRESTIMATED >90 84 57*   RICKI 9.3 8.8 10.4*     Recent Labs   Lab 02/14/22  0653 02/13/22  0531 02/12/22  0602   INR 2.09* 2.14* 2.48*           Pending Results:    Unresulted Labs Ordered in the Past 30 Days of this Admission     No orders found from 1/12/2022 to 2/12/2022.           Discharge Instructions and Follow-Up:   Discharge Orders   No discharge procedures on file.  Discharge Medications   Current Discharge Medication List      CONTINUE these medications which have NOT CHANGED    Details   amLODIPine (NORVASC) 2.5 MG tablet Take 2.5 mg by mouth daily      atorvastatin (LIPITOR) 20 MG tablet Take 20 mg by mouth daily      enoxaparin ANTICOAGULANT (LOVENOX) 100 MG/ML syringe Inject 90 mg Subcutaneous every 12 hours      finasteride (PROSCAR) 5 MG tablet Take 5 mg by mouth daily      losartan (COZAAR) 100 MG tablet Take 100 mg by mouth daily      metoprolol succinate ER (TOPROL-XL) 25 MG 24 hr tablet Take 25 mg by mouth daily      tamsulosin (FLOMAX) 0.4 MG capsule Take 0.4 mg by mouth daily      warfarin ANTICOAGULANT (COUMADIN) 5 MG tablet Take 5 mg by mouth daily      acetaminophen (TYLENOL) 325 MG tablet Take 2 tablets (650 mg) by mouth every 4 hours as needed for mild pain  Refills: 0    Associated Diagnoses: Discitis of lumbar region           Patient left AMA and refused to have catheter placed.    Time Spent on this Encounter   I, Kelly Gary MD, personally saw the patient today and spent greater than 30 minutes discharging this patient.    Kelly Gary MD

## 2022-02-14 NOTE — PROGRESS NOTES
[facility] Urology Progress Note          Assessment and Plan:     Assessment:    POD 3 Cystoscopy, evacuation of bladder hematoma, fulguration of the prostate.    Gross hematuria    Acute urinary obstruction      Plan:   -Discontinue Mohan catheter this morning. Ensure that patient can urinate.  -Okay to discharge from Urology perspective, if he is able to urinate.  -Follow up with his primary Urologist, Dr. Yanez.    Nicole Acevedo PA-C   [facility]  [phone]               Interval History:     Doing well.  Denies N/V/F/C/SOB/CP. 3-way Mohan in place with CBI almost off.  Clear light peach urine.  Some irritation from catheter. Hemoglobin 9.1.  INR 2.09.       Review of Systems:     The 5 point Review of Systems is negative other than noted in the HPI             Medications:     Current Facility-Administered Medications Ordered in Epic   Medication Dose Route Frequency Last Rate Last Admin     acetaminophen (TYLENOL) tablet 650 mg  650 mg Oral Q4H PRN   650 mg at 02/14/22 0417    Or     acetaminophen (TYLENOL) Suppository 650 mg  650 mg Rectal Q4H PRN         amLODIPine (NORVASC) tablet 2.5 mg  2.5 mg Oral Daily   2.5 mg at 02/14/22 0815     atorvastatin (LIPITOR) tablet 20 mg  20 mg Oral Daily   20 mg at 02/14/22 0816     finasteride (PROSCAR) tablet 5 mg  5 mg Oral Daily   5 mg at 02/14/22 0816     hydrALAZINE (APRESOLINE) injection 10 mg  10 mg Intravenous Q4H PRN         HYDROmorphone (DILAUDID) injection 0.3-0.5 mg  0.3-0.5 mg Intravenous Q2H PRN   0.5 mg at 02/11/22 2326     lidocaine (LMX4) kit   Topical Q1H PRN         lidocaine 1 % 0.1-1 mL  0.1-1 mL Other Q1H PRN         losartan (COZAAR) tablet 100 mg  100 mg Oral Daily   100 mg at 02/14/22 0815     metoprolol succinate ER (TOPROL-XL) 24 hr tablet 25 mg  25 mg Oral Daily   25 mg at 02/14/22 0816     naloxone (NARCAN) injection 0.2 mg  0.2 mg Intravenous Q2 Min PRN        Or     naloxone (NARCAN) injection 0.4 mg  0.4 mg  Intravenous Q2 Min PRN        Or     naloxone (NARCAN) injection 0.2 mg  0.2 mg Intramuscular Q2 Min PRN        Or     naloxone (NARCAN) injection 0.4 mg  0.4 mg Intramuscular Q2 Min PRN         ondansetron (ZOFRAN) injection 4 mg  4 mg Intravenous Q30 Min PRN   4 mg at 02/11/22 1206     oxyCODONE (ROXICODONE) tablet 5 mg  5 mg Oral Q4H PRN   5 mg at 02/14/22 0032     sodium chloride (PF) 0.9% PF flush 3 mL  3 mL Intracatheter Q8H   3 mL at 02/14/22 0815     sodium chloride (PF) 0.9% PF flush 3 mL  3 mL Intracatheter q1 min prn         tamsulosin (FLOMAX) capsule 0.4 mg  0.4 mg Oral Daily   0.4 mg at 02/14/22 0815     traMADol (ULTRAM) half-tab 25-50 mg  25-50 mg Oral Q6H PRN   50 mg at 02/13/22 2153     Warfarin Therapy Reminder (Check START DATE - warfarin may be starting in the FUTURE)  1 each Does not apply Continuous PRN         No current [facility]-ordered outpatient medications on file.                  Physical Exam:   Vitals were reviewed  Patient Vitals for the past 8 hrs:   BP Pulse Resp SpO2   02/14/22 0747 131/56 87 18 97 %     GEN: NAD, lying in bed  EYES: EOMI  MOUTH: MMM  NECK: Supple  RESP: Unlabored breathing  CARDIAC: No LE edema  SKIN: Warm  NEURO: AAO  URO: 3-way Mohan in place with CBI almost off.  Clear light peach urine.           Data:   No results found for: NTBNPI, NTBNP  Lab Results   Component Value Date    WBC 9.1 02/12/2022    WBC 11.2 (H) 02/11/2022    WBC 5.6 11/29/2021    HGB 9.1 (L) 02/14/2022    HGB 10.0 (L) 02/12/2022    HGB 13.9 02/11/2022    HCT 30.9 (L) 02/12/2022    HCT 41.8 02/11/2022    HCT 37.9 (L) 11/29/2021    MCV 89 02/12/2022    MCV 86 02/11/2022    MCV 91 11/29/2021     02/12/2022     02/11/2022     11/29/2021     Lab Results   Component Value Date    INR 2.09 (H) 02/14/2022    INR 2.14 (H) 02/13/2022    INR 2.48 (H) 02/12/2022

## 2022-02-14 NOTE — PLAN OF CARE
"  INPATIENT NOTE: [id number]    PRIMARY PROBLEM: Gross hematuria      /62 (BP Location: Right arm)   Pulse 71   Temp 98.4  F (36.9  C) (Oral)   Resp 16   Ht 1.753 m (5' 9\")   Wt 90.7 kg (200 lb)   SpO2 94%   BMI 29.53 kg/m         Orientation: Alert and Oriented x4  Neuro: Intact   Pain status: Pain is been well managed with PRN oxycodone,tramadol and Tylenol.   Resp: Lung sounds are Clear. Denies any SOB.   Cardiac: WNL, Denies any Chest Pain.   GI: Bowels are active x 4 Quads. Denies any constipation.  :3-grullon cathter. On Continues CBI going slow rate with normal saline most of the night. Urine is light pink.   Skin: WNL   LDA: Peripheral IV   Infusions: Saline Locked.  Pertinent Labs: INR 2.14  Diet: Regular  Activity: SBA with no assistive devices   Alarms/Safety: Bed Alarm  Consults:Urology   Discharge Plan: Home with Self care. Plan is to continue to monitor and provide supportive cares. Urology following.    Will continue to monitor and provide cares.     Lyndsey King RN        "

## 2022-02-14 NOTE — PLAN OF CARE
Care from 0700 - 1620:    A&Ox4, VSS on RA. Grullon catheter removed this AM per urology's recommendations. Pt voided x1 without measuring, bladder scanned for 538 ml after, then pt voided only 25 ml more. Discussed w/ Nicole DRAKE from urology, she instructed writer to replace grullon catheter and for pt to follow up with his primary urologist ASAP. When this writer informed pt of this plan, he adamantly refused another catheter being placed d/t 'all the trauma his penis has been through in the last week.' This writer explained the risks of not replacing the catheter, and risks were again reviewed w/ pt by Dr Gary; pt still refusing catheter replacement. Since that discussion, pt was able to void x2: 300 ml and 275 ml, but continued to have PVR's for >600. Pt signed AMA paperwork, reiterated to patient the significance of the bladder becoming too full and the importance of pt either returning to the ER if he experiences bladder fullness or discomfort, and/or following up with his primary urologist ASAP. Pt discharged via son.

## 2022-02-14 NOTE — PROGRESS NOTES
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     On 2/8/2022, the patient had a laser vaporization of the prostate secondary to BPH with urinary retention at [facility]. The patient had been bridged to Lovenox from his warfarin prior to the procedure. After the procedure the patient resumed his bridging back to warfarin with Lovenox. On Wednesday 2/9, the patient removed his Grullon catheter and was urinating without issue. On the evening of 2/10, he was unable to urinate and developed bladder discomfort. He called his urologist and was told to come to the emergency department.    Patient underwent cystoscopy with evacuation of bladder hematoma and fulguration of the bladder on 2/11 and was on CBI.  Now grullon removed but has urinary retention, currently refusing to have this replaced.    Problem List/Assessment and Plan:     Urinary Retention with Hematuria with recent photovaporization of prostate on 2/8 now s/p cystoscopy 2/11: Underwent Photovaporization of Prostate secondary to BPH on 2/8/2022 at [facility] with development of urinary retention prompting this admission.  Taken to OR on 2/11/2022 for Cystoscopy, Evacuation of Bladder Hematoma, and Fulguration of the Bladder.  Was on CBI, grullon catheter removed today but he now has post void  residual.  Urology recommending replacement of grullon catheter but he adamantly refuses this now. Spent extensive time discussing reasoning for this.  He needs to remain on anticoagulation given his underlying mechanical valves, very high risk for clot if not on anticoagulation.  - Urology following, appreciate recommendations  - Recommend replacement of grullon, patient refusing currently  - Need to continue Coumadin given mechanical MV and high risk for clot     Bicuspid Aortic Valve with Mixed Valve Disease s/p AVR with Metallic Valve in 1999  Mitral Leaflet Rupture s/p MVR with Mechanical MV on Coumadin in 1999  - Follows with [facility]  - Continue Coumadin, INR 2.09 today which is technically subtherapeutic but I will avoid giving a dose of Lovenox as he is near therapeutic range and still having bleeding     Acute Kidney Injury - Resolved: Baseline creatinine ~0.7, on presentation was elevated at 1.28.  This is in the setting of urinary retention, improved to 0.75  - Daily BMP     Mild Hypercalcemia  - Repeat BMP in AM     Hypertension  - Continue PTA Losartan, Amlodipine and Metoprolol    ABLA: Hgb was 13.9 on admission, decreased to 9.1. This is from hematuria/clots. No indication for transfusion, repeat tomorrow.     Chronic Medical Problems:  Dilated Thoracic Aorta - follows with [facility]  Hx of Infective Endocarditis of Mechanical MV in 11/2021  Dyslipidemia  Gout    Diet: Regular Diet Adult    DVT Prophylaxis: Warfarin  Grullon Catheter: Not present  Code Status: Full Code      Disposition Plan   Expected Discharge: 02/14/2022     Anticipated discharge location:  Awaiting care coordination huddle  Delays:on CBI    Entered: Kelly Gary MD 02/14/2022, 3:27 PM       The patient's care was discussed with the Bedside Nurse and Patient.    Hospitalist Service  [facility]          Interval History (Subjective):      Patient states he is feeling well today.  Hematuria  "improved.  Urology recommended catheter removal and discharge if he urinated.  He denies abdominal pain, nausea, emesis, CP or SOB.  Ambulating well.    I saw him this afternoon and discussed Urology recommendations for catheter placement.  Discussed extensively why this is recommended and what could happen if it was not replaced.  Still refusing to have this placed.                  Physical Exam:      Last Vital Signs:  BP (!) 145/60   Pulse 88   Temp 97.9  F (36.6  C) (Oral)   Resp 18   Ht 1.753 m (5' 9\")   Wt 90.7 kg (200 lb)   SpO2 100%   BMI 29.53 kg/m      General: Alert, awake, no acute distress.  HEENT: Normocephalic and atraumatic, eyes anicteric and without scleral injection, EOMI, face symmetric, MMM.  Cardiac: RRR, normal S1, S2. Clicks present at both mitral and aortic valve, no g/r, no LE edema.  Pulmonary: Normal chest rise, normal work of breathing.  Lungs CTAB without crackles or wheezing.  Abdomen: soft, non-tender, non-distended.  Normoactive bowel sounds, no guarding or rebound tenderness.  : Mohan in place, CBI running with pink-tinged colored urine but no clots  Extremities: no deformities.  Warm, well perfused.  Skin: no rashes or lesions.  Warm and Dry.  Neuro: No focal deficits.  Speech clear.  Coordination and strength grossly normal.  Psych: Alert and oriented x3. Appropriate affect.         Medications:      All current medications were reviewed with changes reflected in problem list.         Data:      All new lab and imaging data was reviewed.   Labs:  Recent Labs   Lab 02/14/22  0653 02/12/22  0602 02/11/22  1207    139 137   POTASSIUM 4.1 4.4 3.6   CHLORIDE 107 109 105   CO2 27 28 25   ANIONGAP 3 2* 7   * 108* 145*   BUN 12 17 20   CR 0.75 0.93 1.28*   GFRESTIMATED >90 84 57*   RICKI 9.3 8.8 10.4*     Recent Labs   Lab 02/14/22  0653 02/12/22  0602 02/11/22  1207   WBC  --  9.1 11.2*   HGB 9.1* 10.0* 13.9   HCT  --  30.9* 41.8   MCV  --  89 86   PLT  --  196 270 "     Recent Labs   Lab 02/14/22  0653 02/13/22  0531 02/12/22  0602   INR 2.09* 2.14* 2.48*      Imaging:   No results found for this or any previous visit (from the past 24 hour(s)).    Kelly Gary MD

## 2022-02-16 NOTE — PHARMACY-ANTICOAGULATION SERVICE
Clinical Pharmacy- Warfarin Discharge Note (note completed two days after patient was physically discharged AMA)    Warfarin PTA Regimen: 5 mg daily    Anticoagulation Dose History     Recent Dosing and Labs Latest Ref Rng & Units 11/10/2021 11/11/2021 11/12/2021 2/11/2022 2/12/2022 2/13/2022 2/14/2022    Warfarin 4 mg - 4 mg 4 mg - - - - -    Warfarin 5 mg - - - - - 5 mg 5 mg 5 mg    INR 0.85 - 1.15 3.27(H) 3.20(H) 2.95(H) 1.58(H) 2.48(H) 2.14(H) 2.09(H)        Per MD Discharge Summary from 2/14/22 (copied and pasted):  Bicuspid Aortic Valve with Mixed Valve Disease s/p AVR with Metallic Valve in 1999  Mitral Leaflet Rupture s/p MVR with Mechanical MV on Coumadin in 1999  - Follows with [facility]  - Continue Coumadin, INR 2.09 today which is technically subtherapeutic but I will avoid giving a dose of Lovenox as he is near therapeutic range and had significant bleeding related to hematuria.  Will continue PTA Coumadin dosing, should have INR checked Tuesday or Wednesday this week.    Discharge plan above seems reasonable.

## 2022-08-20 ENCOUNTER — HOSPITAL ENCOUNTER (EMERGENCY)
Facility: CLINIC | Age: 77
Discharge: HOME | End: 2022-08-21
Attending: EMERGENCY MEDICINE | Admitting: EMERGENCY MEDICINE
Payer: COMMERCIAL

## 2022-08-20 DIAGNOSIS — N99.840 POSTOPERATIVE HEMATOMA INVOLVING GENITOURINARY SYSTEM FOLLOWING GENITOURINARY PROCEDURE: ICD-10-CM

## 2022-08-20 PROCEDURE — 99283 EMERGENCY DEPT VISIT LOW MDM: CPT

## 2022-08-21 VITALS
TEMPERATURE: 98.1 F | DIASTOLIC BLOOD PRESSURE: 78 MMHG | HEART RATE: 72 BPM | OXYGEN SATURATION: 98 % | SYSTOLIC BLOOD PRESSURE: 132 MMHG | RESPIRATION RATE: 20 BRPM

## 2022-08-21 LAB
ANION GAP SERPL CALCULATED.3IONS-SCNC: 7 MMOL/L (ref 7–15)
BASOPHILS # BLD AUTO: 0 10E3/UL (ref 0–0.2)
BASOPHILS NFR BLD AUTO: 0 %
BUN SERPL-MCNC: 21.8 MG/DL (ref 8–23)
CALCIUM SERPL-MCNC: 9.2 MG/DL (ref 8.8–10.2)
CHLORIDE SERPL-SCNC: 106 MMOL/L (ref 98–107)
CREAT SERPL-MCNC: 1.21 MG/DL (ref 0.67–1.17)
DEPRECATED HCO3 PLAS-SCNC: 25 MMOL/L (ref 22–29)
EOSINOPHIL # BLD AUTO: 0.4 10E3/UL (ref 0–0.7)
EOSINOPHIL NFR BLD AUTO: 7 %
ERYTHROCYTE [DISTWIDTH] IN BLOOD BY AUTOMATED COUNT: 14.4 % (ref 10–15)
GFR SERPL CREATININE-BSD FRML MDRD: 61 ML/MIN/1.73M2
GLUCOSE SERPL-MCNC: 105 MG/DL (ref 70–99)
HCT VFR BLD AUTO: 31.4 % (ref 40–53)
HGB BLD-MCNC: 9.8 G/DL (ref 13.3–17.7)
IMM GRANULOCYTES # BLD: 0.1 10E3/UL
IMM GRANULOCYTES NFR BLD: 1 %
INR PPP: 1.92 (ref 0.85–1.15)
LYMPHOCYTES # BLD AUTO: 1.7 10E3/UL (ref 0.8–5.3)
LYMPHOCYTES NFR BLD AUTO: 28 %
MCH RBC QN AUTO: 28.9 PG (ref 26.5–33)
MCHC RBC AUTO-ENTMCNC: 31.2 G/DL (ref 31.5–36.5)
MCV RBC AUTO: 93 FL (ref 78–100)
MONOCYTES # BLD AUTO: 0.6 10E3/UL (ref 0–1.3)
MONOCYTES NFR BLD AUTO: 9 %
NEUTROPHILS # BLD AUTO: 3.3 10E3/UL (ref 1.6–8.3)
NEUTROPHILS NFR BLD AUTO: 55 %
NRBC # BLD AUTO: 0 10E3/UL
NRBC BLD AUTO-RTO: 0 /100
PLATELET # BLD AUTO: 185 10E3/UL (ref 150–450)
POTASSIUM SERPL-SCNC: 4.2 MMOL/L (ref 3.4–5.3)
RBC # BLD AUTO: 3.39 10E6/UL (ref 4.4–5.9)
SODIUM SERPL-SCNC: 138 MMOL/L (ref 136–145)
WBC # BLD AUTO: 6.1 10E3/UL (ref 4–11)

## 2022-08-21 PROCEDURE — 36415 COLL VENOUS BLD VENIPUNCTURE: CPT | Performed by: EMERGENCY MEDICINE

## 2022-08-21 PROCEDURE — 80048 BASIC METABOLIC PNL TOTAL CA: CPT | Performed by: EMERGENCY MEDICINE

## 2022-08-21 PROCEDURE — 85610 PROTHROMBIN TIME: CPT | Performed by: EMERGENCY MEDICINE

## 2022-08-21 PROCEDURE — 85014 HEMATOCRIT: CPT | Performed by: EMERGENCY MEDICINE

## 2022-08-21 ASSESSMENT — ENCOUNTER SYMPTOMS
DIFFICULTY URINATING: 0
DYSURIA: 0
FEVER: 0

## 2022-08-21 ASSESSMENT — ACTIVITIES OF DAILY LIVING (ADL): ADLS_ACUITY_SCORE: 33

## 2022-08-21 NOTE — ED TRIAGE NOTES
Pt arrives to ED with swelling to testicles and groin. Pt had a penile implant surgery on Tuesday. Pt is concerned because swelling is waxing and waning. Pt has hx heart valves and is on thinners, concerned for infection. Denies fevers. Oxy and tylenol for pain at home. Denies problems voiding.

## 2022-08-21 NOTE — DISCHARGE INSTRUCTIONS
Please return for expanding swelling pain fever abdominal pain.    Please use jockstrap/scrotal sling.  Try to avoid any excessive walking or lifting sit or lay when possible to help decrease hematoma.
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
## 2024-10-04 ENCOUNTER — HOSPITAL ENCOUNTER (OUTPATIENT)
Facility: CLINIC | Age: 79
Setting detail: OBSERVATION
Discharge: HOME | End: 2024-10-05
Attending: EMERGENCY MEDICINE | Admitting: INTERNAL MEDICINE
Payer: COMMERCIAL

## 2024-10-04 DIAGNOSIS — Z79.01 ANTICOAGULATED: ICD-10-CM

## 2024-10-04 DIAGNOSIS — R33.9 URINARY RETENTION: ICD-10-CM

## 2024-10-04 DIAGNOSIS — R31.0 GROSS HEMATURIA: ICD-10-CM

## 2024-10-04 DIAGNOSIS — Z95.2 H/O MITRAL VALVE REPLACEMENT WITH MECHANICAL VALVE: Primary | ICD-10-CM

## 2024-10-04 LAB
BASOPHILS # BLD AUTO: 0 10E3/UL (ref 0–0.2)
BASOPHILS NFR BLD AUTO: 0 %
EOSINOPHIL # BLD AUTO: 0.2 10E3/UL (ref 0–0.7)
EOSINOPHIL NFR BLD AUTO: 3 %
ERYTHROCYTE [DISTWIDTH] IN BLOOD BY AUTOMATED COUNT: 13 % (ref 10–15)
HCT VFR BLD AUTO: 37.2 % (ref 40–53)
HGB BLD-MCNC: 12.1 G/DL (ref 13.3–17.7)
IMM GRANULOCYTES # BLD: 0.1 10E3/UL
IMM GRANULOCYTES NFR BLD: 1 %
LYMPHOCYTES # BLD AUTO: 1.3 10E3/UL (ref 0.8–5.3)
LYMPHOCYTES NFR BLD AUTO: 19 %
MCH RBC QN AUTO: 28.9 PG (ref 26.5–33)
MCHC RBC AUTO-ENTMCNC: 32.5 G/DL (ref 31.5–36.5)
MCV RBC AUTO: 89 FL (ref 78–100)
MONOCYTES # BLD AUTO: 0.6 10E3/UL (ref 0–1.3)
MONOCYTES NFR BLD AUTO: 10 %
NEUTROPHILS # BLD AUTO: 4.6 10E3/UL (ref 1.6–8.3)
NEUTROPHILS NFR BLD AUTO: 67 %
NRBC # BLD AUTO: 0 10E3/UL
NRBC BLD AUTO-RTO: 0 /100
PLATELET # BLD AUTO: 218 10E3/UL (ref 150–450)
RBC # BLD AUTO: 4.18 10E6/UL (ref 4.4–5.9)
WBC # BLD AUTO: 6.8 10E3/UL (ref 4–11)

## 2024-10-04 PROCEDURE — 99285 EMERGENCY DEPT VISIT HI MDM: CPT

## 2024-10-04 PROCEDURE — 36415 COLL VENOUS BLD VENIPUNCTURE: CPT | Performed by: EMERGENCY MEDICINE

## 2024-10-04 PROCEDURE — 85025 COMPLETE CBC W/AUTO DIFF WBC: CPT | Performed by: EMERGENCY MEDICINE

## 2024-10-04 PROCEDURE — 99285 EMERGENCY DEPT VISIT HI MDM: CPT | Mod: 25

## 2024-10-04 PROCEDURE — 85610 PROTHROMBIN TIME: CPT | Performed by: EMERGENCY MEDICINE

## 2024-10-04 ASSESSMENT — COLUMBIA-SUICIDE SEVERITY RATING SCALE - C-SSRS
6. HAVE YOU EVER DONE ANYTHING, STARTED TO DO ANYTHING, OR PREPARED TO DO ANYTHING TO END YOUR LIFE?: NO
1. IN THE PAST MONTH, HAVE YOU WISHED YOU WERE DEAD OR WISHED YOU COULD GO TO SLEEP AND NOT WAKE UP?: NO
2. HAVE YOU ACTUALLY HAD ANY THOUGHTS OF KILLING YOURSELF IN THE PAST MONTH?: NO

## 2024-10-04 ASSESSMENT — ACTIVITIES OF DAILY LIVING (ADL): ADLS_ACUITY_SCORE: 37

## 2024-10-05 VITALS
BODY MASS INDEX: 30.35 KG/M2 | OXYGEN SATURATION: 97 % | SYSTOLIC BLOOD PRESSURE: 154 MMHG | WEIGHT: 204.9 LBS | TEMPERATURE: 97.7 F | HEART RATE: 67 BPM | DIASTOLIC BLOOD PRESSURE: 75 MMHG | RESPIRATION RATE: 19 BRPM | HEIGHT: 69 IN

## 2024-10-05 PROBLEM — Z79.01 ANTICOAGULATED: Status: ACTIVE | Noted: 2024-10-05

## 2024-10-05 PROBLEM — R33.9 URINARY RETENTION: Status: ACTIVE | Noted: 2024-10-05

## 2024-10-05 LAB
ANION GAP SERPL CALCULATED.3IONS-SCNC: 12 MMOL/L (ref 7–15)
BUN SERPL-MCNC: 24.1 MG/DL (ref 8–23)
CALCIUM SERPL-MCNC: 8.7 MG/DL (ref 8.8–10.4)
CHLORIDE SERPL-SCNC: 105 MMOL/L (ref 98–107)
CREAT SERPL-MCNC: 1.2 MG/DL (ref 0.67–1.17)
EGFRCR SERPLBLD CKD-EPI 2021: 61 ML/MIN/1.73M2
ERYTHROCYTE [DISTWIDTH] IN BLOOD BY AUTOMATED COUNT: 13 % (ref 10–15)
GLUCOSE SERPL-MCNC: 118 MG/DL (ref 70–99)
HCO3 SERPL-SCNC: 21 MMOL/L (ref 22–29)
HCT VFR BLD AUTO: 33.4 % (ref 40–53)
HGB BLD-MCNC: 10.9 G/DL (ref 13.3–17.7)
HGB BLD-MCNC: 10.9 G/DL (ref 13.3–17.7)
HOLD SPECIMEN: NORMAL
INR PPP: 4.14 (ref 0.85–1.15)
INR PPP: 4.49 (ref 0.85–1.15)
MCH RBC QN AUTO: 28.9 PG (ref 26.5–33)
MCHC RBC AUTO-ENTMCNC: 32.6 G/DL (ref 31.5–36.5)
MCV RBC AUTO: 89 FL (ref 78–100)
PLATELET # BLD AUTO: 196 10E3/UL (ref 150–450)
POTASSIUM SERPL-SCNC: 4.2 MMOL/L (ref 3.4–5.3)
RBC # BLD AUTO: 3.77 10E6/UL (ref 4.4–5.9)
SODIUM SERPL-SCNC: 138 MMOL/L (ref 135–145)
WBC # BLD AUTO: 6.4 10E3/UL (ref 4–11)

## 2024-10-05 PROCEDURE — 99214 OFFICE O/P EST MOD 30 MIN: CPT | Performed by: UROLOGY

## 2024-10-05 PROCEDURE — 85610 PROTHROMBIN TIME: CPT | Performed by: INTERNAL MEDICINE

## 2024-10-05 PROCEDURE — 85018 HEMOGLOBIN: CPT | Performed by: INTERNAL MEDICINE

## 2024-10-05 PROCEDURE — G0378 HOSPITAL OBSERVATION PER HR: HCPCS

## 2024-10-05 PROCEDURE — 80048 BASIC METABOLIC PNL TOTAL CA: CPT | Performed by: EMERGENCY MEDICINE

## 2024-10-05 PROCEDURE — 36415 COLL VENOUS BLD VENIPUNCTURE: CPT | Performed by: INTERNAL MEDICINE

## 2024-10-05 PROCEDURE — 85027 COMPLETE CBC AUTOMATED: CPT | Performed by: INTERNAL MEDICINE

## 2024-10-05 PROCEDURE — 250N000013 HC RX MED GY IP 250 OP 250 PS 637: Performed by: INTERNAL MEDICINE

## 2024-10-05 PROCEDURE — 99222 1ST HOSP IP/OBS MODERATE 55: CPT | Performed by: INTERNAL MEDICINE

## 2024-10-05 PROCEDURE — 258N000003 HC RX IP 258 OP 636: Performed by: INTERNAL MEDICINE

## 2024-10-05 PROCEDURE — 250N000013 HC RX MED GY IP 250 OP 250 PS 637: Performed by: EMERGENCY MEDICINE

## 2024-10-05 PROCEDURE — 36415 COLL VENOUS BLD VENIPUNCTURE: CPT | Performed by: EMERGENCY MEDICINE

## 2024-10-05 RX ORDER — ONDANSETRON 2 MG/ML
4 INJECTION INTRAMUSCULAR; INTRAVENOUS EVERY 6 HOURS PRN
Status: DISCONTINUED | OUTPATIENT
Start: 2024-10-05 | End: 2024-10-05 | Stop reason: HOSPADM

## 2024-10-05 RX ORDER — WARFARIN SODIUM 5 MG/1
7.5 TABLET ORAL DAILY
Status: SHIPPED
Start: 2024-10-05 | End: 2024-10-31

## 2024-10-05 RX ORDER — PROCHLORPERAZINE MALEATE 5 MG/1
5 TABLET ORAL EVERY 6 HOURS PRN
Status: DISCONTINUED | OUTPATIENT
Start: 2024-10-05 | End: 2024-10-05 | Stop reason: HOSPADM

## 2024-10-05 RX ORDER — ACETAMINOPHEN 650 MG/1
650 SUPPOSITORY RECTAL EVERY 4 HOURS PRN
Status: DISCONTINUED | OUTPATIENT
Start: 2024-10-05 | End: 2024-10-05 | Stop reason: HOSPADM

## 2024-10-05 RX ORDER — PROCHLORPERAZINE 25 MG
12.5 SUPPOSITORY, RECTAL RECTAL EVERY 12 HOURS PRN
Status: DISCONTINUED | OUTPATIENT
Start: 2024-10-05 | End: 2024-10-05 | Stop reason: HOSPADM

## 2024-10-05 RX ORDER — ACETAMINOPHEN 325 MG/1
650 TABLET ORAL EVERY 4 HOURS PRN
Status: DISCONTINUED | OUTPATIENT
Start: 2024-10-05 | End: 2024-10-05 | Stop reason: HOSPADM

## 2024-10-05 RX ORDER — LOSARTAN POTASSIUM 50 MG/1
50 TABLET ORAL DAILY
Status: DISCONTINUED | OUTPATIENT
Start: 2024-10-05 | End: 2024-10-05 | Stop reason: HOSPADM

## 2024-10-05 RX ORDER — AMOXICILLIN 250 MG
2 CAPSULE ORAL 2 TIMES DAILY PRN
Status: DISCONTINUED | OUTPATIENT
Start: 2024-10-05 | End: 2024-10-05 | Stop reason: HOSPADM

## 2024-10-05 RX ORDER — SODIUM CHLORIDE 9 MG/ML
INJECTION, SOLUTION INTRAVENOUS CONTINUOUS
Status: ACTIVE | OUTPATIENT
Start: 2024-10-05 | End: 2024-10-05

## 2024-10-05 RX ORDER — ONDANSETRON 4 MG/1
4 TABLET, ORALLY DISINTEGRATING ORAL EVERY 6 HOURS PRN
Status: DISCONTINUED | OUTPATIENT
Start: 2024-10-05 | End: 2024-10-05 | Stop reason: HOSPADM

## 2024-10-05 RX ORDER — POLYETHYLENE GLYCOL 3350 17 G/17G
17 POWDER, FOR SOLUTION ORAL 2 TIMES DAILY PRN
Status: DISCONTINUED | OUTPATIENT
Start: 2024-10-05 | End: 2024-10-05 | Stop reason: HOSPADM

## 2024-10-05 RX ORDER — AMOXICILLIN 500 MG/1
500 CAPSULE ORAL ONCE
Status: COMPLETED | OUTPATIENT
Start: 2024-10-05 | End: 2024-10-05

## 2024-10-05 RX ORDER — WARFARIN SODIUM 5 MG/1
TABLET ORAL DAILY
Status: ON HOLD | COMMUNITY
End: 2024-10-05

## 2024-10-05 RX ORDER — AMOXICILLIN 250 MG
1 CAPSULE ORAL 2 TIMES DAILY PRN
Status: DISCONTINUED | OUTPATIENT
Start: 2024-10-05 | End: 2024-10-05 | Stop reason: HOSPADM

## 2024-10-05 RX ADMIN — AMOXICILLIN 500 MG: 500 CAPSULE ORAL at 02:48

## 2024-10-05 RX ADMIN — ACETAMINOPHEN 325MG 650 MG: 325 TABLET ORAL at 09:19

## 2024-10-05 RX ADMIN — ACETAMINOPHEN 325MG 650 MG: 325 TABLET ORAL at 05:11

## 2024-10-05 RX ADMIN — SODIUM CHLORIDE: 9 INJECTION, SOLUTION INTRAVENOUS at 06:03

## 2024-10-05 ASSESSMENT — ACTIVITIES OF DAILY LIVING (ADL)
ADLS_ACUITY_SCORE: 20
ADLS_ACUITY_SCORE: 37
ADLS_ACUITY_SCORE: 20
ADLS_ACUITY_SCORE: 37
ADLS_ACUITY_SCORE: 37
ADLS_ACUITY_SCORE: 20
ADLS_ACUITY_SCORE: 37
ADLS_ACUITY_SCORE: 20

## 2024-10-05 NOTE — PHARMACY-ADMISSION MEDICATION HISTORY
Pharmacist Admission Medication History    Admission medication history is complete. The information provided in this note is only as accurate as the sources available at the time of the update.    Information Source(s): Patient and CareEverywhere/SureScripts via in-person    Pertinent Information: recent INR with dose change - per patient doesn't really have set days yet for the higher dose of warfarin 2 days per week    Changes made to PTA medication list:  Added: otc supplements  Deleted: tylenol, norvasc, metoprolol, lovenox, lipitor  Changed: warfarin    Allergies reviewed with patient and updates made in EHR: yes'    Medication History Completed By: Ludy Garcia [facility] 10/5/2024 11:24 AM    PTA Med List   Medication Sig Last Dose    BEE POLLEN PO Take by mouth daily. 10/4/2024    Cyanocobalamin (VITAMIN B-12 PO) Take by mouth daily. 10/4/2024    Ginkgo Biloba (GINKOBA PO) Take by mouth daily. 10/4/2024    Green Tea, Aurora sinensis, (GREEN TEA PO) Take by mouth daily. 10/4/2024    losartan (COZAAR) 50 MG tablet Take 50 mg by mouth daily. 10/4/2024    Omega-3 Fatty Acids (FISH OIL OMEGA-3 PO) Take by mouth daily. 10/4/2024    [place] JOHNS WORT PO Take by mouth daily. 10/4/2024    warfarin ANTICOAGULANT (COUMADIN) 5 MG tablet Take by mouth daily. Take 7.5 mg - five times weekly  10 mg  twice  a week 10/4/2024

## 2024-10-05 NOTE — CONSULTS
Urology Consult History and Physical    Name: Celia Stallings    MRN: [id number]   YOB: 1945       We were asked to see Celia Stallings at the request of Dr. Whitten for evaluation and treatment of gross hematuria.          Chief Complaint:   Gross hematuria     History is obtained from the patient          History of Present Illness:   Celia Stallings is a 79 year old male with past medical history of urethral stricture and prior trans urethral prostate surgery who performs intermittent self-catheterization 1-2 times daily and follows at [facility] with Dr. Wilcox with his last visit on 8/20/2024 with a cystoscopy at that time with evidence of stricture and was able to pass a 14 [place] catheter.  Unclear long-term plan for the stricture.  He has a history of a valve repair and presented with gross hematuria following self-catheterization attempt with an INR above 4.           Past Medical History:     Past Medical History:   Diagnosis Date    Atrial fibrillation (H)     Atrial flutter (H)     BPH (benign prostatic hyperplasia)     Discitis of lumbar region 11/06/2021    At time enterococcal sepsis    Endocarditis     2021    Enterococcal sepsis (H) 11/06/2021    E faecalis, presumed PVE    H/O mechanical aortic valve replacement 1997    H/O mitral valve replacement with mechanical valve 1999    HLD (hyperlipidemia)     HTN (hypertension)     Infective endocarditis of [facility] mitral valve 11/05/2021    Kidney stones     Urethral stricture             Past Surgical History:     Past Surgical History:   Procedure Laterality Date    CYSTOSCOPY N/A 2/11/2022    Procedure: Cystoscopy, evacuation of bladder hematoma, fulguration of the prostate;  Surgeon: Tonny Lacy MD;  Location:  OR            Social History:     Social History     Tobacco Use    Smoking status: Not on file    Smokeless tobacco: Not on file   Substance Use Topics    Alcohol use: Not on file            Family  History:   No family history on file.           Allergies:     Allergies   Allergen Reactions    Lisinopril Cough            Medications:     Current Facility-Administered Medications   Medication Dose Route Frequency Provider Last Rate Last Admin    acetaminophen (TYLENOL) tablet 650 mg  650 mg Oral Q4H PRN Rick Whitten MD   650 mg at 10/05/24 0919    Or    acetaminophen (TYLENOL) Suppository 650 mg  650 mg Rectal Q4H PRN Rick Whitten MD        losartan (COZAAR) tablet 50 mg  50 mg Oral Daily Rick Whitten MD        melatonin tablet 1 mg  1 mg Oral At Bedtime PRN Rick Whitten MD        ondansetron (ZOFRAN ODT) ODT tab 4 mg  4 mg Oral Q6H PRN Rick Whitten MD        Or    ondansetron (ZOFRAN) injection 4 mg  4 mg Intravenous Q6H PRN Rick Whitten MD        Patient is already receiving anticoagulation with heparin, enoxaparin (LOVENOX), warfarin (COUMADIN)  or other anticoagulant medication   Does not apply Continuous PRN Rick Whitten MD        polyethylene glycol (MIRALAX) Packet 17 g  17 g Oral BID PRN Rick Whitten MD        prochlorperazine (COMPAZINE) injection 5 mg  5 mg Intravenous Q6H PRN Rick Whitten MD        Or    prochlorperazine (COMPAZINE) tablet 5 mg  5 mg Oral Q6H PRN Rick Whitten MD        Or    prochlorperazine (COMPAZINE) suppository 12.5 mg  12.5 mg Rectal Q12H PRN Rick Whitten MD        senna-docusate (SENOKOT-S/PERICOLACE) 8.6-50 MG per tablet 1 tablet  1 tablet Oral BID PRN Rick Whitten MD        Or    senna-docusate (SENOKOT-S/PERICOLACE) 8.6-50 MG per tablet 2 tablet  2 tablet Oral BID PRN Rick Whitten MD        sodium chloride 0.9 % infusion   Intravenous Continuous Rick Whitten  mL/hr at 10/05/24 0948 Rate Verify at 10/05/24 0948             Review of Systems:    ROS: 10 point ROS neg other than the symptoms noted above in the HPI.          Physical  "Exam:   Patient Vitals for the past 24 hrs:   BP Temp Temp src Pulse Resp SpO2 Height Weight   10/05/24 1120 (!) 154/75 97.7  F (36.5  C) Oral 67 19 97 % -- --   10/05/24 0748 (!) 150/79 98  F (36.7  C) Oral 96 19 -- -- --   10/05/24 0442 -- 97.4  F (36.3  C) Oral -- 20 -- -- 92.9 kg (204 lb 14.4 oz)   10/05/24 0340 139/72 -- -- 72 -- 98 % -- --   10/04/24 2213 (!) 171/76 97  F (36.1  C) Temporal 83 20 97 % 1.753 m (5' 9\") 96.1 kg (211 lb 13.8 oz)     General: age-appropriate appearing male in NAD  : Mohan catheter in place draining light claire urine            Data:   All laboratory data reviewed:    Recent Labs   Lab 10/05/24  0548 10/04/24  2304   WBC 6.4 6.8   HGB 10.9* 12.1*    218       Recent Labs   Lab 10/05/24  0321      POTASSIUM 4.2   CHLORIDE 105   CO2 21*   BUN 24.1*   CR 1.20*   *   RICKI 8.7*     No lab results found in last 7 days.    Invalid input(s): \"URINEBLOOD\"             Impression and Plan:   Impression:   79-year-old man with history of BPH status post greenlight PVP and meatal dilation in February 2022 with evidence of urethral stricture, history of valve replacement and on warfarin who presents with gross hematuria      Plan:   Gross hematuria  - A 16 [place] Mohan catheter was placed in the emergency room overnight  - I irrigated this with 500 cc normal saline with return of a few small blood clots, no evidence of ongoing bleeding  - No plan for operative intervention today and okay for a diet  - Plan for discharge to home with Mohan catheter in place and he may follow-up with his primary urologist     Ti Quinonez MD   Urology  [facility]  Clinic Phone [phone]         "

## 2024-10-05 NOTE — H&P
[facility]  Hospitalist Admission Note  Name: Celia Stallings    MRN: [id number]  YOB: 1945    Age: 79 year old  Date of admission: 10/4/2024  Primary care provider: Hilton Rebolledo    Chief Complaint:  bloody urine, retention    Assessment and Plan:   Acute hematuria  Acute urinary retention  History urethral stricture  BPH: He has been doing intermittent straight cath once per day for the past month per his urologist recommendations.  History of meatal dilation in 2022 and balloon dilation for urethral stricture earlier this year.  Earlier in the evening he did his routine straight cath and then developed bleeding.  After that he had clots and retention prompting him to go to the ER.  He has a supratherapeutic INR of 4.1 secondary to his warfarin likely leading to the bleeding.  In the ER they were able to place a Mohan catheter, but not a three-way catheter likely due to his urethral stenosis.  He was hand irrigated with 1 L saline and small clots and dark blood came out.  -Consult urology, keep n.p.o., anticipate may need cystoscopy with three-way catheter placement and continuous bladder irrigation  -Holding warfarin, but should not reverse in this setting given his mechanical aortic and mitral valves in addition to A-fib/flutter  -Repeat CBC in the morning and hemoglobin at noon  -Unclear why he is not on Flomax, perhaps this could help, defer to urology  -Monitor for retention overnight including using bladder scan and hand irrigate with normal saline if needed    Mechanical aortic valve replacement  Mechanical mitral valve replacement  A-fib, a flutter  Supratherapeutic INR  HTN  History endocarditis: History mechanical aortic valve replacement in 1997 and mechanical mitral valve replacement in 1999.  History of endocarditis in 2021.  He has A-fib, a flutter and is chronically on warfarin with goal INR of 3 per patient.  Supratherapeutic INR at 4.14 on admission.  He normally  takes warfarin in the morning with last dose 10/4.  His pulse is irregular on exam so likely in A-fib.  He is also on losartan 50 mg daily.  -Hold warfarin today, daily INR  -Do not recommend reversing INR secondary to significantly stroke risk in the setting of a mechanical mitral valve replacement  -Resume PTA losartan 50 mg daily    HLD: Says he no longer takes atorvastatin.    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  -Give 1 L NS over 10 hours, he will be n.p.o. initially  -BMP tomorrow morning    Lower extremity edema: He has 1-2+ bilateral lower extremity pitting edema on exam.  He says the swelling is newer, but cannot give me a timeline.  Denies any exertional dyspnea, chest pain, orthopnea.  I do not hear any loud murmur on exam.  -Recommend he follow-up with PCP if not worsening and consider outpatient TTE    DVT Prophylaxis: Warfarin  Code Status: Full Code  FEN: Give 1 L NS over 10 hours, he will be n.p.o. initially  Discharge Dispo: Home  Estimated Disch Date / # of Days until Disch: Admit observation for persistent hematuria and urinary retention.  Anticipate discharge within 48 hours if hematuria resolves.      History of Present Illness:  Celia Stallings is a 79 year old male with PMH including mechanical aortic and mitral valve replacements on warfarin, A-fib/flutter, HTN, HLD, endocarditis, nephrolithiasis, BPH, and urethral stricture requiring balloon dilation earlier this year ho presents with hematuria.  He has been following with his urologist through [facility].  He has been doing straight cath once per day for the last 1 month due to retention issues at the advice of his urologist.  Occasionally has a little bit of bleeding, but it normally resolves.  Earlier in the evening he did his routine straight catheterization and then he had dark red blood in the urine.  He has subsequently developed clots and urinary retention prompting him to seek evaluation.  He does  "take warfarin daily with goal INR 3, last dose morning of 10/4.  He denies any fevers, chills, nausea, vomiting, diarrhea, dysuria prior to the straight catheterization.    History obtained from patient, medical record, and from Dr. Woods in the emergency department.  Blood pressure 171/76, heart 83, temperature 97  F, oxygen 97% on room air.  Initial labs showed WBC 6.8, hemoglobin 12.1, platelet count 218.  Creatinine up slightly at 1.20, BUN 24, bicarb 21.  Glucose 118.  INR supratherapeutic at 4.14.  Unable to place a three-way catheter presumably from urethral stenosis.  Mohan catheter was placed.  He did undergo 1 L NS hand irrigation resulting in blood and clots coming out.  Admit to observation with urology consultation.         Clinically Significant Risk Factors Present on Admission               # Drug Induced Coagulation Defect: home medication list includes an anticoagulant medication    # Hypertension: Noted on problem list         # Obesity: Estimated body mass index is 30.26 kg/m  as calculated from the following:    Height as of this encounter: 1.753 m (5' 9\").    Weight as of this encounter: 92.9 kg (204 lb 14.4 oz).                        Past Medical History reviewed:  Past Medical History:   Diagnosis Date    Atrial fibrillation (H)     Atrial flutter (H)     BPH (benign prostatic hyperplasia)     Discitis of lumbar region 11/06/2021    At time enterococcal sepsis    Endocarditis     2021    Enterococcal sepsis (H) 11/06/2021    E faecalis, presumed PVE    H/O mechanical aortic valve replacement 1997    H/O mitral valve replacement with mechanical valve 1999    HLD (hyperlipidemia)     HTN (hypertension)     Infective endocarditis of [facility] mitral valve 11/05/2021    Kidney stones     Urethral stricture      Past Surgical History reviewed:  Past Surgical History:   Procedure Laterality Date    CYSTOSCOPY N/A 2/11/2022    Procedure: Cystoscopy, evacuation of bladder hematoma, fulguration of the " "prostate;  Surgeon: Tonny Lacy MD;  Location: [facility] OR     Social History reviewed:  He does not smoke    Family History reviewed:  Reviewed chart and noncontributory to this admission  Allergies:  Allergies   Allergen Reactions    Lisinopril Cough     Medications:  Warfarin every morning  Losartan 50 mg daily    Review of Systems:  A Comprehensive greater than 10 system review of systems was carried out.  Pertinent positives and negatives are noted above.  Otherwise negative.     Physical Exam:  Blood pressure 139/72, pulse 72, temperature 97.4  F (36.3  C), temperature source Oral, resp. rate 20, height 1.753 m (5' 9\"), weight 92.9 kg (204 lb 14.4 oz), SpO2 98%.  Wt Readings from Last 1 Encounters:   10/05/24 92.9 kg (204 lb 14.4 oz)     Exam:  Constitutional: Awake, NAD   Eyes: sclera white   HEENT:  MMM  Respiratory: no respiratory distress, lungs cta bilaterally, no crackles or wheeze  Cardiovascular: Irregular, regular rhythm, loud S1 and S2, no murmur  GI: Slight suprapubic tender discomfort to palpation, soft and not distended, bowel sounds present  Genitourinary: Mohan catheter present with dark red urine in the bag  Skin: no rash  Musculoskeletal/extremities: 1-2+ bilateral lower extremity pitting edema  Neurologic: A&O, speech clear  Psychiatric: calm, cooperative, normal affect    Lab and imaging data personally reviewed:  Labs:  Recent Labs   Lab 10/04/24  2304   WBC 6.8   HGB 12.1*   HCT 37.2*   MCV 89        Recent Labs   Lab 10/05/24  0321      POTASSIUM 4.2   CHLORIDE 105   CO2 21*   ANIONGAP 12   *   BUN 24.1*   CR 1.20*   GFRESTIMATED 61   RICKI 8.7*     Recent Labs   Lab 10/04/24  2304   INR 4.14*       Imaging:  None obtained    Rick Whitten MD  Hospitalist  [facility]    "

## 2024-10-05 NOTE — PLAN OF CARE
"Assumed Pt's Care at 0435hrs    Pt is alert and oriented. PRN Tylenol administered to manage pain. Pt denies any shortness of breathe and on room air.    Mohan is patent with dark red output. No clots observed.    IV NS infusing ar 100 ml/hr.    Pt ambulate SBA from stretcher into room. Ongoing monitoring.    Plan: Urology consult.    /72   Pulse 72   Temp 97.4  F (36.3  C) (Oral)   Resp 20   Ht 1.753 m (5' 9\")   Wt 92.9 kg (204 lb 14.4 oz)   SpO2 98%   BMI 30.26 kg/m       Problem: Adult Inpatient Plan of Care  Goal: Plan of Care Review  Description: The Plan of Care Review/Shift note should be completed every shift.  The Outcome Evaluation is a brief statement about your assessment that the patient is improving, declining, or no change.  This information will be displayed automatically on your shift  note.  Outcome: Progressing  Flowsheets (Taken 10/5/2024 0558)  Outcome Evaluation: Mohan patent with dark red output. No clots observed.  Plan of Care Reviewed With: patient  Overall Patient Progress: no change  Goal: Patient-Specific Goal (Individualized)  Description: You can add care plan individualizations to a care plan. Examples of Individualization might be:  \"Parent requests to be called daily at 9am for status\", \"I have a hard time hearing out of my right ear\", or \"Do not touch me to wake me up as it startles  me\".  Outcome: Progressing  Goal: Absence of Hospital-Acquired Illness or Injury  Outcome: Progressing  Intervention: Identify and Manage Fall Risk  Recent Flowsheet Documentation  Taken 10/5/2024 0511 by Ericka Rubin RN  Safety Promotion/Fall Prevention:   safety round/check completed   patient and family education   lighting adjusted   activity supervised  Intervention: Prevent and Manage VTE (Venous Thromboembolism) Risk  Recent Flowsheet Documentation  Taken 10/5/2024 0511 by Ericka Rubin RN  VTE Prevention/Management: SCDs off (sequential compression devices)  Intervention: " Prevent Infection  Recent Flowsheet Documentation  Taken 10/5/2024 0511 by Ericka Rubin RN  Infection Prevention:   single patient room provided   rest/sleep promoted   hand hygiene promoted  Goal: Optimal Comfort and Wellbeing  Outcome: Progressing  Intervention: Monitor Pain and Promote Comfort  Recent Flowsheet Documentation  Taken 10/5/2024 0511 by Ericka Rubin RN  Pain Management Interventions: (pillow under knee)   medication (see MAR)   other (see comments)  Goal: Readiness for Transition of Care  Outcome: Progressing  Intervention: Mutually Develop Transition Plan  Recent Flowsheet Documentation  Taken 10/5/2024 0453 by Ericka Rubin RN  Equipment Currently Used at Home: none   Goal Outcome Evaluation:      Plan of Care Reviewed With: patient    Overall Patient Progress: no changeOverall Patient Progress: no change    Outcome Evaluation: Mohan patent with dark red output. No clots observed.

## 2024-10-05 NOTE — ED NOTES
Pt reported significant amount of pain and pressure around the penis area. Obly around 50ml of bladder output in drainage back. Pt attempted to stand up, only small amount of blood drained around the urethra. Attempted to irrigated with 50ml of sterile NS, about 400ml of bladder output immediately returned in the bag, bright red with numerous clots. Pt reported pain and pressure improvement.

## 2024-10-05 NOTE — PROGRESS NOTES
"  A&O x4  Denies pain, SoB, N/V    Discharge pt with Mohan in place  Pt discharged at 1614, Warfarin instructions given to pt to take 5mg tonight, 7.5mg tomorrow and on Monday(10.7) have INR checked.  Pt stated understanding and transported himself home.     BP (!) 154/75 (BP Location: Right arm)   Pulse 67   Temp 97.7  F (36.5  C) (Oral)   Resp 19   Ht 1.753 m (5' 9\")   Wt 92.9 kg (204 lb 14.4 oz)   SpO2 97%   BMI 30.26 kg/m      "

## 2024-10-05 NOTE — ED TRIAGE NOTES
Pt normally caths himself a few times a day to make sure his bladder empties, otherwise he urinates on his own. Pt reports after he catheterized himself at noon he started having bleeding. Pt reports he is having some retention and has noticed some clots. Pt take coumadin.

## 2024-10-05 NOTE — ED PROVIDER NOTES
"    History     Chief Complaint:  Hematuria, urinary retention       HPI   Celia Stallings is a 79 year old male with history of A-fib/a flutter on warfarin, history of aortic valve replacement with mechanical valve in 1997, MVR 1999, bacterial endocarditis 2021, kidney stones, BPH and meatal dilation 2022, and urethral stricture disease status balloon dilation 2024, who presents with hematuria and urinary hesitancy.  Patient notes that he typically self caths at night and occasionally will see some hematuria.  This evening, he self catheterized and some more blood than usual.  Since then, he has had difficulty emptying his bladder and has been urinating frequently.  He denies fevers, chills, nausea, vomiting, abdominal pain, or other concerns.    Of note, while in the ED, the patient notes that he can no longer urinate at all and had been observing gross hematuria.    Independent Historian:   None    Review of External Notes:   Reviewed urology visit from 8/20/2024 regarding the above urologic history    ROS:  Review of Systems    Allergies:  Lisinopril     Medications:    acetaminophen (TYLENOL) 325 MG tablet  amLODIPine (NORVASC) 2.5 MG tablet  atorvastatin (LIPITOR) 20 MG tablet  enoxaparin ANTICOAGULANT (LOVENOX) 100 MG/ML syringe  losartan (COZAAR) 100 MG tablet  metoprolol succinate ER (TOPROL-XL) 25 MG 24 hr tablet  warfarin ANTICOAGULANT (COUMADIN) 5 MG tablet        Past History:    Past Medical History:   Diagnosis Date    Discitis of lumbar region 11/06/2021    Enterococcal sepsis (H) 11/06/2021    Heart valve replaced     Infective endocarditis of [facility] mitral valve 11/05/2021    Kidney stones          Physical Exam     Patient Vitals for the past 24 hrs:  Vitals:    10/04/24 2213 10/05/24 0340   BP: (!) 171/76 139/72   Pulse: 83 72   Resp: 20    Temp: 97  F (36.1  C)    TempSrc: Temporal    SpO2: 97% 98%   Weight: 96.1 kg (211 lb 13.8 oz)    Height: 1.753 m (5' 9\")          Physical " Exam  Constitutional: Alert, attentive  HENT:    Nose: Nose normal.    Mouth/Throat: Oropharynx is clear, mucous membranes are moist   Eyes: EOM are normal.   CV: regular rate and rhythm; no murmurs, rubs or gallups  Chest: Effort normal and breath sounds normal.   GI:  There is no tenderness. No distension. Normal bowel sounds  : Normal external genitalia, blood tinged urine at the meatus  MSK: Normal range of motion.   Neurological: Alert, attentive  Skin: Skin is warm and dry.      Emergency Department Course       Results for orders placed or performed during the hospital encounter of 10/04/24   INR     Status: Abnormal   Result Value Ref Range    INR 4.14 (H) 0.85 - 1.15   CBC with platelets and differential     Status: Abnormal   Result Value Ref Range    WBC Count 6.8 4.0 - 11.0 10e3/uL    RBC Count 4.18 (L) 4.40 - 5.90 10e6/uL    Hemoglobin 12.1 (L) 13.3 - 17.7 g/dL    Hematocrit 37.2 (L) 40.0 - 53.0 %    MCV 89 78 - 100 fL    MCH 28.9 26.5 - 33.0 pg    MCHC 32.5 31.5 - 36.5 g/dL    RDW 13.0 10.0 - 15.0 %    Platelet Count 218 150 - 450 10e3/uL    % Neutrophils 67 %    % Lymphocytes 19 %    % Monocytes 10 %    % Eosinophils 3 %    % Basophils 0 %    % Immature Granulocytes 1 %    NRBCs per 100 WBC 0 <1 /100    Absolute Neutrophils 4.6 1.6 - 8.3 10e3/uL    Absolute Lymphocytes 1.3 0.8 - 5.3 10e3/uL    Absolute Monocytes 0.6 0.0 - 1.3 10e3/uL    Absolute Eosinophils 0.2 0.0 - 0.7 10e3/uL    Absolute Basophils 0.0 0.0 - 0.2 10e3/uL    Absolute Immature Granulocytes 0.1 <=0.4 10e3/uL    Absolute NRBCs 0.0 10e3/uL   Basic metabolic panel (BMP)     Status: Abnormal   Result Value Ref Range    Sodium 138 135 - 145 mmol/L    Potassium 4.2 3.4 - 5.3 mmol/L    Chloride 105 98 - 107 mmol/L    Carbon Dioxide (CO2) 21 (L) 22 - 29 mmol/L    Anion Gap 12 7 - 15 mmol/L    Urea Nitrogen 24.1 (H) 8.0 - 23.0 mg/dL    Creatinine 1.20 (H) 0.67 - 1.17 mg/dL    GFR Estimate 61 >60 mL/min/1.73m2    Calcium 8.7 (L) 8.8 - 10.4  mg/dL    Glucose 118 (H) 70 - 99 mg/dL   CBC + differential     Status: Abnormal    Narrative    The following orders were created for panel order CBC + differential.  Procedure                               Abnormality         Status                     ---------                               -----------         ------                     CBC with platelets and d...[[id number]]  Abnormal            Final result                 Please view results for these tests on the individual orders.       Emergency Department Course & Assessments:             Interventions:  Attempted three-way catheter placement using multiple size catheters, meeting resistance regardless of size    Consultations/Discussion of Management or Tests:  Discussed case with urology.  Placed a smaller bore coudé catheter with return of gross hematuria that clears to moderate but does not completely clear.    Discussed the case with Dr. Whitten, hospitalist service.    Disposition:  The patient was admitted to the hospital under the care of Dr. Whitten.     Impression & Plan        Medical Decision Making:  This is an 79-year-old male with history of complex urology history as described above, most notably including strictures at the meatus and proximal urethra is status post balloon dilation this year, who presents for evaluation of gross hematuria and urinary retention.  Attempts to place three-way catheter fortunately unsuccessful despite use of multiple potential catheter sizes.  Suspect this is due to underlying urethral stricture.  Trauma from the patient self catheterizing cannot be ruled out.  In consultation with urology, smaller bore coudé catheter was placed with at least resolution of urinary retention, but urine remains with gross hematuria.  Renal function and hematologic parameters are reassuring.  He is not a candidate for reversal of anticoagulation given mechanical valve.  Given catheter placement and history of mechanical valve  endocarditis, amoxicillin prophylaxis was administered.  Patient will be admitted observation status with plan for urology consultation and likely cystoscopy guided three-way catheter placement.     Diagnosis:  Visit Diagnosis, Associated Orders, and Comments     ICD-10-CM    1. Gross hematuria  R31.0       2. Urinary retention  R33.9       3. Anticoagulated  Z79.01            Inder Woods MD  10/05/24 0417

## 2024-10-05 NOTE — ED NOTES
"[facility]  ED Nurse Handoff Report    ED Chief complaint: Hematuria  . ED Diagnosis:   Final diagnoses:   None       Allergies:   Allergies   Allergen Reactions    Lisinopril Cough       Code Status: Full Code    Activity level - Baseline/Home:  independent.  Activity Level - Current:   independent.   Lift room needed: No.   Bariatric: No   Needed: No   Isolation: No.   Infection: Not Applicable.     Respiratory status: Room air    Vital Signs (within 30 minutes):   Vitals:    10/04/24 2213   BP: (!) 171/76   Pulse: 83   Resp: 20   Temp: 97  F (36.1  C)   TempSrc: Temporal   SpO2: 97%   Weight: 96.1 kg (211 lb 13.8 oz)   Height: 1.753 m (5' 9\")       Cardiac Rhythm:  ,      Pain level:    Patient confused: No.   Patient Falls Risk: patient and family education.   Elimination Status: Urethral catheter (grullon) in place; refer to orders to discontinue as per protocol      Patient Report - Initial Complaint: hematuria.   Focused Assessment: Celia Stallings is a 79 year old male with history of A-fib/a flutter on warfarin, history of aortic valve replacement with mechanical valve in 1997, MVR 1999, bacterial endocarditis 2021, kidney stones, BPH and meatal dilation 2022, and urethral stricture disease status balloon dilation 2024, who presents with hematuria and urinary hesitancy.  Patient notes that he typically self caths at night and occasionally will see some hematuria.  This evening, he self catheterized and some more blood than usual.  Since then, he has had difficulty emptying his bladder and has been urinating frequently.  He denies fevers, chills, nausea, vomiting, abdominal pain, or other concerns.     Abnormal Results:   Labs Ordered and Resulted from Time of ED Arrival to Time of ED Departure   INR - Abnormal       Result Value    INR 4.14 (*)    CBC WITH PLATELETS AND DIFFERENTIAL - Abnormal    WBC Count 6.8      RBC Count 4.18 (*)     Hemoglobin 12.1 (*)     Hematocrit " 37.2 (*)     MCV 89      MCH 28.9      MCHC 32.5      RDW 13.0      Platelet Count 218      % Neutrophils 67      % Lymphocytes 19      % Monocytes 10      % Eosinophils 3      % Basophils 0      % Immature Granulocytes 1      NRBCs per 100 WBC 0      Absolute Neutrophils 4.6      Absolute Lymphocytes 1.3      Absolute Monocytes 0.6      Absolute Eosinophils 0.2      Absolute Basophils 0.0      Absolute Immature Granulocytes 0.1      Absolute NRBCs 0.0     BASIC METABOLIC PANEL        No orders to display       Treatments provided: grullon  Family Comments: NA  OBS brochure/video discussed/provided to patient:  No  ED Medications:   Medications   amoxicillin (AMOXIL) capsule 500 mg (500 mg Oral $Given 10/5/24 3015)       Drips infusing:  No  For the majority of the shift this patient was Green.   Interventions performed were NA.    Sepsis treatment initiated: No    Cares/treatment/interventions/medications to be completed following ED care: see mar    ED Nurse Name: Millicent Hunter RN  3:20 AM     RECEIVING UNIT ED HANDOFF REVIEW    Above ED Nurse Handoff Report was reviewed: Yes  Reviewed by: Ericka Rubin RN on October 5, 2024 at 3:58 AM   I Kristina called the ED to inform them the note was read: Yes

## 2024-10-31 ENCOUNTER — HOSPITAL ENCOUNTER (INPATIENT)
Facility: CLINIC | Age: 79
LOS: 2 days | Discharge: HOME | DRG: 871 | End: 2024-11-02
Attending: EMERGENCY MEDICINE | Admitting: INTERNAL MEDICINE
Payer: COMMERCIAL

## 2024-10-31 ENCOUNTER — APPOINTMENT (OUTPATIENT)
Dept: CARDIOLOGY | Facility: CLINIC | Age: 79
DRG: 871 | End: 2024-10-31
Attending: INTERNAL MEDICINE
Payer: COMMERCIAL

## 2024-10-31 DIAGNOSIS — Z95.2 H/O MITRAL VALVE REPLACEMENT WITH MECHANICAL VALVE: Primary | ICD-10-CM

## 2024-10-31 DIAGNOSIS — R31.9 HEMATURIA, UNSPECIFIED TYPE: ICD-10-CM

## 2024-10-31 DIAGNOSIS — N30.01 ACUTE CYSTITIS WITH HEMATURIA: ICD-10-CM

## 2024-10-31 DIAGNOSIS — R33.9 URINARY RETENTION WITH INCOMPLETE BLADDER EMPTYING: ICD-10-CM

## 2024-10-31 DIAGNOSIS — R79.1 SUPRATHERAPEUTIC INR: ICD-10-CM

## 2024-10-31 LAB
ALBUMIN UR-MCNC: ABNORMAL G/DL
ANION GAP SERPL CALCULATED.3IONS-SCNC: 13 MMOL/L (ref 7–15)
APPEARANCE UR: ABNORMAL
BASOPHILS # BLD AUTO: 0 10E3/UL (ref 0–0.2)
BASOPHILS NFR BLD AUTO: 0 %
BILIRUB UR QL STRIP: ABNORMAL
BUN SERPL-MCNC: 28.9 MG/DL (ref 8–23)
CALCIUM SERPL-MCNC: 9.8 MG/DL (ref 8.8–10.4)
CHLORIDE SERPL-SCNC: 101 MMOL/L (ref 98–107)
COLOR UR AUTO: ABNORMAL
CREAT SERPL-MCNC: 1.4 MG/DL (ref 0.67–1.17)
EGFRCR SERPLBLD CKD-EPI 2021: 50 ML/MIN/1.73M2
EOSINOPHIL # BLD AUTO: 0.1 10E3/UL (ref 0–0.7)
EOSINOPHIL NFR BLD AUTO: 1 %
ERYTHROCYTE [DISTWIDTH] IN BLOOD BY AUTOMATED COUNT: 14.1 % (ref 10–15)
GLUCOSE BLDC GLUCOMTR-MCNC: 119 MG/DL (ref 70–99)
GLUCOSE BLDC GLUCOMTR-MCNC: 141 MG/DL (ref 70–99)
GLUCOSE SERPL-MCNC: 190 MG/DL (ref 70–99)
GLUCOSE UR STRIP-MCNC: ABNORMAL MG/DL
HCO3 SERPL-SCNC: 21 MMOL/L (ref 22–29)
HCT VFR BLD AUTO: 39.4 % (ref 40–53)
HGB BLD-MCNC: 12.4 G/DL (ref 13.3–17.7)
HGB UR QL STRIP: ABNORMAL
IMM GRANULOCYTES # BLD: 0.1 10E3/UL
IMM GRANULOCYTES NFR BLD: 1 %
INR PPP: 4.65 (ref 0.85–1.15)
KETONES UR STRIP-MCNC: ABNORMAL MG/DL
LEUKOCYTE ESTERASE UR QL STRIP: ABNORMAL
LVEF ECHO: NORMAL
LYMPHOCYTES # BLD AUTO: 0.8 10E3/UL (ref 0.8–5.3)
LYMPHOCYTES NFR BLD AUTO: 7 %
MCH RBC QN AUTO: 28.4 PG (ref 26.5–33)
MCHC RBC AUTO-ENTMCNC: 31.5 G/DL (ref 31.5–36.5)
MCV RBC AUTO: 90 FL (ref 78–100)
MONOCYTES # BLD AUTO: 0.9 10E3/UL (ref 0–1.3)
MONOCYTES NFR BLD AUTO: 7 %
NEUTROPHILS # BLD AUTO: 10.4 10E3/UL (ref 1.6–8.3)
NEUTROPHILS NFR BLD AUTO: 85 %
NITRATE UR QL: ABNORMAL
NRBC # BLD AUTO: 0 10E3/UL
NRBC BLD AUTO-RTO: 0 /100
PH UR STRIP: ABNORMAL [PH]
PLATELET # BLD AUTO: 221 10E3/UL (ref 150–450)
POTASSIUM SERPL-SCNC: 4.4 MMOL/L (ref 3.4–5.3)
RBC # BLD AUTO: 4.36 10E6/UL (ref 4.4–5.9)
RBC URINE: >182 /HPF
SODIUM SERPL-SCNC: 135 MMOL/L (ref 135–145)
SP GR UR STRIP: ABNORMAL
UROBILINOGEN UR STRIP-MCNC: ABNORMAL MG/DL
WBC # BLD AUTO: 12.3 10E3/UL (ref 4–11)
WBC URINE: >182 /HPF

## 2024-10-31 PROCEDURE — 99285 EMERGENCY DEPT VISIT HI MDM: CPT | Mod: 25

## 2024-10-31 PROCEDURE — 258N000003 HC RX IP 258 OP 636: Performed by: INTERNAL MEDICINE

## 2024-10-31 PROCEDURE — 250N000011 HC RX IP 250 OP 636: Performed by: INTERNAL MEDICINE

## 2024-10-31 PROCEDURE — 36415 COLL VENOUS BLD VENIPUNCTURE: CPT | Performed by: INTERNAL MEDICINE

## 2024-10-31 PROCEDURE — 82962 GLUCOSE BLOOD TEST: CPT

## 2024-10-31 PROCEDURE — 51798 US URINE CAPACITY MEASURE: CPT

## 2024-10-31 PROCEDURE — 85610 PROTHROMBIN TIME: CPT | Performed by: EMERGENCY MEDICINE

## 2024-10-31 PROCEDURE — 80048 BASIC METABOLIC PNL TOTAL CA: CPT | Performed by: EMERGENCY MEDICINE

## 2024-10-31 PROCEDURE — 87040 BLOOD CULTURE FOR BACTERIA: CPT | Performed by: INTERNAL MEDICINE

## 2024-10-31 PROCEDURE — 96374 THER/PROPH/DIAG INJ IV PUSH: CPT

## 2024-10-31 PROCEDURE — 81001 URINALYSIS AUTO W/SCOPE: CPT | Performed by: EMERGENCY MEDICINE

## 2024-10-31 PROCEDURE — 51702 INSERT TEMP BLADDER CATH: CPT

## 2024-10-31 PROCEDURE — 87186 SC STD MICRODIL/AGAR DIL: CPT | Performed by: EMERGENCY MEDICINE

## 2024-10-31 PROCEDURE — 250N000011 HC RX IP 250 OP 636: Performed by: EMERGENCY MEDICINE

## 2024-10-31 PROCEDURE — 255N000002 HC RX 255 OP 636: Performed by: INTERNAL MEDICINE

## 2024-10-31 PROCEDURE — 999N000208 ECHOCARDIOGRAM COMPLETE

## 2024-10-31 PROCEDURE — 93306 TTE W/DOPPLER COMPLETE: CPT | Mod: 26 | Performed by: INTERNAL MEDICINE

## 2024-10-31 PROCEDURE — G0378 HOSPITAL OBSERVATION PER HR: HCPCS

## 2024-10-31 PROCEDURE — 99207 PR NO BILLABLE SERVICE THIS VISIT: CPT | Performed by: INTERNAL MEDICINE

## 2024-10-31 PROCEDURE — 99222 1ST HOSP IP/OBS MODERATE 55: CPT | Mod: FS | Performed by: STUDENT IN AN ORGANIZED HEALTH CARE EDUCATION/TRAINING PROGRAM

## 2024-10-31 PROCEDURE — 85025 COMPLETE CBC W/AUTO DIFF WBC: CPT | Performed by: EMERGENCY MEDICINE

## 2024-10-31 PROCEDURE — 0T9B70Z DRAINAGE OF BLADDER WITH DRAINAGE DEVICE, VIA NATURAL OR ARTIFICIAL OPENING: ICD-10-PCS | Performed by: EMERGENCY MEDICINE

## 2024-10-31 PROCEDURE — 250N000013 HC RX MED GY IP 250 OP 250 PS 637: Performed by: INTERNAL MEDICINE

## 2024-10-31 PROCEDURE — 120N000001 HC R&B MED SURG/OB

## 2024-10-31 PROCEDURE — 99222 1ST HOSP IP/OBS MODERATE 55: CPT | Performed by: INTERNAL MEDICINE

## 2024-10-31 PROCEDURE — 36415 COLL VENOUS BLD VENIPUNCTURE: CPT | Performed by: EMERGENCY MEDICINE

## 2024-10-31 RX ORDER — CEFTRIAXONE 2 G/1
2 INJECTION, POWDER, FOR SOLUTION INTRAMUSCULAR; INTRAVENOUS EVERY 24 HOURS
Status: DISCONTINUED | OUTPATIENT
Start: 2024-10-31 | End: 2024-10-31

## 2024-10-31 RX ORDER — MULTIVITAMIN WITH IRON
1 TABLET ORAL DAILY
COMMUNITY

## 2024-10-31 RX ORDER — HYDROMORPHONE HYDROCHLORIDE 2 MG/1
2 TABLET ORAL EVERY 4 HOURS PRN
Status: DISCONTINUED | OUTPATIENT
Start: 2024-10-31 | End: 2024-11-02 | Stop reason: HOSPADM

## 2024-10-31 RX ORDER — POLYETHYLENE GLYCOL 3350 17 G/17G
17 POWDER, FOR SOLUTION ORAL 2 TIMES DAILY PRN
Status: DISCONTINUED | OUTPATIENT
Start: 2024-10-31 | End: 2024-11-02 | Stop reason: HOSPADM

## 2024-10-31 RX ORDER — SODIUM CHLORIDE, SODIUM LACTATE, POTASSIUM CHLORIDE, CALCIUM CHLORIDE 600; 310; 30; 20 MG/100ML; MG/100ML; MG/100ML; MG/100ML
INJECTION, SOLUTION INTRAVENOUS CONTINUOUS
Status: DISCONTINUED | OUTPATIENT
Start: 2024-10-31 | End: 2024-11-01

## 2024-10-31 RX ORDER — ONDANSETRON 2 MG/ML
4 INJECTION INTRAMUSCULAR; INTRAVENOUS EVERY 6 HOURS PRN
Status: DISCONTINUED | OUTPATIENT
Start: 2024-10-31 | End: 2024-11-02 | Stop reason: HOSPADM

## 2024-10-31 RX ORDER — LOSARTAN POTASSIUM 50 MG/1
50 TABLET ORAL DAILY
Status: DISCONTINUED | OUTPATIENT
Start: 2024-10-31 | End: 2024-11-02 | Stop reason: HOSPADM

## 2024-10-31 RX ORDER — NALOXONE HYDROCHLORIDE 0.4 MG/ML
0.4 INJECTION, SOLUTION INTRAMUSCULAR; INTRAVENOUS; SUBCUTANEOUS
Status: DISCONTINUED | OUTPATIENT
Start: 2024-10-31 | End: 2024-11-02 | Stop reason: HOSPADM

## 2024-10-31 RX ORDER — ONDANSETRON 4 MG/1
4 TABLET, ORALLY DISINTEGRATING ORAL EVERY 6 HOURS PRN
Status: DISCONTINUED | OUTPATIENT
Start: 2024-10-31 | End: 2024-11-02 | Stop reason: HOSPADM

## 2024-10-31 RX ORDER — ACETAMINOPHEN 650 MG/1
650 SUPPOSITORY RECTAL EVERY 4 HOURS PRN
Status: DISCONTINUED | OUTPATIENT
Start: 2024-10-31 | End: 2024-11-02 | Stop reason: HOSPADM

## 2024-10-31 RX ORDER — AMOXICILLIN 250 MG
1 CAPSULE ORAL 2 TIMES DAILY PRN
Status: DISCONTINUED | OUTPATIENT
Start: 2024-10-31 | End: 2024-11-02 | Stop reason: HOSPADM

## 2024-10-31 RX ORDER — LIDOCAINE HYDROCHLORIDE 20 MG/ML
6 JELLY TOPICAL ONCE
Status: DISCONTINUED | OUTPATIENT
Start: 2024-10-31 | End: 2024-11-02 | Stop reason: HOSPADM

## 2024-10-31 RX ORDER — MULTIVIT WITH MINERALS/LUTEIN
250 TABLET ORAL 2 TIMES DAILY
COMMUNITY

## 2024-10-31 RX ORDER — ACETAMINOPHEN 325 MG/1
650 TABLET ORAL EVERY 4 HOURS PRN
Status: DISCONTINUED | OUTPATIENT
Start: 2024-10-31 | End: 2024-11-02 | Stop reason: HOSPADM

## 2024-10-31 RX ORDER — FENTANYL CITRATE 50 UG/ML
50 INJECTION, SOLUTION INTRAMUSCULAR; INTRAVENOUS ONCE
Status: COMPLETED | OUTPATIENT
Start: 2024-10-31 | End: 2024-10-31

## 2024-10-31 RX ORDER — HYDROMORPHONE HCL IN WATER/PF 6 MG/30 ML
0.2 PATIENT CONTROLLED ANALGESIA SYRINGE INTRAVENOUS
Status: DISCONTINUED | OUTPATIENT
Start: 2024-10-31 | End: 2024-11-02 | Stop reason: HOSPADM

## 2024-10-31 RX ORDER — MULTIVITAMIN,THERAPEUTIC
1 TABLET ORAL 2 TIMES DAILY
COMMUNITY

## 2024-10-31 RX ORDER — WARFARIN SODIUM 5 MG/1
TABLET ORAL
Status: ON HOLD | COMMUNITY
End: 2024-11-02

## 2024-10-31 RX ORDER — AMOXICILLIN 250 MG
2 CAPSULE ORAL 2 TIMES DAILY PRN
Status: DISCONTINUED | OUTPATIENT
Start: 2024-10-31 | End: 2024-11-02 | Stop reason: HOSPADM

## 2024-10-31 RX ORDER — CHOLECALCIFEROL (VITAMIN D3) 50 MCG
1 TABLET ORAL DAILY
COMMUNITY

## 2024-10-31 RX ORDER — NALOXONE HYDROCHLORIDE 0.4 MG/ML
0.2 INJECTION, SOLUTION INTRAMUSCULAR; INTRAVENOUS; SUBCUTANEOUS
Status: DISCONTINUED | OUTPATIENT
Start: 2024-10-31 | End: 2024-11-02 | Stop reason: HOSPADM

## 2024-10-31 RX ORDER — HYDROMORPHONE HCL IN WATER/PF 6 MG/30 ML
0.4 PATIENT CONTROLLED ANALGESIA SYRINGE INTRAVENOUS
Status: DISCONTINUED | OUTPATIENT
Start: 2024-10-31 | End: 2024-11-02 | Stop reason: HOSPADM

## 2024-10-31 RX ORDER — PIPERACILLIN SODIUM, TAZOBACTAM SODIUM 3; .375 G/15ML; G/15ML
3.38 INJECTION, POWDER, LYOPHILIZED, FOR SOLUTION INTRAVENOUS EVERY 6 HOURS
Status: DISCONTINUED | OUTPATIENT
Start: 2024-10-31 | End: 2024-11-01

## 2024-10-31 RX ADMIN — FENTANYL CITRATE 50 MCG: 50 INJECTION, SOLUTION INTRAMUSCULAR; INTRAVENOUS at 03:27

## 2024-10-31 RX ADMIN — SODIUM CHLORIDE, POTASSIUM CHLORIDE, SODIUM LACTATE AND CALCIUM CHLORIDE: 600; 310; 30; 20 INJECTION, SOLUTION INTRAVENOUS at 18:22

## 2024-10-31 RX ADMIN — PIPERACILLIN AND TAZOBACTAM 3.38 G: 3; .375 INJECTION, POWDER, FOR SOLUTION INTRAVENOUS at 17:45

## 2024-10-31 RX ADMIN — PERFLUTREN 2 ML: 6.52 INJECTION, SUSPENSION INTRAVENOUS at 15:01

## 2024-10-31 RX ADMIN — Medication 1 MG: at 23:16

## 2024-10-31 RX ADMIN — CEFTRIAXONE 2 G: 2 INJECTION, POWDER, FOR SOLUTION INTRAMUSCULAR; INTRAVENOUS at 15:33

## 2024-10-31 RX ADMIN — PIPERACILLIN AND TAZOBACTAM 3.38 G: 3; .375 INJECTION, POWDER, FOR SOLUTION INTRAVENOUS at 23:24

## 2024-10-31 RX ADMIN — SODIUM CHLORIDE, POTASSIUM CHLORIDE, SODIUM LACTATE AND CALCIUM CHLORIDE 1000 ML: 600; 310; 30; 20 INJECTION, SOLUTION INTRAVENOUS at 12:35

## 2024-10-31 RX ADMIN — ACETAMINOPHEN 325MG 650 MG: 325 TABLET ORAL at 22:25

## 2024-10-31 RX ADMIN — ACETAMINOPHEN 325MG 650 MG: 325 TABLET ORAL at 15:36

## 2024-10-31 ASSESSMENT — ACTIVITIES OF DAILY LIVING (ADL)
ADLS_ACUITY_SCORE: 0
ADLS_ACUITY_SCORE: 10.5
ADLS_ACUITY_SCORE: 0
WEAR_GLASSES_OR_BLIND: NO
ADLS_ACUITY_SCORE: 11
ADLS_ACUITY_SCORE: 0
DOING_ERRANDS_INDEPENDENTLY_DIFFICULTY: NO
ADLS_ACUITY_SCORE: 0
ADLS_ACUITY_SCORE: 0
TOILETING_ISSUES: NO
ADLS_ACUITY_SCORE: 11.5
DIFFICULTY_COMMUNICATING: NO
ADLS_ACUITY_SCORE: 0
DRESSING/BATHING_DIFFICULTY: NO
CHANGE_IN_FUNCTIONAL_STATUS_SINCE_ONSET_OF_CURRENT_ILLNESS/INJURY: NO
FALL_HISTORY_WITHIN_LAST_SIX_MONTHS: NO
ADLS_ACUITY_SCORE: 11.5
ADLS_ACUITY_SCORE: 0
ADLS_ACUITY_SCORE: 0
ADLS_ACUITY_SCORE: 11.5
ADLS_ACUITY_SCORE: 0
HEARING_DIFFICULTY_OR_DEAF: NO
DIFFICULTY_EATING/SWALLOWING: NO
WALKING_OR_CLIMBING_STAIRS_DIFFICULTY: NO
CONCENTRATING,_REMEMBERING_OR_MAKING_DECISIONS_DIFFICULTY: NO
ADLS_ACUITY_SCORE: 0

## 2024-10-31 ASSESSMENT — COLUMBIA-SUICIDE SEVERITY RATING SCALE - C-SSRS
1. IN THE PAST MONTH, HAVE YOU WISHED YOU WERE DEAD OR WISHED YOU COULD GO TO SLEEP AND NOT WAKE UP?: NO
6. HAVE YOU EVER DONE ANYTHING, STARTED TO DO ANYTHING, OR PREPARED TO DO ANYTHING TO END YOUR LIFE?: NO
2. HAVE YOU ACTUALLY HAD ANY THOUGHTS OF KILLING YOURSELF IN THE PAST MONTH?: NO

## 2024-10-31 NOTE — PROGRESS NOTES
[facility]    Medicine Progress Note - Hospitalist Service    Date of Admission:  10/31/2024    Assessment & Plan   Celia Stallings is a 79 year old male admitted on 10/31/2024 with complaint of lower abd pain, decreased UO and recurrent hematuria.  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    PMH is notable for hx urethral stricture, s/p dilation, chronic anticoagulation with Warfarin (goal INR 2.5-3.5), a fib/flutter, mild chronic anemia, HTN. He had an episode of Endocarditis in 2021 and remote Mitral and Aortic Valves replaced in 1997 and 1999 respectively. He follows at [facility].  The patient also has a history of nephrolithiasis, BPH status post photoselective vaporization (PVP) of the prostate with meatal dilation in 2022, penile implant for ED and urethral stricture, s/p balloon dilation of proximal penile urethra. He had been intermittently self-catheterizing until     On presentation to the ED, VS: /78, HR 74, RR 22 with oxygen saturation 95% breathing room air.  Temperature 98  F.  Labs: Creatinine 1.4, BUN 29, carbon dioxide 21.  Glucose 190.  WBC 12.3, Hgb 12.4 with MCV 90 and .  INR 4.65.  Urinalysis is grossly bloody  POC ultrasound of the bladder shows a distended with debris consistent with blood obstructing bladder outlet.    Diagnoses:  Bladder outlet obstruction due to hematuria.  History of urethral stricture and chronic anticoagulation.  This is a recurrent issue for Mr. Stallings.  Supratherapeutic INR.  Patient is chronically anticoagulated for mechanical mitral and aortic valves.  VERONICA.  Baseline creat is 1.2 as of 2 weeks ago, normal before that.   Fever.  Patient has a history of endocarditis with Enterococcus faecium.  Blood culture obtained, UC pending.  Overall concerning for sepsis.  Encephalopathy likely related to infection.  Baseline to clear to me. Sons did not answer the phone.  Plan:  1.  Switch ceftriaxone out of  "for Zosyn.  2.  For now, the warfarin is held until it is no longer supra-therapeutic.  3.  Flush catheter and plan for discharge home with Mohan in place.  4.  Pt was given a single liter of LR so far. Will cont supportive IVF.   5.  Strict I and O.       Observation Goals: -diagnostic tests and consults completed and resulted, -vital signs normal or at patient baseline, -hematuria resolved, Nurse to notify provider when observation goals have been met and patient is ready for discharge.  Diet: Regular Diet Adult    DVT Prophylaxis: on warfarin, but not reversing due to risk. Dez resume when in therapeutic range.   Mohan Catheter: Not present  Lines: None     Cardiac Monitoring: None  Code Status: Full Code      Clinically Significant Risk Factors Present on Admission                # Drug Induced Coagulation Defect: home medication list includes an anticoagulant medication    # Hypertension: Noted on problem list         # Overweight: Estimated body mass index is 28.7 kg/m  as calculated from the following:    Height as of this encounter: 1.778 m (5' 10\").    Weight as of this encounter: 90.7 kg (200 lb).              Disposition Plan     Medically Ready for Discharge: Anticipated in 2-4 Days      Keyur Linton MD  Hospitalist Service  [facility]  Securely message with [employer] (more info)  Text page via Chart[employer] Paging/Directory   ______________________________________________________________________    Interval History   Chart reviewed, pt interviewed.    Appears disorganized, confused.  NAD, pleasant.     I called both sons twice.  No answer.    Physical Exam   Vital Signs: Temp: 98  F (36.7  C) Temp src: Temporal BP: 119/78 Pulse: 84   Resp: 22 SpO2: 98 %      Weight: 200 lbs 0 oz    General Appearance: Disoriented but pleasant and interactive.  No obvious facial muscular asymmetry.  Respiratory: No increased work of breathing.  Clear to auscultation throughout.  Cardiovascular: Irregularly " irregular heart rate though controlled ventricular rate.  GI: Soft, nontender, nondistended.  Skin: No obvious lesions appreciated.  There are many areas of macular hypopigmentation but no active sores.  Other: Muscle tone and bulk appears to be within normal limits.  The patient does not appear to be tracking.  Mild lower extremity edema.  Well-perfused.    Medical Decision Making       50 MINUTES SPENT BY ME on the date of service doing chart review, history, exam, documentation & further activities per the note.      Data   Results for orders placed or performed during the hospital encounter of 10/31/24 (from the past 24 hours)   POC US ABDOMEN LIMITED    Impression    Limited bedside abdominal ultrasound    Indication: Abdominal pain, urinary retention    Probe: Curvilinear probe    Windows: Longitudinal and transverse suprapubic views    Findings: Distended bladder, anechoic and hypoechoic debris present in the bladder inferiorly.    Impression: Urinary retention secondary to likely clots in the bladder.  Mohan catheter placed with confirmation of the balloon in the bladder with ultrasound.    Archived to PACS    Josse Martins MD     CBC with platelets differential    Narrative    The following orders were created for panel order CBC with platelets differential.  Procedure                               Abnormality         Status                     ---------                               -----------         ------                     CBC with platelets and d...[[id number]]  Abnormal            Final result                 Please view results for these tests on the individual orders.   Basic metabolic panel   Result Value Ref Range    Sodium 135 135 - 145 mmol/L    Potassium 4.4 3.4 - 5.3 mmol/L    Chloride 101 98 - 107 mmol/L    Carbon Dioxide (CO2) 21 (L) 22 - 29 mmol/L    Anion Gap 13 7 - 15 mmol/L    Urea Nitrogen 28.9 (H) 8.0 - 23.0 mg/dL    Creatinine 1.40 (H) 0.67 - 1.17 mg/dL    GFR Estimate 50  (L) >60 mL/min/1.73m2    Calcium 9.8 8.8 - 10.4 mg/dL    Glucose 190 (H) 70 - 99 mg/dL   INR   Result Value Ref Range    INR 4.65 (H) 0.85 - 1.15   CBC with platelets and differential   Result Value Ref Range    WBC Count 12.3 (H) 4.0 - 11.0 10e3/uL    RBC Count 4.36 (L) 4.40 - 5.90 10e6/uL    Hemoglobin 12.4 (L) 13.3 - 17.7 g/dL    Hematocrit 39.4 (L) 40.0 - 53.0 %    MCV 90 78 - 100 fL    MCH 28.4 26.5 - 33.0 pg    MCHC 31.5 31.5 - 36.5 g/dL    RDW 14.1 10.0 - 15.0 %    Platelet Count 221 150 - 450 10e3/uL    % Neutrophils 85 %    % Lymphocytes 7 %    % Monocytes 7 %    % Eosinophils 1 %    % Basophils 0 %    % Immature Granulocytes 1 %    NRBCs per 100 WBC 0 <1 /100    Absolute Neutrophils 10.4 (H) 1.6 - 8.3 10e3/uL    Absolute Lymphocytes 0.8 0.8 - 5.3 10e3/uL    Absolute Monocytes 0.9 0.0 - 1.3 10e3/uL    Absolute Eosinophils 0.1 0.0 - 0.7 10e3/uL    Absolute Basophils 0.0 0.0 - 0.2 10e3/uL    Absolute Immature Granulocytes 0.1 <=0.4 10e3/uL    Absolute NRBCs 0.0 10e3/uL   UA with Microscopic reflex to Culture    Specimen: Urine, Mohan Catheter   Result Value Ref Range    Color Urine Red (A) Colorless, Straw, Light Yellow, Yellow    Appearance Urine Cloudy (A) Clear    Glucose Urine      Bilirubin Urine      Ketones Urine      Specific Gravity Urine      Blood Urine      pH Urine      Protein Albumin Urine      Urobilinogen Urine      Nitrite Urine      Leukocyte Esterase Urine      RBC Urine >182 (H) <=2 /HPF    WBC Urine >182 (H) <=5 /HPF    Narrative    Urine Culture ordered based on laboratory criteria   Glucose by meter   Result Value Ref Range    GLUCOSE BY METER POCT 141 (H) 70 - 99 mg/dL   Echocardiogram Complete   Result Value Ref Range    LVEF  65-70%     Narrative    [id number]  [id number]  [id number]  [id number]^HERVE^NATALY^R     [facility]  Echocardiography Laboratory  [address] [place] [address]     Name: CHEODO DONNAMELVINA BARCENAS  MRN: [id number]  : 1945  Study Date:  10/31/2024 02:20 PM  Age: 79 yrs  Gender: Male  Patient Location: [facility]  Reason For Study: Aortic Valve Replacement, Mitral Valve Disorder,  Endocarditis  Ordering Physician: NATALY EDGAR  Referring Physician: Hilton Rebolledo  Performed By: Maribell Asencio RDCS     BSA: 2.1 m2  Height: 70 in  Weight: 200 lb  HR: 87  BP: 146/73 mmHg  ______________________________________________________________________________  Procedure  Complete Portable Echo Adult. Definity (NDC #19675-087) given intravenously.  ______________________________________________________________________________  Interpretation Summary     There is a mechanical mitral valve.  The visual ejection fraction is 65-70%.  There is moderate to severe concentric left ventricular hypertrophy.  There is a mechanical aortic valve.  No sign of significant prosthetic valvular dysfunction but TTE in general  cannot rule out presence of vegetations in prostthetic mitral and aortic  valves as they are in close proximity to each other and there is very  extensive acoustic interference.  ______________________________________________________________________________  Left Ventricle  The left ventricle is normal in size. There is moderate to severe concentric  left ventricular hypertrophy. The visual ejection fraction is 65-70%.     Right Ventricle  The right ventricle is normal in structure, function and size.     Atria  There is mild biatrial enlargement.     Mitral Valve  Mean gradient of 8 mmHg with HR 80-90. There is a mechanical mitral valve.  Normal prosthetic mitral valve gradients.     Tricuspid Valve  Normal tricuspid valve. Moderate (46-55mmHg) pulmonary hypertension is  present.     Aortic Valve  There is mild (1+) aortic regurgitation. The mean AoV pressure gradient is  16mmHg. There is a mechanical aortic valve.     Pulmonic Valve  The pulmonic valve is not well seen, but is grossly normal.     Vessels  Aortic root dilatation is present. Ascending aorta  dilatation is present.     Pericardium  There is no pericardial effusion.     ______________________________________________________________________________  MMode/2D Measurements & Calculations  IVSd: 1.7 cm  LVIDd: 3.9 cm  LVIDs: 2.3 cm  LVPWd: 1.4 cm  IVC diam: 2.6 cm  FS: 41.4 %  LV mass(C)d: 231.7 grams  LV mass(C)dI: 111.0 grams/m2     Ao root diam: 4.2 cm  asc Aorta Diam: 4.0 cm  Ao root diam index Ht(cm/m): 2.4  Ao root diam index BSA (cm/m2): 2.0  Asc Ao diam index BSA (cm/m2): 1.9  Asc Ao diam index Ht(cm/m): 2.3  LA Volume (BP): 118.0 ml  LA Volume Index (BP): 56.5 ml/m2  RV Base: 5.0 cm  RWT: 0.72     TAPSE: 1.2 cm     Doppler Measurements & Calculations  MV E max james: 171.2 cm/sec  MV max P.5 mmHg  MV mean P.8 mmHg  MV V2 VTI: 40.5 cm  MV P1/2t max james: 206.4 cm/sec  MV P1/2t: 86.7 msec  MVA(P1/2t): 2.5 cm2  MV dec slope: 696.9 cm/sec2  MV dec time: 0.24 sec  Ao V2 max: 273.7 cm/sec  Ao max P.0 mmHg  Ao V2 mean: 187.3 cm/sec  Ao mean P.3 mmHg  Ao V2 VTI: 44.0 cm  LV V1 max P.5 mmHg  LV V1 max: 117.3 cm/sec  LV V1 VTI: 19.9 cm  PA acc time: 0.08 sec  TR max james: 313.8 cm/sec  TR max P.6 mmHg  AV James Ratio (DI): 0.43  RV S James: 9.5 cm/sec     ______________________________________________________________________________  Report approved by: Jose Chaves 10/31/2024 03:38 PM         Glucose by meter   Result Value Ref Range    GLUCOSE BY METER POCT 119 (H) 70 - 99 mg/dL

## 2024-10-31 NOTE — PLAN OF CARE
"Goal Outcome Evaluation:    Pertinent assessments: Pt confused, disoriented to time and situation. VSS, on RA. Up Ax1 with gait belt and walker.  @ bedside. LS clear. BS active. Grullon in place. Denies pain and nausea. PIV SL. On tele, a-fib HR 91.     Major Shift Events Admitted to unit @ 1700  Treatment Plan: monitor grullon output. Si&os.   Bedside Nurse: Nishi Grace RN       Problem: Adult Inpatient Plan of Care  Goal: Plan of Care Review  Description: The Plan of Care Review/Shift note should be completed every shift.  The Outcome Evaluation is a brief statement about your assessment that the patient is improving, declining, or no change.  This information will be displayed automatically on your shift  note.  Outcome: Progressing  Flowsheets (Taken 10/31/2024 1829)  Outcome Evaluation: Grullon in place, Denies pain and nausea.  Plan of Care Reviewed With: patient  Overall Patient Progress: no change  Goal: Patient-Specific Goal (Individualized)  Description: You can add care plan individualizations to a care plan. Examples of Individualization might be:  \"Parent requests to be called daily at 9am for status\", \"I have a hard time hearing out of my right ear\", or \"Do not touch me to wake me up as it startles  me\".  Outcome: Progressing  Goal: Absence of Hospital-Acquired Illness or Injury  Outcome: Progressing  Intervention: Identify and Manage Fall Risk  Recent Flowsheet Documentation  Taken 10/31/2024 1700 by Nishi Grace RN  Safety Promotion/Fall Prevention:   activity supervised   bedside attendant   room near nurse's station   safety round/check completed  Intervention: Prevent Skin Injury  Recent Flowsheet Documentation  Taken 10/31/2024 1700 by Nishi Grace RN  Body Position: position changed independently  Intervention: Prevent and Manage VTE (Venous Thromboembolism) Risk  Recent Flowsheet Documentation  Taken 10/31/2024 1700 by Nishi Grace RN  VTE " Prevention/Management: SCDs off (sequential compression devices)  Goal: Optimal Comfort and Wellbeing  Outcome: Progressing  Goal: Readiness for Transition of Care  Outcome: Progressing  Intervention: Mutually Develop Transition Plan  Recent Flowsheet Documentation  Taken 10/31/2024 1652 by Nishi Grace RN  Equipment Currently Used at Home: none     Problem: Fall Injury Risk  Goal: Absence of Fall and Fall-Related Injury  Outcome: Progressing  Intervention: Identify and Manage Contributors  Recent Flowsheet Documentation  Taken 10/31/2024 1700 by Nishi Grace RN  Medication Review/Management: medications reviewed  Intervention: Promote Injury-Free Environment  Recent Flowsheet Documentation  Taken 10/31/2024 1700 by Nishi Grace RN  Safety Promotion/Fall Prevention:   activity supervised   bedside attendant   room near nurse's station   safety round/check completed     Problem: Urinary Retention  Goal: Effective Urinary Elimination  Outcome: Progressing         Plan of Care Reviewed With: patient    Overall Patient Progress: no changeOverall Patient Progress: no change    Outcome Evaluation: Mohan in place, Denies pain and nausea.

## 2024-10-31 NOTE — PLAN OF CARE
"[facility]    ED Boarding Nurse Handoff Addendum Report:    Date/time: 10/31/2024, 10:06 AM    Activity Level: A1    Fall Risk: Yes    Active Infusions: None    Current Meds Due: None    Current care needs: Urology will be irrigating bladder within the hour - could be as much as 1L in to clear. Output currently dark red. I irrigated with just 10ml NS, appears to be flowing, pt without pain or discomfort. Pt would not be great candidate for CBI at risk of tearing penile prosthesis walls that pt has placed.      Oxygen requirements (liters/min and/or FiO2): RA    Respiratory status: Room air    Vital signs (within last 30 minutes):    Vitals:    10/31/24 0306 10/31/24 0445 10/31/24 0742 10/31/24 0941   BP: 119/78  (!) 160/82 (!) 146/73   Pulse: 84  79 72   Resp: 22  14 18   Temp: 98  F (36.7  C)   97.5  F (36.4  C)   TempSrc: Temporal   Oral   SpO2: 95% 98% 98% 98%   Weight: 90.7 kg (200 lb)      Height: 1.778 m (5' 10\")          Focused assessment within last 30 minutes:    Aox4, denying pain. Initially incont of bowel on admission to ER-B. With grullon. Red blood drainage. - plan in current care needs.     ED Boarding Nurse name: Bernard Covarrubias RN   "

## 2024-10-31 NOTE — PHARMACY-ADMISSION MEDICATION HISTORY
Pharmacist Admission Medication History    Admission medication history is complete. The information provided in this note is only as accurate as the sources available at the time of the update.    Information Source(s): Patient and CareEverywhere/SureScripts via in-person    Pertinent Information: None    Changes made to PTA medication list:  Added: OTCs  Deleted: None  Changed: Warfarin --> updated sig to reflect days of dosing (I.e. 10mg on Sun/Wed)    Allergies reviewed with patient and updates made in EHR: yes    Medication History Completed By: Low Fink RPH 10/31/2024 8:28 AM    PTA Med List   Medication Sig Last Dose/Taking    APPLE CIDER VINEGAR PO Take 1 chew tab by mouth daily. 10/30/2024 Morning    BEE POLLEN PO Take by mouth daily. 10/30/2024 Morning    Cyanocobalamin (VITAMIN B-12 PO) Take by mouth daily. 10/30/2024 Morning    Ginkgo Biloba (GINKOBA PO) Take by mouth daily. 10/30/2024 Morning    Green Tea, Aurora sinensis, (GREEN TEA PO) Take by mouth daily. 10/30/2024 Morning    losartan (COZAAR) 50 MG tablet Take 50 mg by mouth daily. 10/30/2024 Morning    magnesium 250 MG tablet Take 1 tablet by mouth daily. 10/30/2024 Morning    multivitamin, therapeutic (THERA-VIT) TABS tablet Take 1 tablet by mouth 2 times daily. 10/30/2024 Morning    Omega-3 Fatty Acids (FISH OIL OMEGA-3 PO) Take by mouth daily. 10/30/2024 Morning    vitamin C (ASCORBIC ACID) 250 MG tablet Take 250 mg by mouth 2 times daily. 10/30/2024 Morning    vitamin D3 (CHOLECALCIFEROL) 50 mcg (2000 units) tablet Take 1 tablet by mouth daily. 10/30/2024 Morning    warfarin ANTICOAGULANT (COUMADIN) 5 MG tablet Take 10mg by mouth on Sunday and Wednesdays. Take 7.5mg by mouth on all other days of the week, or as directed by INR clinic. 10/30/2024 Morning

## 2024-10-31 NOTE — ED TRIAGE NOTES
Here for concern of blood in urine, feels like urethra is clotted up. Stated self cath at home due to history of urinary retention. Is on coumadin due to history of artificial heart valve replacement. ABCs intact.      Triage Assessment (Adult)       Row Name 10/31/24 0305          Triage Assessment    Airway WDL WDL        Respiratory WDL    Respiratory WDL WDL        Cardiac WDL    Cardiac WDL WDL

## 2024-10-31 NOTE — PROGRESS NOTES
10/31/24 1700   Skin   Skin WDL WDL;all   Skin Color pale   Skin Temperature warm   Skin Moisture dry   Skin Elasticity quick return to original state   Device Skin Interventions Taken No adjustments needed     Admission/Transfer from: ED  2 RN skin assessment completed. Yes  Significant findings include: None  LDA added (if applicable)? NO  Requested WOC Nurse Consult from MD? NO

## 2024-10-31 NOTE — PROVIDER NOTIFICATION
Came to see pt following conclusion of echo -- pt appeared in discomfort, increased labor of breathing, sob, intermittent grunting. Pt denied pain, but unable to describe what specifically felt off. Some confusion in this moment. Pt afebrile on assessments today, but found in this moment to be 103. Tele also increased into Afib rvr 120s (compared to previous afib cvr). Afib is not new. VSS otherwise. Tylenol given. No oxygen need.    2nd layer of assessment was grullon, appeared to be kinked near securement device, withholding urine. Kink released, and approx 200ml+yellow urine drained. This increased comfort for pt.     On reassessment prior to movement upstairs - afebrile - VSS, afib cvr. MD aware of this progression - and to see pt on arrival to [facility]. Pt is intermittently confused and impulsive, but not frequently - tried to get out off bed twice the entire day. Don't believe 1:1 is indicated for current behavior. Pt transferred to ms5.

## 2024-10-31 NOTE — PHARMACY-ANTICOAGULATION SERVICE
Clinical Pharmacy - Warfarin Dosing Consult     Pharmacy has been consulted to manage this patient s warfarin therapy.  Indication: Atrial Fibrillation;Mechanical Mitral Valve Replacement  Therapy Goal: INR 2.5-3.5  Warfarin Prior to Admission: Yes  Warfarin PTA Regimen: Warfarin 10mg on Sundays and Wednesdays. Warfarin 7.5mg all other days.  Recent documented change in oral intake/nutrition: No    INR   Date Value Ref Range Status   10/31/2024 4.65 (H) 0.85 - 1.15 Final   10/05/2024 4.49 (H) 0.85 - 1.15 Final       Recommend HOLD warfarin today.  Pharmacy will monitor Celia Stallings daily and order warfarin doses to achieve specified goal.      Please contact pharmacy as soon as possible if the warfarin needs to be held for a procedure or if the warfarin goals change.      Angeles Treviño, PharmD, [facility]   Emergency Medicine Pharmacist  [phone] or Kristina  October 31, 2024

## 2024-10-31 NOTE — CONSULTS
[facility] Consultation by [facility]    Celia Stallings MRN# [id number]   Age: 79 year old YOB: 1945     Date of Admission:  10/31/2024    Reason for consult: Recurrent hematuria, retention, supratherapeutic INR.  uretheral strciture, self cath at home.  similar episode 3 wks ago        Requesting PA/MD: Dr. Whitten       Level of consult: Consult, follow and place orders           Assessment and Plan:   Assessment:   Gross hematuria with urinary retention  Chronic anticoagulation in the form of warfarin for mechanical heart valve  Penile prosthesis  Incomplete bladder emptying requiring intermittent catheterization 1-2 times daily  Hypertension  Obstructive sleep apnea  Urethral stricture  Chills      Plan:   -Given patient's manipulation within the urethra as well as now having chills and possible rigors, should consider starting antibiotics and then following up with cultures.  -Continue indwelling Mohan catheter.  -Hand irrigate Mohan catheter once per shift with at least 120 mL.  -Also will need to hand irrigate Mohna catheter as needed for clots, bladder spasms, or decreased output.  -Would try to hold warfarin until it is not supratherapeutic.  -Continue to monitor hemoglobin.  -At this time, we will plan on discharging patient with Mohan catheter in place and following up with his primary urologist regarding long-term plans for burning stricture.  -Unfortunately, long-term discontinuation of anticoagulation is not an option given his mechanical heart valve.  -Will continue to follow along.    Nicole Acevedo PA-C   [facility]  [phone]               Chief Complaint:   Gross hematuria and urinary retention     History is obtained from the patient and EMR.         History of Present Illness:   This patient is a 79 year old male who presented to the ER this morning due to gross hematuria and difficulties with urinating.  Patient is able to urinate some on his own, but  does not completely empty his bladder and self caths himself typically 1-2 times per day.  He follows with [facility] urology.  He does also have a penile implant.    Patient is chronically anticoagulated secondary to mechanical heart valve.  He has been compliant with his warfarin.  He denies any fevers, chest pain, shortness of breath, lightheaded, or dizziness.    Patient was unable to have a three-way Mohan catheter placed due to urethral stricture.  He had a 16 Maltese coudé placed.  This has been hand irrigated several times.  Is currently draining clear maroon-colored urine.  He also has known history of distal bulbar urethral and proximal penile urethral stricture.  He previously underwent a greenlight PVP plus meatal dilatation in 2/20/2022 with Dr. Yanez.  IPP was placed by Dr. Sanchez in August 2022.  Patient did require meatal dilation in February of this year.  He was last seen by his primary urologist on 08/20/2024.    Patient had a similar episode with gross hematuria and retention approximately 3 weeks ago.  He was found to have supratherapeutic INR his INR is again supratherapeutic at 4.65.  Patient endorses quite a bit of chills.    Mohan catheter irrigated with approximately 750 mL of sterile saline.  Moderate amount of clot removed.  At the end is draining clear faint pink.  Irrigates easily.  Mild bladder spasms.  Patient has been afebrile without tachycardia.  Urine culture is currently in process.  Hemoglobin 12.4.          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  Family history reviewed.             Allergies:     Allergies   Allergen Reactions    Lisinopril Cough             Medications:     Current Facility-Administered Medications   Medication Dose Route Frequency Provider Last Rate Last Admin    acetaminophen (TYLENOL) tablet 650 mg  650 mg Oral Q4H PRN Rick Whitten MD        Or    acetaminophen (TYLENOL) Suppository 650 mg  650 mg Rectal Q4H PRN Rick Whitten MD        HYDROmorphone (DILAUDID) half-tab 1 mg  1 mg Oral Q4H PRN Rick Whitten MD        HYDROmorphone (DILAUDID) injection 0.2 mg  0.2 mg Intravenous Q2H PRN Rick Whitten MD        HYDROmorphone (DILAUDID) injection 0.4 mg  0.4 mg Intravenous Q2H PRN Rick Whitten MD        HYDROmorphone (DILAUDID) tablet 2 mg  2 mg Oral Q4H PRN Rick Whitten MD        lactated ringers BOLUS 1,000 mL  1,000 mL Intravenous Once Keyur Linton MD        lidocaine (XYLOCAINE) 2 % external gel 6 mL  6 mL Urethral Once Rick Whitten MD        lidocaine (XYLOCAINE) 2 % external gel 6 mL  6 mL Urethral Once Rick Whitten MD        [Held by provider] losartan (COZAAR) tablet 50 mg  50 mg Oral Daily Rick Whitten MD        melatonin tablet 1 mg  1 mg Oral At Bedtime PRN Rick Whitten MD        ondansetron (ZOFRAN ODT) ODT tab 4 mg  4 mg Oral Q6H PRN Fish  Rick Wilcox MD        Or    ondansetron (ZOFRAN) injection 4 mg  4 mg Intravenous Q6H PRN Rick Whitten MD        Patient is already receiving anticoagulation with heparin, enoxaparin (LOVENOX), warfarin (COUMADIN)  or other anticoagulant medication   Does not apply Continuous PRN Rick Whitten MD        polyethylene glycol (MIRALAX) Packet 17 g  17 g Oral BID PRN Rick Whitten MD        senna-docusate (SENOKOT-S/PERICOLACE) 8.6-50 MG per tablet 1 tablet  1 tablet Oral BID PRN Rick Whitten MD        Or    senna-docusate (SENOKOT-S/PERICOLACE) 8.6-50 MG per tablet 2 tablet  2 tablet Oral BID PRN Rick Whitten MD        Warfarin Dose Required Daily - Pharmacist Managed  1 each Oral See Admin Instructions Rick Whitten MD        warfarin-No DOSE today  1 each Does not apply no dose today (warfarin) Rick Whitten MD         Current Outpatient Medications   Medication Sig Dispense Refill    APPLE CIDER VINEGAR PO Take 1 chew tab by mouth daily.      BEE POLLEN PO Take by mouth daily.      Cyanocobalamin (VITAMIN B-12 PO) Take by mouth daily.      Ginkgo Biloba (GINKOBA PO) Take by mouth daily.      Green Tea, Aurora sinensis, (GREEN TEA PO) Take by mouth daily.      losartan (COZAAR) 50 MG tablet Take 50 mg by mouth daily.      magnesium 250 MG tablet Take 1 tablet by mouth daily.      multivitamin, therapeutic (THERA-VIT) TABS tablet Take 1 tablet by mouth 2 times daily.      Omega-3 Fatty Acids (FISH OIL OMEGA-3 PO) Take by mouth daily.      vitamin C (ASCORBIC ACID) 250 MG tablet Take 250 mg by mouth 2 times daily.      vitamin D3 (CHOLECALCIFEROL) 50 mcg (2000 units) tablet Take 1 tablet by mouth daily.      warfarin ANTICOAGULANT (COUMADIN) 5 MG tablet Take 10mg by mouth on Sunday and Wednesdays. Take 7.5mg by mouth on all other days of the week, or as directed by INR clinic.               Review of Systems:   A comprehensive 10-point review of  "systems was performed and found to be negative except as described in the HPI.     BP (!) 146/73   Pulse 72   Temp 99  F (37.2  C) (Oral)   Resp 18   Ht 1.778 m (5' 10\")   Wt 90.7 kg (200 lb)   SpO2 98%   BMI 28.70 kg/m    PSYCH: NAD, patient is having chills and what appears to be possibly the start of rigors.  He endorses not being able to get warm.  EYES: EOMI  MOUTH: MMM  NECK: Supple, no notable adenopathy  RESP: Unlabored breathing  CARDIAC: LE edema  SKIN: Warm, no rashes  ABD: soft, Nontender  NEURO: AAO x3  URO: Mohan catheter in place draining clear maroon-colored urine.  Hand irrigated with 750 mL of sterile saline.  Removed moderate amount of clots.  Irrigated easily.  Patient did have bladder spasms during this.  At the end, urine was draining well and was clear faint pink.         Data:     Lab Results   Component Value Date    WBC 12.3 (H) 10/31/2024    HGB 12.4 (L) 10/31/2024    HCT 39.4 (L) 10/31/2024    MCV 90 10/31/2024     10/31/2024     Lab Results   Component Value Date    CR 1.40 (H) 10/31/2024    CR 1.20 (H) 10/05/2024     Recent Labs   Lab 10/31/24  0510   COLOR Red*   APPEARANCE Cloudy*   RBCU >182*   WBCU >182*     INR 4.65    Urine culture: in process.    POC US ABDOMEN LIMITED    Result Date: 10/31/2024  Limited bedside abdominal ultrasound Indication: Abdominal pain, urinary retention Probe: Curvilinear probe Windows: Longitudinal and transverse suprapubic views Findings: Distended bladder, anechoic and hypoechoic debris present in the bladder inferiorly. Impression: Urinary retention secondary to likely clots in the bladder.  Mohan catheter placed with confirmation of the balloon in the bladder with ultrasound. Archived to PACS Josse Martins MD               "

## 2024-10-31 NOTE — ED NOTES
"[facility]  ED Nurse Handoff Report    ED Chief complaint: Hematuria  . ED Diagnosis:   Final diagnoses:   Hematuria, unspecified type   Urinary retention with incomplete bladder emptying   Supratherapeutic INR       Allergies:   Allergies   Allergen Reactions    Lisinopril Cough       Code Status: Full Code    Activity level - Baseline/Home:  independent.  Activity Level - Current:   independent.   Lift room needed: No.   Bariatric: No   Needed: No   Isolation: No.   Infection: Not Applicable.     Respiratory status: Room air    Vital Signs (within 30 minutes):   Vitals:    10/31/24 0306 10/31/24 0445   BP: 119/78    Pulse: 84    Resp: 22    Temp: 98  F (36.7  C)    TempSrc: Temporal    SpO2: 95% 98%   Weight: 90.7 kg (200 lb)    Height: 1.778 m (5' 10\")        Cardiac Rhythm:  ,      Pain level:    Patient confused: No.   Patient Falls Risk: nonskid shoes/slippers when out of bed.   Elimination Status: Urethral catheter (grullon) in place; refer to orders to discontinue as per protocol      Patient Report - Initial Complaint: hematuria.   Focused Assessment: Pt  is a 79 year old male chronically on warfarin secondary to mechanical heart valves here with lower abdominal pain, decreased urinary output and new hematuria with passing of clots.  Similar to earlier this month when he was hospitalized requiring bladder irrigation.      Abnormal Results:   Labs Ordered and Resulted from Time of ED Arrival to Time of ED Departure   BASIC METABOLIC PANEL - Abnormal       Result Value    Sodium 135      Potassium 4.4      Chloride 101      Carbon Dioxide (CO2) 21 (*)     Anion Gap 13      Urea Nitrogen 28.9 (*)     Creatinine 1.40 (*)     GFR Estimate 50 (*)     Calcium 9.8      Glucose 190 (*)    INR - Abnormal    INR 4.65 (*)    CBC WITH PLATELETS AND DIFFERENTIAL - Abnormal    WBC Count 12.3 (*)     RBC Count 4.36 (*)     Hemoglobin 12.4 (*)     Hematocrit 39.4 (*)     MCV 90      MCH 28.4  "     MCHC 31.5      RDW 14.1      Platelet Count 221      % Neutrophils 85      % Lymphocytes 7      % Monocytes 7      % Eosinophils 1      % Basophils 0      % Immature Granulocytes 1      NRBCs per 100 WBC 0      Absolute Neutrophils 10.4 (*)     Absolute Lymphocytes 0.8      Absolute Monocytes 0.9      Absolute Eosinophils 0.1      Absolute Basophils 0.0      Absolute Immature Granulocytes 0.1      Absolute NRBCs 0.0     ROUTINE UA WITH MICROSCOPIC REFLEX TO CULTURE        POC US ABDOMEN LIMITED   Final Result   Limited bedside abdominal ultrasound      Indication: Abdominal pain, urinary retention      Probe: Curvilinear probe      Windows: Longitudinal and transverse suprapubic views      Findings: Distended bladder, anechoic and hypoechoic debris present in the bladder inferiorly.      Impression: Urinary retention secondary to likely clots in the bladder.  Grullon catheter placed with confirmation of the balloon in the bladder with ultrasound.      Archived to PACS      Josse Martins MD             Treatments provided: grullon placement, pain medication  Family Comments: n/a  OBS brochure/video discussed/provided to patient:  Yes  ED Medications:   Medications   lidocaine (XYLOCAINE) 2 % external gel 6 mL (has no administration in time range)   lidocaine (XYLOCAINE) 2 % external gel 6 mL (has no administration in time range)   senna-docusate (SENOKOT-S/PERICOLACE) 8.6-50 MG per tablet 1 tablet (has no administration in time range)     Or   senna-docusate (SENOKOT-S/PERICOLACE) 8.6-50 MG per tablet 2 tablet (has no administration in time range)   ondansetron (ZOFRAN ODT) ODT tab 4 mg (has no administration in time range)     Or   ondansetron (ZOFRAN) injection 4 mg (has no administration in time range)   losartan (COZAAR) tablet 50 mg ( Oral Automatically Held 11/3/24 0800)   Patient is already receiving anticoagulation with heparin, enoxaparin (LOVENOX), warfarin (COUMADIN)  or other anticoagulant  medication (has no administration in time range)   acetaminophen (TYLENOL) tablet 650 mg (has no administration in time range)     Or   acetaminophen (TYLENOL) Suppository 650 mg (has no administration in time range)   HYDROmorphone (DILAUDID) half-tab 1 mg (has no administration in time range)   HYDROmorphone (DILAUDID) tablet 2 mg (has no administration in time range)   HYDROmorphone (DILAUDID) injection 0.2 mg (has no administration in time range)   HYDROmorphone (DILAUDID) injection 0.4 mg (has no administration in time range)   polyethylene glycol (MIRALAX) Packet 17 g (has no administration in time range)   melatonin tablet 1 mg (has no administration in time range)   Warfarin Dose Required Daily - Pharmacist Managed (has no administration in time range)   warfarin-No DOSE today (has no administration in time range)   fentaNYL (PF) (SUBLIMAZE) injection 50 mcg (50 mcg Intravenous $Given 10/31/24 0321)       Drips infusing:  No  For the majority of the shift this patient was Green.   Interventions performed were n/a.    Sepsis treatment initiated: No    Cares/treatment/interventions/medications to be completed following ED care: follow orders    ED Nurse Name: Meggan Deal RN  5:45 AM  RECEIVING UNIT ED HANDOFF REVIEW    Above ED Nurse Handoff Report was reviewed: Yes  Reviewed by: Nishi Grace RN on October 31, 2024 at 3:47 PM   PARISA Acevedo called the ED to inform them the note was read: Yes

## 2024-10-31 NOTE — PROGRESS NOTES
Pt had instance of confusion where he removed covers from bed and wandered into the hallway. Nursing quickly responded. Helped pt to chair. Blood sugar was 141. Pt initially disoriented to place -- stating [facility], otherwise oriented. Pt sweaty at the time. I reoriented pt. Helped pt into bed and returned to room. Afebrile. VSS x HTN 150s.     MD paged. Blood cultures added, as well as a dose of ceftrioxone. Awaiting completion of cultures before admin of this med. Started ordered LR bolus.     This was patient's first instance of confusion. Pt mentation clear for me on prior assessments. Care team previously aware of potential infection (WBCs elevated, pt endorsing chills). Confusion could be related to this.

## 2024-10-31 NOTE — H&P
[facility]  Hospitalist Admission Note  Name: Celia Stallings    MRN: [id number]  YOB: 1945    Age: 79 year old  Date of admission: 10/31/2024  Primary care provider: Hilton Rebolledo    Chief Complaint: Urinary retention    Assessment and Plan:   Hematuria with acute urinary retention  History urethral stricture and BPH: He has been doing intermittent straight cath once to twice per day per his urologist recommendations.  Previous balloon dilation for urethral stricture and TURP.  Here 3 weeks ago for hematuria and urine retention the setting of INR of 4.1 and he discharged with a Mohan catheter and placed per urology recommendations which was removed 4 days later with his primary urologist who did not recommend cystoscopy at the time.  Could not place a three-way catheter then for CBI presumably due to urethral stricture.  He returns with urinary retention at night and bladder spasms.  INR is now 4.6 and he has significant hematuria with clots.  Multiple different catheters attempted to be placed in the ER, but only a 16 [place]ovan coudé catheter could be placed.  Has been hand irrigated few times with clot removal, but still has dark red urine.  Bladder spasms improved.  He does have a leukocytosis that 12.3, but no fevers or symptoms for infection so this may just be stress demargination.  UA shows significant blood and cannot be interpreted.  -Consult urology  -Ordered bladder irrigation every shift or for bladder spasms, await urology recommendations if that should be more frequent  -Holding warfarin dose today  -CBC tomorrow to follow-up on leukocytosis.  Low threshold to start antibiotics if any fevers.    Anemia: Hemoglobin is 12.4 which is up from the 10 range earlier this month when he had active bleeding.  -CBC tomorrow morning    Mechanical aortic valve replacement  Mechanical mitral valve replacement  A-fib, a flutter  Supratherapeutic INR  HTN  History endocarditis: History  mechanical aortic valve replacement in 1997 and mechanical mitral valve replacement in 1999.  History of endocarditis in 2021.  He has A-fib, a flutter and is chronically on warfarin with goal INR of 2.5-3.5 per patient.  Supratherapeutic INR at 4.6 on admission.  He normally takes warfarin in the morning with last dose 10/30 which was 10 mg.   He takes 7.5 mg 5 days/week and 10 mg 2 days/week.  His INR was at goal at 2.6 on 10/23.  Denies any recent change in his diet.  He is also on losartan 50 mg daily.  -Consult Pharm.D. for warfarin dosing, but will hold his warfarin dose today.  Anticipate he needs a reduced dose on discharge which he already did 3 weeks ago.  Perhaps just taking 7.5 mg daily instead of taking 10 mg on 2 of the 7 days of the week with close follow-up of INR.  -Daily INR  -Do not recommend reversing INR secondary to significantly stroke risk in the setting of a mechanical mitral valve replacement  -Holding PTA losartan due to VERONICA     HLD: Says he no longer takes atorvastatin.     VERONICA on CKD stage II: Creatinine baseline of 1.1-1.2.  Creatinine here is 1.4 which I suspect is secondary to obstruction.  -Hold PTA losartan  -Check BMP tomorrow     Lower extremity edema: He has persistent 1-2+ bilateral lower extremity pitting edema on exam that he had last admission and said was new at the time.  I had recommended he get a TTE done as an outpatient which he has scheduled for January 2025.  Denies any exertional dyspnea, chest pain, orthopnea.  I do not hear any loud murmur on exam.    DVT Prophylaxis: Warfarin  Code Status: Full Code  FEN: Regular diet  Discharge Dispo: Home  Estimated Disch Date / # of Days until Disch: Admit observation for hematuria.  Anticipate discharge once hematuria/clots resolve within the next 24-48 hours.      History of Present Illness:  Celia Stallings is a 79 year old male with PMH including mechanical aortic and mitral valve replacements on warfarin, A-fib/flutter,  "HTN, HLD, endocarditis, anemia, CKD stage II, nephrolithiasis, BPH, and urethral stricture requiring balloon dilation earlier this year who presents with urinary retention.  He was hospitalized here 3 weeks ago for hematuria and urinary retention with clots in the setting of a supratherapeutic INR at 4.1.  He discharged with a Mohan catheter in place and followed up with his primary urologist 4 days later who remove the catheter.  No cystoscopy was recommended at that time.  He is continued with intermittent straight cath at home.  He believes his warfarin dose was decreased some and his INR was 2.6 one week ago.  Earlier tonight he tried to do a straight cath, but no urine came out.  He then was having bladder spasms and came in for evaluation due to retention.  Denies any fevers, chills, nausea, vomiting, diarrhea.  Has not changed his diet in any significant way.    History obtained from patient, medical record, and from Dr. Martins in the emergency department.  Blood pressure 119/78, heart rate 84, temperature 98  F, oxygen 95% on room air.  He has a leukocytosis of 12.3, hemoglobin 12.4, platelet count 221.  Creatinine elevated 1.4 otherwise BMP unremarkable.  Glucose is 190.  INR is 4.65.  He received 50 mcg of IV fentanyl.  Multiple different catheters were attempted, but only a 16 Togolese coudé catheter can be placed.  Has had hand bladder irrigation done a few times in the ER with clot removal.  Still has dark red urine so he is admitted to observation.       Clinically Significant Risk Factors Present on Admission                # Drug Induced Coagulation Defect: home medication list includes an anticoagulant medication    # Hypertension: Noted on problem list         # Overweight: Estimated body mass index is 28.7 kg/m  as calculated from the following:    Height as of this encounter: 1.778 m (5' 10\").    Weight as of this encounter: 90.7 kg (200 lb).                        Past Medical History " reviewed:  Past Medical History:   Diagnosis Date    Atrial fibrillation (H)     Atrial flutter (H)     BPH (benign prostatic hyperplasia)     Discitis of lumbar region 11/06/2021    At time enterococcal sepsis    Endocarditis     2021    Enterococcal sepsis (H) 11/06/2021    E faecalis, presumed PVE    H/O mechanical aortic valve replacement 1997    H/O mitral valve replacement with mechanical valve 1999    HLD (hyperlipidemia)     HTN (hypertension)     Infective endocarditis of [facility] mitral valve 11/05/2021    Kidney stones     Urethral stricture      Past Surgical History reviewed:  Past Surgical History:   Procedure Laterality Date    CYSTOSCOPY N/A 2/11/2022    Procedure: Cystoscopy, evacuation of bladder hematoma, fulguration of the prostate;  Surgeon: Tonny Lacy MD;  Location:  OR     Social History reviewed:  Social History     Tobacco Use    Smoking status: Not on file    Smokeless tobacco: Not on file   Substance Use Topics    Alcohol use: Not on file     Social History     Social History Narrative    Not on file     Family History reviewed:  Reviewed and noncontributory to this admission  Allergies:  Allergies   Allergen Reactions    Lisinopril Cough     Medications:  Prior to Admission medications    Medication Sig Last Dose Taking? Auth Provider Long Term End Date   BEE POLLEN PO Take by mouth daily.   Unknown, Entered By History     Cyanocobalamin (VITAMIN B-12 PO) Take by mouth daily.   Unknown, Entered By History     Ginkgo Biloba (GINKOBA PO) Take by mouth daily.   Unknown, Entered By History     Green Tea, Aurora sinensis, (GREEN TEA PO) Take by mouth daily.   Unknown, Entered By History     losartan (COZAAR) 50 MG tablet Take 50 mg by mouth daily.   Unknown, Entered By History Yes    Omega-3 Fatty Acids (FISH OIL OMEGA-3 PO) Take by mouth daily.   Unknown, Entered By History     warfarin ANTICOAGULANT (COUMADIN) 5 MG tablet Take 1.5 tablets (7.5 mg) by mouth daily. Take 7.5 mg -  "five times weekly  10 mg  twice  a week   Jose Husain MD       Review of Systems:  A Comprehensive greater than 10 system review of systems was carried out.  Pertinent positives and negatives are noted above.  Otherwise negative.     Physical Exam:  Blood pressure 119/78, pulse 84, temperature 98  F (36.7  C), temperature source Temporal, resp. rate 22, height 1.778 m (5' 10\"), weight 90.7 kg (200 lb), SpO2 98%.  Wt Readings from Last 1 Encounters:   10/31/24 90.7 kg (200 lb)     Exam:  Constitutional: Awake, NAD   Eyes: sclera white   HEENT:  MMM  Respiratory: no respiratory distress, anterior lungs cta bilaterally, no crackles or wheeze  Cardiovascular: Irregularly, regular rhythm, loud S1 and S2, no murmur  GI: Nontender to palpation, soft, not distended, bowel sounds present  Genitourinary: Mohan catheter present with dark red urine in the bag  Skin: no rash  Musculoskeletal/extremities: 1-2+ bilateral lower extremity pitting edema  Neurologic: A&O, speech clear  Psychiatric: calm, cooperative, normal affect    Lab and imaging data personally reviewed:  Labs:  Recent Labs   Lab 10/31/24  0325   WBC 12.3*   HGB 12.4*   HCT 39.4*   MCV 90        Recent Labs   Lab 10/31/24  0325      POTASSIUM 4.4   CHLORIDE 101   CO2 21*   ANIONGAP 13   *   BUN 28.9*   CR 1.40*   GFRESTIMATED 50*   RICKI 9.8     Recent Labs   Lab 10/31/24  0325   INR 4.65*     Recent Labs   Lab 10/31/24  0510   COLOR Red*   APPEARANCE Cloudy*   RBCU >182*   WBCU >182*       Imaging:  Recent Results (from the past 24 hours)   POC US ABDOMEN LIMITED    Impression    Limited bedside abdominal ultrasound    Indication: Abdominal pain, urinary retention    Probe: Curvilinear probe    Windows: Longitudinal and transverse suprapubic views    Findings: Distended bladder, anechoic and hypoechoic debris present in the bladder inferiorly.    Impression: Urinary retention secondary to likely clots in the bladder.  Mohan catheter placed " with confirmation of the balloon in the bladder with ultrasound.    Archived to PACS    MD Rick Chaudhry MD  Hospitalist  [facility]     Vermilion Border Text: The closure involved the vermilion border.

## 2024-10-31 NOTE — ED PROVIDER NOTES
Emergency Department Note      History of Present Illness     Chief Complaint   Hematuria      HPI   Celia Stallings is a 79 year old male chronically on warfarin secondary to mechanical heart valves here with lower abdominal pain, decreased urinary output and new hematuria with passing of clots.  Similar to earlier this month when he was hospitalized requiring bladder irrigation.      Self caths at home.  History of penile implant.    States he has been compliant with his warfarin.  Last dose was the morning of 1030.    No fever, chest pain, shortness of breath, lightheaded or dizziness.    Independent Historian       Review of External Notes   I reviewed previous hospitalization admission note and consult note including out of urology on October 5, 2024.  They noted on his last cystoscopy in August due to urethral stricture.    Past Medical History     Medical History and Problem List   Past Medical History:   Diagnosis Date    Atrial fibrillation (H)     Atrial flutter (H)     BPH (benign prostatic hyperplasia)     Discitis of lumbar region 11/06/2021    Endocarditis     Enterococcal sepsis (H) 11/06/2021    H/O mechanical aortic valve replacement 1997    H/O mitral valve replacement with mechanical valve 1999    HLD (hyperlipidemia)     HTN (hypertension)     Infective endocarditis of [facility] mitral valve 11/05/2021    Kidney stones     Urethral stricture        Medications   BEE POLLEN PO  Cyanocobalamin (VITAMIN B-12 PO)  Ginkgo Biloba (GINKOBA PO)  Green Tea, Aurora sinensis, (GREEN TEA PO)  losartan (COZAAR) 50 MG tablet  Omega-3 Fatty Acids (FISH OIL OMEGA-3 PO)  warfarin ANTICOAGULANT (COUMADIN) 5 MG tablet        Surgical History   Past Surgical History:   Procedure Laterality Date    CYSTOSCOPY N/A 2/11/2022    Procedure: Cystoscopy, evacuation of bladder hematoma, fulguration of the prostate;  Surgeon: Tonny Lacy MD;  Location:  OR       Physical Exam     Patient Vitals for the past 24  "hrs:   BP Temp Temp src Pulse Resp SpO2 Height Weight   10/31/24 0445 -- -- -- -- -- 98 % -- --   10/31/24 0306 119/78 98  F (36.7  C) Temporal 84 22 95 % 1.778 m (5' 10\") 90.7 kg (200 lb)         CV: ppi, regular   Resp: speaking in full sentences without any resp distress  Skin: warm dry well perfused  Neuro: Alert, no gross motor or sensory deficits,  gait stable      Uncomfortable secondary to pain    Abdomen: Lower abdominal tenderness and palpable bladder dome just inferior to the umbilicus.      Diagnostics     Lab Results   Labs Ordered and Resulted from Time of ED Arrival to Time of ED Departure   BASIC METABOLIC PANEL - Abnormal       Result Value    Sodium 135      Potassium 4.4      Chloride 101      Carbon Dioxide (CO2) 21 (*)     Anion Gap 13      Urea Nitrogen 28.9 (*)     Creatinine 1.40 (*)     GFR Estimate 50 (*)     Calcium 9.8      Glucose 190 (*)    INR - Abnormal    INR 4.65 (*)    CBC WITH PLATELETS AND DIFFERENTIAL - Abnormal    WBC Count 12.3 (*)     RBC Count 4.36 (*)     Hemoglobin 12.4 (*)     Hematocrit 39.4 (*)     MCV 90      MCH 28.4      MCHC 31.5      RDW 14.1      Platelet Count 221      % Neutrophils 85      % Lymphocytes 7      % Monocytes 7      % Eosinophils 1      % Basophils 0      % Immature Granulocytes 1      NRBCs per 100 WBC 0      Absolute Neutrophils 10.4 (*)     Absolute Lymphocytes 0.8      Absolute Monocytes 0.9      Absolute Eosinophils 0.1      Absolute Basophils 0.0      Absolute Immature Granulocytes 0.1      Absolute NRBCs 0.0     ROUTINE UA WITH MICROSCOPIC REFLEX TO CULTURE       Imaging   POC US ABDOMEN LIMITED   Final Result   Limited bedside abdominal ultrasound      Indication: Abdominal pain, urinary retention      Probe: Curvilinear probe      Windows: Longitudinal and transverse suprapubic views      Findings: Distended bladder, anechoic and hypoechoic debris present in the bladder inferiorly.      Impression: Urinary retention secondary to likely " clots in the bladder.  Mohan catheter placed with confirmation of the balloon in the bladder with ultrasound.      Archived to PACS      Josse Martins MD             EKG       Independent Interpretation       ED Course      Medications Administered   Medications   lidocaine (XYLOCAINE) 2 % external gel 6 mL (has no administration in time range)   lidocaine (XYLOCAINE) 2 % external gel 6 mL (has no administration in time range)   fentaNYL (PF) (SUBLIMAZE) injection 50 mcg (50 mcg Intravenous $Given 10/31/24 3727)       Procedures   Procedures     Discussion of Management   Discussed with admitting hospitalist.    ED Course        Additional Documentation      Medical Decision Making / Diagnosis     CMS Diagnoses:     [facility]   Celia Stallings is a 79 year old male chronically on warfarin for mechanical heart valve presenting with hematuria and urinary retention due to presumed to be blood clot obstructing outflow.  Similar admission earlier this month.  INR is supratherapeutic at 4.65 tonight.  No significant new acute renal failure or concerning blood loss.  Difficult Mohan insertion related to history of urethral stricture.  Unable to pass any type of three-way catheter that we had ultimately had to pass a 16 [place] coudé  catheter.  Will need hand irrigation.  Recommending observation admission for continued management.    Disposition   The patient was admitted to the hospital.     Diagnosis     ICD-10-CM    1. Hematuria, unspecified type  R31.9       2. Urinary retention with incomplete bladder emptying  R33.9       3. Supratherapeutic INR  R79.1            Discharge Medications   New Prescriptions    No medications on file         MD Eliezer Chaudhry, Josse Kaur MD  10/31/24 5347

## 2024-11-01 PROBLEM — G93.41 METABOLIC ENCEPHALOPATHY: Status: ACTIVE | Noted: 2024-11-01

## 2024-11-01 PROBLEM — A41.9 SEPSIS WITHOUT ACUTE ORGAN DYSFUNCTION (H): Status: ACTIVE | Noted: 2024-11-01

## 2024-11-01 LAB
ALBUMIN SERPL BCG-MCNC: 3.2 G/DL (ref 3.5–5.2)
ALP SERPL-CCNC: 49 U/L (ref 40–150)
ALT SERPL W P-5'-P-CCNC: 13 U/L (ref 0–70)
ANION GAP SERPL CALCULATED.3IONS-SCNC: 10 MMOL/L (ref 7–15)
AST SERPL W P-5'-P-CCNC: 25 U/L (ref 0–45)
BACTERIA UR CULT: ABNORMAL
BILIRUB SERPL-MCNC: 0.9 MG/DL
BUN SERPL-MCNC: 20.6 MG/DL (ref 8–23)
CALCIUM SERPL-MCNC: 8.7 MG/DL (ref 8.8–10.4)
CHLORIDE SERPL-SCNC: 104 MMOL/L (ref 98–107)
CREAT SERPL-MCNC: 1.37 MG/DL (ref 0.67–1.17)
EGFRCR SERPLBLD CKD-EPI 2021: 52 ML/MIN/1.73M2
ERYTHROCYTE [DISTWIDTH] IN BLOOD BY AUTOMATED COUNT: 14.3 % (ref 10–15)
GLUCOSE SERPL-MCNC: 114 MG/DL (ref 70–99)
HCO3 SERPL-SCNC: 22 MMOL/L (ref 22–29)
HCT VFR BLD AUTO: 33.1 % (ref 40–53)
HGB BLD-MCNC: 10.4 G/DL (ref 13.3–17.7)
INR PPP: 2.47 (ref 0.85–1.15)
MCH RBC QN AUTO: 28.2 PG (ref 26.5–33)
MCHC RBC AUTO-ENTMCNC: 31.4 G/DL (ref 31.5–36.5)
MCV RBC AUTO: 90 FL (ref 78–100)
PLATELET # BLD AUTO: 141 10E3/UL (ref 150–450)
POTASSIUM SERPL-SCNC: 4.1 MMOL/L (ref 3.4–5.3)
PROT SERPL-MCNC: 5.5 G/DL (ref 6.4–8.3)
RBC # BLD AUTO: 3.69 10E6/UL (ref 4.4–5.9)
SODIUM SERPL-SCNC: 136 MMOL/L (ref 135–145)
WBC # BLD AUTO: 13.8 10E3/UL (ref 4–11)

## 2024-11-01 PROCEDURE — 80053 COMPREHEN METABOLIC PANEL: CPT | Performed by: INTERNAL MEDICINE

## 2024-11-01 PROCEDURE — 36415 COLL VENOUS BLD VENIPUNCTURE: CPT | Performed by: INTERNAL MEDICINE

## 2024-11-01 PROCEDURE — 85014 HEMATOCRIT: CPT | Performed by: INTERNAL MEDICINE

## 2024-11-01 PROCEDURE — 258N000003 HC RX IP 258 OP 636: Performed by: INTERNAL MEDICINE

## 2024-11-01 PROCEDURE — 250N000011 HC RX IP 250 OP 636: Performed by: INTERNAL MEDICINE

## 2024-11-01 PROCEDURE — 99232 SBSQ HOSP IP/OBS MODERATE 35: CPT | Performed by: INTERNAL MEDICINE

## 2024-11-01 PROCEDURE — 99231 SBSQ HOSP IP/OBS SF/LOW 25: CPT | Performed by: PHYSICIAN ASSISTANT

## 2024-11-01 PROCEDURE — 120N000001 HC R&B MED SURG/OB

## 2024-11-01 PROCEDURE — 250N000013 HC RX MED GY IP 250 OP 250 PS 637: Performed by: INTERNAL MEDICINE

## 2024-11-01 PROCEDURE — 85610 PROTHROMBIN TIME: CPT | Performed by: INTERNAL MEDICINE

## 2024-11-01 RX ORDER — CEFTRIAXONE 1 G/1
1 INJECTION, POWDER, FOR SOLUTION INTRAMUSCULAR; INTRAVENOUS EVERY 24 HOURS
Status: DISCONTINUED | OUTPATIENT
Start: 2024-11-01 | End: 2024-11-02 | Stop reason: HOSPADM

## 2024-11-01 RX ADMIN — PIPERACILLIN AND TAZOBACTAM 3.38 G: 3; .375 INJECTION, POWDER, FOR SOLUTION INTRAVENOUS at 05:19

## 2024-11-01 RX ADMIN — PIPERACILLIN AND TAZOBACTAM 3.38 G: 3; .375 INJECTION, POWDER, FOR SOLUTION INTRAVENOUS at 17:44

## 2024-11-01 RX ADMIN — ACETAMINOPHEN 325MG 650 MG: 325 TABLET ORAL at 23:47

## 2024-11-01 RX ADMIN — Medication 1 MG: at 21:50

## 2024-11-01 RX ADMIN — PIPERACILLIN AND TAZOBACTAM 3.38 G: 3; .375 INJECTION, POWDER, FOR SOLUTION INTRAVENOUS at 10:45

## 2024-11-01 RX ADMIN — WARFARIN SODIUM 7.5 MG: 5 TABLET ORAL at 17:44

## 2024-11-01 RX ADMIN — SODIUM CHLORIDE, POTASSIUM CHLORIDE, SODIUM LACTATE AND CALCIUM CHLORIDE: 600; 310; 30; 20 INJECTION, SOLUTION INTRAVENOUS at 05:59

## 2024-11-01 RX ADMIN — CEFTRIAXONE 1 G: 1 INJECTION, POWDER, FOR SOLUTION INTRAMUSCULAR; INTRAVENOUS at 21:47

## 2024-11-01 ASSESSMENT — ACTIVITIES OF DAILY LIVING (ADL)
ADLS_ACUITY_SCORE: 0
DEPENDENT_IADLS:: INDEPENDENT
ADLS_ACUITY_SCORE: 0

## 2024-11-01 NOTE — CONSULTS
"CLINICAL NUTRITION SERVICES  -  ASSESSMENT NOTE      Recommendations Ordered by Registered Dietitian (RD):   Continue diet order per MD    Monitor PO intake acutely. So far, intakes 100% and good appetite    Please continue to chart intakes and appetite.      MALNUTRITION:  % Weight Loss:  None noted  % Intake:  No decreased intake noted  Subcutaneous Fat Loss:  Will complete at f/up  Muscle Loss:  Will complete at f/up  Fluid Retention:  None noted    Malnutrition Diagnosis: Patient does not meet two of the above criteria necessary for diagnosing malnutrition - NFPE pending. Monitoring PO intakes acutely.           REASON FOR ASSESSMENT  Celia Stallings is a 79 year old male seen by Registered Dietitian for Admission Nutrition Risk Screen for positive MST of 2 for unsure weight loss    PMH: endocarditis, LOUISA, urinary retention    Per EMR, pt presented to ED on 10/31/24 with hematuria and urinary retention due to presumed to be blood clot obstructing outflow. Similar admission earlier this month.     Urology was consulted. Pt will continue w/ antibiotics. Per urology note, pt likely to discharge tomorrow.      NUTRITION HISTORY    - Information obtained from chart review.  - Diet at home: Per H&P, pt has not changed his diet in any significant way and he did not note any dietary irregularities.   - Usual intakes: No changes noted.   - Barriers to PO intakes: No barriers noted.   - Allergies: NKFA    Strict I/Os have been ordered. No documented concern regarding nutritional status.      CURRENT NUTRITION ORDERS  Diet Order:     Regular    Current Intake/Tolerance:  100% intakes and good appetite per flowsheet documentation.  Per [facility], ordering well portioned meals TID.                Obtained from Chart/Interdisciplinary Team:  No current documentation of significant edema, ascites, pressure injuries, or nonhealing wounds.    ANTHROPOMETRICS  Height: 5' 10\"  Weight: 200 lbs 0 oz  Body mass index is 28.7 " kg/m .  Weight Status:  Overweight BMI 25-29.9  Weight History: Weight has been trending down, but no significant decreases.  Wt Readings from Last 10 Encounters:   10/31/24 90.7 kg (200 lb)   10/05/24 92.9 kg (204 lb 14.4 oz)   02/11/22 90.7 kg (200 lb)   11/12/21 96.5 kg (212 lb 12.8 oz)   11/04/21 93 kg (205 lb)   Care Everywhere:   06/14/24 : 93 kg (205 lb)   01/26/24 : 96.6 kg (213 lb)     LABS  Labs reviewed  Noted: Na and K WNL. High creat(1.37), low GFR(52), high BG(114)    MEDICATIONS  Medications reviewed  Noted: warfarin (chronic)      ASSESSED NUTRITION NEEDS PER APPROVED PRACTICE GUIDELINES:    Dosing Weight 90.7 kg (Actual BW)  Estimated Energy Needs: 15-20 Kcal/Kg  Justification: maintenance  Estimated Protein Needs: 1-1.2 g pro/Kg  Justification: maintenance and preservation of lean body mass w/ advancing age  Estimated Fluid Needs: 25-30 ml/kg or per provider pending fluid status      NUTRITION DIAGNOSIS:  Predicted inadequate energy-protein intake related to potential for PO intake to decrease pending LOS and appetite.       NUTRITION INTERVENTIONS  Recommendations / Nutrition Prescription  See Above      Implementation  Nutrition education: Per Provider order if indicated       Nutrition Goals  >75% PO nutritionally adequate meals/supplements TID (on average)        MONITORING AND EVALUATION:  Progress towards goals will be monitored and evaluated per protocol and Practice Guidelines          Sanjuana Tobin RD, LD  Clinical Dietitian  3rd Floor/ICU: [phone]  All Other Floors: [phone]  Weekends/Holiday: [phone]  Office: [phone]

## 2024-11-01 NOTE — PLAN OF CARE
"Assumed care [id number]. A&Ox2, disoriented to time and situation. Ax1 with a gait belt and walker when OOB. On RA. On a regular diet. PIV in R arm infusing  mL/hr. C/O some discomfort in L leg and given PRN Tylenol. Indwelling grullon catheter in place draining clear yellow urine adequately. Sitter at the bedside. Plan of care ongoing.     Problem: Adult Inpatient Plan of Care  Goal: Plan of Care Review  Description: The Plan of Care Review/Shift note should be completed every shift.  The Outcome Evaluation is a brief statement about your assessment that the patient is improving, declining, or no change.  This information will be displayed automatically on your shift  note.  Outcome: Progressing  Flowsheets (Taken 10/31/2024 2146)  Outcome Evaluation: Grullon in place, on tele, sitter at bedside  Plan of Care Reviewed With: patient  Overall Patient Progress: no change  Goal: Patient-Specific Goal (Individualized)  Description: You can add care plan individualizations to a care plan. Examples of Individualization might be:  \"Parent requests to be called daily at 9am for status\", \"I have a hard time hearing out of my right ear\", or \"Do not touch me to wake me up as it startles  me\".  Outcome: Progressing  Goal: Absence of Hospital-Acquired Illness or Injury  Outcome: Progressing  Intervention: Identify and Manage Fall Risk  Recent Flowsheet Documentation  Taken 10/31/2024 2125 by Pinky Stanford, RN  Safety Promotion/Fall Prevention:   activity supervised   bedside attendant   lighting adjusted   nonskid shoes/slippers when out of bed   room door open   room near nurse's station   safety round/check completed  Intervention: Prevent Skin Injury  Recent Flowsheet Documentation  Taken 10/31/2024 2125 by Pinky Stanford, RN  Body Position: position changed independently  Intervention: Prevent and Manage VTE (Venous Thromboembolism) Risk  Recent Flowsheet Documentation  Taken 10/31/2024 2125 by Pinky Stanford, " RN  VTE Prevention/Management: (Warfarin/Ambulatory) SCDs off (sequential compression devices)  Intervention: Prevent Infection  Recent Flowsheet Documentation  Taken 10/31/2024 2125 by Pinky Stanford RN  Infection Prevention:   equipment surfaces disinfected   hand hygiene promoted   personal protective equipment utilized   rest/sleep promoted   single patient room provided  Goal: Optimal Comfort and Wellbeing  Outcome: Progressing  Intervention: Monitor Pain and Promote Comfort  Recent Flowsheet Documentation  Taken 10/31/2024 2125 by Pinky Stanford RN  Pain Management Interventions:   medication offered but refused   rest  Goal: Readiness for Transition of Care  Outcome: Progressing     Problem: Fall Injury Risk  Goal: Absence of Fall and Fall-Related Injury  Outcome: Progressing  Intervention: Identify and Manage Contributors  Recent Flowsheet Documentation  Taken 10/31/2024 2125 by Pinky Stanford RN  Medication Review/Management: medications reviewed  Intervention: Promote Injury-Free Environment  Recent Flowsheet Documentation  Taken 10/31/2024 2125 by Pinky Stanford RN  Safety Promotion/Fall Prevention:   activity supervised   bedside attendant   lighting adjusted   nonskid shoes/slippers when out of bed   room door open   room near nurse's station   safety round/check completed     Problem: Urinary Retention  Goal: Effective Urinary Elimination  Outcome: Progressing     Problem: Comorbidity Management  Goal: Blood Pressure in Desired Range  Outcome: Progressing  Intervention: Maintain Blood Pressure Management  Recent Flowsheet Documentation  Taken 10/31/2024 2125 by Pinky Stanford RN  Medication Review/Management: medications reviewed   Goal Outcome Evaluation:      Plan of Care Reviewed With: patient    Overall Patient Progress: no changeOverall Patient Progress: no change    Outcome Evaluation: Mohan in place, on tele, sitter at bedside

## 2024-11-01 NOTE — CONSULTS
Care Management Initial Consult    General Information  Assessment completed with: Patient,    Type of CM/SW Visit: Initial Assessment    Primary Care Provider verified and updated as needed: Yes   Readmission within the last 30 days: no previous admission in last 30 days      Reason for Consult: other (see comments) (SDOH)  Advance Care Planning:          Communication Assessment  Patient's communication style: spoken language (English or Bilingual)    Hearing Difficulty or Deaf: no   Wear Glasses or Blind: no    Cognitive  Cognitive/Neuro/Behavioral: WDL  Level of Consciousness: alert  Arousal Level: opens eyes spontaneously  Orientation: oriented x 4  Mood/Behavior: cooperative, calm  Best Language: 0 - No aphasia  Speech: logical, spontaneous, clear    Living Environment:   People in home: child(jose a), adult     Current living Arrangements: house      Able to return to prior arrangements: yes     Family/Social Support:  Care provided by: self  Provides care for: no one  Marital Status: Single  Support system: Children          Description of Support System: Supportive, Involved    Support Assessment: Adequate family and caregiver support, Adequate social supports    Current Resources:   Patient receiving home care services: No     Community Resources: None  Equipment currently used at home: none  Supplies currently used at home: Other (self-cath supplies)    Employment/Financial:  Employment Status: retired        Financial Concerns: none      Does the patient's insurance plan have a 3 day qualifying hospital stay waiver?  No    Lifestyle & Psychosocial Needs:  Social Drivers of Health     Food Insecurity: Low Risk  (10/31/2024)    Food Insecurity     Within the past 12 months, did you worry that your food would run out before you got money to buy more?: No     Within the past 12 months, did the food you bought just not last and you didn t have money to get more?: No   Depression: Not at risk (6/14/2024)     Received from [employer]    PHQ-2     PHQ-2 Score: 0   Housing Stability: High Risk (10/31/2024)    Housing Stability     Do you have housing? : No     Are you worried about losing your housing?: No   Tobacco Use: Low Risk  (6/14/2024)    Received from [employer]    Patient History     Smoking Tobacco Use: Never     Smokeless Tobacco Use: Never     Passive Exposure: Never   Financial Resource Strain: Low Risk  (10/31/2024)    Financial Resource Strain     Within the past 12 months, have you or your family members you live with been unable to get utilities (heat, electricity) when it was really needed?: No   Alcohol Use: Not on file   Transportation Needs: Low Risk  (10/31/2024)    Transportation Needs     Within the past 12 months, has lack of transportation kept you from medical appointments, getting your medicines, non-medical meetings or appointments, work, or from getting things that you need?: No   Physical Activity: Not on file   Interpersonal Safety: Low Risk  (10/31/2024)    Interpersonal Safety     Do you feel physically and emotionally safe where you currently live?: Yes     Within the past 12 months, have you been hit, slapped, kicked or otherwise physically hurt by someone?: No     Within the past 12 months, have you been humiliated or emotionally abused in other ways by your partner or ex-partner?: No   Stress: Not on file   Social Connections: Not on file   Health Literacy: Not on file       Functional Status:  Prior to admission patient needed assistance:   Dependent ADLs:: Independent  Dependent IADLs:: Independent     Values/Beliefs:  Spiritual, Cultural Beliefs, Restoration Practices, Values that affect care: no             Discussed  Partnership in Safe Discharge Planning  document with patient/family: No    Additional Information:  Care management consult for SDOH needs. It was indicated that patient has housing instability. Patient admitted for hematuria, urinary retention.     Met with  patient at bedside. He lives in his own home in [place]. His son resides with him. He denies any housing instability or other needs. Independent at baseline.     No further care management intervention anticipated at this time.  Please re-consult if further needs arise.  Care management signing off.       Kae Bro RN  Care Coordinator  [facility]

## 2024-11-01 NOTE — PLAN OF CARE
"Temp: 98.3  F (36.8  C) Temp src: Oral BP: 128/50 Pulse: 72   Resp: 17 SpO2: 96 % O2 Device: None (Room air)       A/O4. Forgetful. Up indp. Mohan in place. Mohan wipes done, clear yellow urine with few clots. Tele Afib CVR. Multiple 2 sec pauses at beginning of shift. MD aware. IVF discontinued. On IV ABX. Warfarin restarted today. Denies pain, SOB. Ambulating halls. Urology signed off. Discharge tomorrow pending labs.     Goal Outcome Evaluation:      Plan of Care Reviewed With: patient    Overall Patient Progress: no changeOverall Patient Progress: no change    Outcome Evaluation: no changes    Problem: Adult Inpatient Plan of Care  Goal: Plan of Care Review  Description: The Plan of Care Review/Shift note should be completed every shift.  The Outcome Evaluation is a brief statement about your assessment that the patient is improving, declining, or no change.  This information will be displayed automatically on your shift  note.  Outcome: Progressing  Flowsheets (Taken 11/1/2024 1817)  Outcome Evaluation: no changes  Plan of Care Reviewed With: patient  Overall Patient Progress: no change  Goal: Patient-Specific Goal (Individualized)  Description: You can add care plan individualizations to a care plan. Examples of Individualization might be:  \"Parent requests to be called daily at 9am for status\", \"I have a hard time hearing out of my right ear\", or \"Do not touch me to wake me up as it startles  me\".  Outcome: Progressing  Goal: Absence of Hospital-Acquired Illness or Injury  Outcome: Progressing  Intervention: Identify and Manage Fall Risk  Recent Flowsheet Documentation  Taken 11/1/2024 0849 by Tawanna Ochoa, RN  Safety Promotion/Fall Prevention: safety round/check completed  Intervention: Prevent Skin Injury  Recent Flowsheet Documentation  Taken 11/1/2024 0849 by Tawanna Ochoa, RN  Body Position: position changed independently  Intervention: Prevent and Manage VTE (Venous Thromboembolism) Risk  Recent " Flowsheet Documentation  Taken 11/1/2024 0849 by Tawanna Ochoa RN  VTE Prevention/Management: SCDs off (sequential compression devices)  Intervention: Prevent Infection  Recent Flowsheet Documentation  Taken 11/1/2024 0849 by Tawanna Ochoa RN  Infection Prevention:   single patient room provided   rest/sleep promoted   hand hygiene promoted  Goal: Optimal Comfort and Wellbeing  Outcome: Progressing  Goal: Readiness for Transition of Care  Outcome: Progressing     Problem: Fall Injury Risk  Goal: Absence of Fall and Fall-Related Injury  Outcome: Progressing  Intervention: Identify and Manage Contributors  Recent Flowsheet Documentation  Taken 11/1/2024 0849 by Tawanna Ochoa RN  Medication Review/Management: medications reviewed  Intervention: Promote Injury-Free Environment  Recent Flowsheet Documentation  Taken 11/1/2024 0849 by Tawanna Ochoa RN  Safety Promotion/Fall Prevention: safety round/check completed     Problem: Comorbidity Management  Goal: Blood Pressure in Desired Range  Outcome: Progressing  Intervention: Maintain Blood Pressure Management  Recent Flowsheet Documentation  Taken 11/1/2024 0849 by Tawanna Ochoa RN  Medication Review/Management: medications reviewed     Problem: Urinary Retention  Goal: Effective Urinary Elimination  Outcome: Progressing

## 2024-11-01 NOTE — PROGRESS NOTES
[facility]    Medicine Progress Note - Hospitalist Service    Date of Admission:  10/31/2024    Assessment & Plan   Celia Stallings is a 79 year old male admitted on 10/31/2024 with complaint of lower abd pain, decreased UO and recurrent hematuria.  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    PMH is notable for hx urethral stricture, s/p dilation, chronic anticoagulation with Warfarin (goal INR 2.5-3.5), a fib/flutter, mild chronic anemia, HTN. He had an episode of Endocarditis in 2021 and remote Mitral and Aortic Valves replaced in 1997 and 1999 respectively. He follows at / Cardiology.  The patient also has a history of nephrolithiasis, BPH status post photoselective vaporization (PVP) of the prostate with meatal dilation in 2022, penile implant for ED and urethral stricture, s/p balloon dilation of proximal penile urethra. He has been intermittently self-catheterizing several times daily.    Chart review indicates that the pt had been hospitalized here for urinary obstruction due to hematuria on 10/5 and was discharged later that day with a grullon catheter in place. He followed up with his [facility] by phone and ultimately went in on 10/9 for TOV.  He was discharged with a plan to resume intermittent self-cathing.     On presentation to the ED, VS: /78, HR 74, RR 22 with oxygen saturation 95% breathing room air.  Temperature 98  F.  Labs: Creatinine 1.4, BUN 29, carbon dioxide 21.  Glucose 190.  WBC 12.3, Hgb 12.4 with MCV 90 and .  INR 4.65.  Urinalysis is grossly bloody  POC ultrasound of the bladder showed a distended with debris consistent with blood obstructing bladder outlet. Although it was achieved with some difficulty due to pt's known urethral stricture, a Grullon catheter was placed. A three-way irrigation cathter could not be passed, so a 14 French was placed and has been manageable with hand-flushing as needed.     On  10/31, the pt had become quite confused and with a fever of 103, he was started on antibiotics after blood cultures were obtained. Today, the pt was insistent that he had not been confused yesterday, and was planning on leaving AMA. I spoke with his son, reviewed the hx and he was able to convince the pt to stay until we confirmed that he was no longer having fevers, was clear-headed and until we knew whether he had an infection.     Diagnoses:  Bladder outlet obstruction due to hematuria.  History of urethral stricture and chronic anticoagulation.  This is a recurrent issue for Mr. Stallings.  Supratherapeutic INR.  Patient is chronically anticoagulated for mechanical mitral and aortic valves.  VERONICA.  Baseline creat is 1.2 as of 2 weeks ago, normal before that.   Fever.  Patient has a history of endocarditis with Enterococcus faecium.  Blood culture obtained, and neg to date. UC grew pan-sensitive E coli.  Encephalopathy, present on 10/31 now appears improved.   Plan:  1.  Ceftriaxone was started, but due to concern for remote Enterococcal endocarditis, was switched to Zosyn.  Now, E coli is the identified pathogen, will switch back to Ceftriaxone.   2.  Warfarin is resumed, now that INR came down to therapeutic level.   3.  Flush catheter and plan for discharge home with Mohan in place. Follow up with [facility].   4.  Pt was given a single liter of LR on the date of admission.   5.  Strict I and O.    Anticipate discharge to home tomorrow (lives with son, Eddie) to complete a week of abx. Will need close follow up for INR also.          Diet: Regular Diet Adult    DVT Prophylaxis: on warfarin, but not reversing due to risk. Dez resume when in therapeutic range.   Mohan Catheter: PRESENT, indication: Acute retention or obstruction  Lines: None     Cardiac Monitoring: ACTIVE order. Indication: Tachyarrhythmias, acute (48 hours)  Code Status: Full Code      Clinically Significant Risk Factors Present on Admission     "            # Drug Induced Coagulation Defect: home medication list includes an anticoagulant medication    # Hypertension: Noted on problem list         # Overweight: Estimated body mass index is 28.7 kg/m  as calculated from the following:    Height as of this encounter: 1.778 m (5' 10\").    Weight as of this encounter: 90.7 kg (200 lb).              Disposition Plan     Medically Ready for Discharge: Anticipated in 2-4 Days      Keyur Linton MD  Hospitalist Service  [facility]  Securely message with [employer] (more info)  Text page via [employer] Paging/Directory   ______________________________________________________________________    Interval History   Stable night. Low grade fevers after the high-grade fever at 15:30 yesterday. Blood and urine cx pending.    Pt was insistent that he was going to go home today. I called his son, Eddie, who convinced him to stay.    Physical Exam   Vital Signs: Temp: 97.2  F (36.2  C) Temp src: Oral BP: 137/56 Pulse: 64   Resp: 18 SpO2: 94 % O2 Device: None (Room air)    Weight: 200 lbs 0 oz    General Appearance: alert and interactive. Appears appropriate, if confrontational.   Respiratory: No increased work of breathing.  Clear to auscultation throughout.  Cardiovascular: RRR without murmur. Accentuated S2.  GI: Soft, nontender, nondistended.  Skin: No obvious lesions appreciated.  There are many areas of macular hypopigmentation but no active sores.  Other: Muscle tone and bulk appears to be within normal limits.  The patient does not appear to be tracking.  Mild lower extremity edema.  Well-perfused.    Medical Decision Making       50 MINUTES SPENT BY ME on the date of service doing chart review, history, exam, documentation & further activities per the note.      Data   Results for orders placed or performed during the hospital encounter of 10/31/24 (from the past 24 hours)   Glucose by meter   Result Value Ref Range    GLUCOSE BY METER POCT 141 (H) 70 " - 99 mg/dL   Blood Culture Hand, Right    Specimen: Hand, Right; Blood   Result Value Ref Range    Culture No growth after 12 hours    Echocardiogram Complete   Result Value Ref Range    LVEF  65-70%     Narrative    [id number]  [id number]  [id number]  [id number]^HERVE^NATALY^ASMITA     [facility]  Echocardiography Laboratory  [address] [place] [address]     Name: JAKE RINCON  MRN: [id number]  : 1945  Study Date: 10/31/2024 02:20 PM  Age: 79 yrs  Gender: Male  Patient Location: [facility]  Reason For Study: Aortic Valve Replacement, Mitral Valve Disorder,  Endocarditis  Ordering Physician: NATALY EDGAR  Referring Physician: Hilton Rebolledo  Performed By: Maribell Asencio RDCS     BSA: 2.1 m2  Height: 70 in  Weight: 200 lb  HR: 87  BP: 146/73 mmHg  ______________________________________________________________________________  Procedure  Complete Portable Echo Adult. Definity (NDC #81141-442) given intravenously.  ______________________________________________________________________________  Interpretation Summary     There is a mechanical mitral valve.  The visual ejection fraction is 65-70%.  There is moderate to severe concentric left ventricular hypertrophy.  There is a mechanical aortic valve.  No sign of significant prosthetic valvular dysfunction but TTE in general  cannot rule out presence of vegetations in prostthetic mitral and aortic  valves as they are in close proximity to each other and there is very  extensive acoustic interference.  ______________________________________________________________________________  Left Ventricle  The left ventricle is normal in size. There is moderate to severe concentric  left ventricular hypertrophy. The visual ejection fraction is 65-70%.     Right Ventricle  The right ventricle is normal in structure, function and size.     Atria  There is mild biatrial enlargement.     Mitral Valve  Mean gradient of 8 mmHg with HR 80-90. There is a mechanical  mitral valve.  Normal prosthetic mitral valve gradients.     Tricuspid Valve  Normal tricuspid valve. Moderate (46-55mmHg) pulmonary hypertension is  present.     Aortic Valve  There is mild (1+) aortic regurgitation. The mean AoV pressure gradient is  16mmHg. There is a mechanical aortic valve.     Pulmonic Valve  The pulmonic valve is not well seen, but is grossly normal.     Vessels  Aortic root dilatation is present. Ascending aorta dilatation is present.     Pericardium  There is no pericardial effusion.     ______________________________________________________________________________  MMode/2D Measurements & Calculations  IVSd: 1.7 cm  LVIDd: 3.9 cm  LVIDs: 2.3 cm  LVPWd: 1.4 cm  IVC diam: 2.6 cm  FS: 41.4 %  LV mass(C)d: 231.7 grams  LV mass(C)dI: 111.0 grams/m2     Ao root diam: 4.2 cm  asc Aorta Diam: 4.0 cm  Ao root diam index Ht(cm/m): 2.4  Ao root diam index BSA (cm/m2): 2.0  Asc Ao diam index BSA (cm/m2): 1.9  Asc Ao diam index Ht(cm/m): 2.3  LA Volume (BP): 118.0 ml  LA Volume Index (BP): 56.5 ml/m2  RV Base: 5.0 cm  RWT: 0.72     TAPSE: 1.2 cm     Doppler Measurements & Calculations  MV E max james: 171.2 cm/sec  MV max P.5 mmHg  MV mean P.8 mmHg  MV V2 VTI: 40.5 cm  MV P1/2t max james: 206.4 cm/sec  MV P1/2t: 86.7 msec  MVA(P1/2t): 2.5 cm2  MV dec slope: 696.9 cm/sec2  MV dec time: 0.24 sec  Ao V2 max: 273.7 cm/sec  Ao max P.0 mmHg  Ao V2 mean: 187.3 cm/sec  Ao mean P.3 mmHg  Ao V2 VTI: 44.0 cm  LV V1 max P.5 mmHg  LV V1 max: 117.3 cm/sec  LV V1 VTI: 19.9 cm  PA acc time: 0.08 sec  TR max james: 313.8 cm/sec  TR max P.6 mmHg  AV James Ratio (DI): 0.43  RV S James: 9.5 cm/sec     ______________________________________________________________________________  Report approved by: Jose Chaves 10/31/2024 03:38 PM         Glucose by meter   Result Value Ref Range    GLUCOSE BY METER POCT 119 (H) 70 - 99 mg/dL

## 2024-11-01 NOTE — PROGRESS NOTES
[facility] Progress Note          Assessment and Plan:     Assessment:    Gross hematuria    Urinary retention with incomplete bladder emptying    Supratherapeutic INR    Sepsis without acute organ dysfunction (H)    Metabolic encephalopathy    Penile prosthesis    Incomplete bladder emptying requiring intermittent catheterization 1-2 times daily    Hypertension    Obstructive sleep apnea    Urethral stricture      Plan:   -Continue with antibiotics.  Follow-up urine culture.  Sensitivities are still pending.  Would recommend patient remain until urine culture sensitivities have returned.  Then should be discharged with 7 to 10 days of antibiotics total given urinary system manipulation.  -Will plan on discharging with Mohan catheter.  Patient should follow-up with his primary urologist regarding removal of this and long-term plan regarding his urethral stricture and intermittent cathing due to incomplete bladder emptying.  -Hand irrigate Mohan catheter as needed for decreased output, bladder spasms, or clots.  -Okay to restart warfarin, is now therapeutic.  -Anticipate likely discharge tomorrow.  Patient does note that he wants to go home today, but given high fever yesterday of 103  F, delirium and confusion, and positive urine culture with worsening leukocytosis, would recommend that he stay from a urology standpoint.  -Once his urine cultures have returned, likely tomorrow, okay to discharge from urology perspective on antibiotics with Mohan catheter in place.  -Will plan on signing off.  Please contact us with any urological concerns.    Nicole Acevedo PA-C   [facility]  [phone]               Interval History:     Doing well today.  Mohan catheter in place draining clear yellow issue claire urine.  Denies N/V/F/C/SOB/CP.  Patient had significant chills yesterday and was confused and somewhat delirious at 1 point requiring a sitter.  Patient denies remembering this  happening.  Urine culture is currently showing 50,000-100,000 CFU per mL of E. coli.  Blood culture no growth today.  On IV Zosyn.  Tmax 103, currently afebrile.  Tachycardia has resolved.  WBC 13.8 (12.3).  Creatinine 1.37 EGFR 52 (1.40 EGFR 50).  INR is now therapeutic at 2.47.  Hemoglobin 10.4 (12.4).              Review of Systems:     The 5 point Review of Systems is negative other than noted in the HPI             Medications:     Current Facility-Administered Medications   Medication Dose Route Frequency Provider Last Rate Last Admin    acetaminophen (TYLENOL) tablet 650 mg  650 mg Oral Q4H PRN Rick Whitten MD   650 mg at 10/31/24 2225    Or    acetaminophen (TYLENOL) Suppository 650 mg  650 mg Rectal Q4H PRN Rick Whitten MD        HYDROmorphone (DILAUDID) half-tab 1 mg  1 mg Oral Q4H PRN iRck Whitten MD        HYDROmorphone (DILAUDID) injection 0.2 mg  0.2 mg Intravenous Q2H PRN Rick Whitten MD        HYDROmorphone (DILAUDID) injection 0.4 mg  0.4 mg Intravenous Q2H PRN Rick Whitten MD        HYDROmorphone (DILAUDID) tablet 2 mg  2 mg Oral Q4H PRN Rick Whitten MD        lidocaine (XYLOCAINE) 2 % external gel 6 mL  6 mL Urethral Once Rick Whitten MD        lidocaine (XYLOCAINE) 2 % external gel 6 mL  6 mL Urethral Once Rick Whitten MD        [Held by provider] losartan (COZAAR) tablet 50 mg  50 mg Oral Daily Rick Whitten MD        melatonin tablet 1 mg  1 mg Oral At Bedtime PRN Rick Whitten MD   1 mg at 10/31/24 2316    naloxone (NARCAN) injection 0.2 mg  0.2 mg Intravenous Q2 Min PRN Rick Whitten MD        Or    naloxone (NARCAN) injection 0.4 mg  0.4 mg Intravenous Q2 Min PRN Rick Whitten MD        Or    naloxone (NARCAN) injection 0.2 mg  0.2 mg Intramuscular Q2 Min PRN Rick Whitten MD        Or    naloxone (NARCAN) injection 0.4 mg  0.4 mg Intramuscular Q2 Min PRN Rick Whitten  "MD Brenden        ondansetron (ZOFRAN ODT) ODT tab 4 mg  4 mg Oral Q6H PRN Rick Whitten MD        Or    ondansetron (ZOFRAN) injection 4 mg  4 mg Intravenous Q6H PRN Rick Whitten MD        Patient is already receiving anticoagulation with heparin, enoxaparin (LOVENOX), warfarin (COUMADIN)  or other anticoagulant medication   Does not apply Continuous PRN Rick Whitten MD        piperacillin-tazobactam (ZOSYN) 3.375 g vial to attach to  mL bag  3.375 g Intravenous Q6H Keyur Linton  mL/hr at 11/01/24 1045 3.375 g at 11/01/24 1045    polyethylene glycol (MIRALAX) Packet 17 g  17 g Oral BID PRN Rick Whitten MD        senna-docusate (SENOKOT-S/PERICOLACE) 8.6-50 MG per tablet 1 tablet  1 tablet Oral BID PRN Rick Whitten MD        Or    senna-docusate (SENOKOT-S/PERICOLACE) 8.6-50 MG per tablet 2 tablet  2 tablet Oral BID PRN Rick Whitten MD        Warfarin Dose Required Daily - Pharmacist Managed  1 each Oral See Admin Instructions Keyur Linton MD                      Physical Exam:   Vitals were reviewed  Patient Vitals for the past 8 hrs:   BP Temp Temp src Pulse Resp SpO2   11/01/24 0845 130/49 97.9  F (36.6  C) Oral 77 18 98 %   11/01/24 0742 -- -- -- 64 -- --   11/01/24 0406 137/56 97.2  F (36.2  C) Oral 64 18 94 %     GEN: NAD, lying in bed  EYES: EOMI  MOUTH: MMM  NECK: Supple  RESP: Unlabored breathing  SKIN: Warm  ABD: soft  NEURO: AAO X3  URO: Mohan catheter in place draining clear yellow urine.           Data:   No results found for: \"NTBNPI\", \"NTBNP\"  Lab Results   Component Value Date    WBC 13.8 (H) 11/01/2024    WBC 12.3 (H) 10/31/2024    WBC 6.4 10/05/2024    HGB 10.4 (L) 11/01/2024    HGB 12.4 (L) 10/31/2024    HGB 10.9 (L) 10/05/2024    HCT 33.1 (L) 11/01/2024    HCT 39.4 (L) 10/31/2024    HCT 33.4 (L) 10/05/2024    MCV 90 11/01/2024    MCV 90 10/31/2024    MCV 89 10/05/2024     (L) 11/01/2024     10/31/2024    "  10/05/2024     Lab Results   Component Value Date    INR 2.47 (H) 11/01/2024    INR 4.65 (H) 10/31/2024    INR 4.49 (H) 10/05/2024      All cultures:  Recent Labs   Lab 10/31/24  1304 10/31/24  0510   CULTURE No growth after 12 hours 50,000-100,000 CFU/mL Escherichia coli*

## 2024-11-01 NOTE — CARE PLAN
Notified provider about indwelling grullon catheter discussed removal or continued need.    Did provider choose to remove indwelling grullon catheter? NO    Provider's grullon indication for keeping indwelling grullon catheter: Indication for continued use: Retention    Is there an order for indwelling grullon catheter? YES    *If there is a plan to keep grullon catheter in place at discharge daily notification with provider is not necessary, but please add a notation in the treatment team sticky note that the patient will be discharging with the catheter.      Discharge with grullon.

## 2024-11-02 VITALS
BODY MASS INDEX: 28.63 KG/M2 | OXYGEN SATURATION: 96 % | HEART RATE: 61 BPM | DIASTOLIC BLOOD PRESSURE: 55 MMHG | SYSTOLIC BLOOD PRESSURE: 132 MMHG | RESPIRATION RATE: 18 BRPM | HEIGHT: 70 IN | WEIGHT: 200 LBS | TEMPERATURE: 98.1 F

## 2024-11-02 LAB
ANION GAP SERPL CALCULATED.3IONS-SCNC: 13 MMOL/L (ref 7–15)
BUN SERPL-MCNC: 16.1 MG/DL (ref 8–23)
CALCIUM SERPL-MCNC: 8.9 MG/DL (ref 8.8–10.4)
CHLORIDE SERPL-SCNC: 104 MMOL/L (ref 98–107)
CREAT SERPL-MCNC: 1.09 MG/DL (ref 0.67–1.17)
EGFRCR SERPLBLD CKD-EPI 2021: 68 ML/MIN/1.73M2
ERYTHROCYTE [DISTWIDTH] IN BLOOD BY AUTOMATED COUNT: 14.1 % (ref 10–15)
GLUCOSE SERPL-MCNC: 109 MG/DL (ref 70–99)
HCO3 SERPL-SCNC: 21 MMOL/L (ref 22–29)
HCT VFR BLD AUTO: 31.1 % (ref 40–53)
HGB BLD-MCNC: 9.8 G/DL (ref 13.3–17.7)
INR PPP: 1.79 (ref 0.85–1.15)
MCH RBC QN AUTO: 28.2 PG (ref 26.5–33)
MCHC RBC AUTO-ENTMCNC: 31.5 G/DL (ref 31.5–36.5)
MCV RBC AUTO: 90 FL (ref 78–100)
PLATELET # BLD AUTO: 147 10E3/UL (ref 150–450)
POTASSIUM SERPL-SCNC: 4 MMOL/L (ref 3.4–5.3)
RBC # BLD AUTO: 3.47 10E6/UL (ref 4.4–5.9)
SODIUM SERPL-SCNC: 138 MMOL/L (ref 135–145)
WBC # BLD AUTO: 8.8 10E3/UL (ref 4–11)

## 2024-11-02 PROCEDURE — 80048 BASIC METABOLIC PNL TOTAL CA: CPT | Performed by: INTERNAL MEDICINE

## 2024-11-02 PROCEDURE — 99239 HOSP IP/OBS DSCHRG MGMT >30: CPT | Performed by: STUDENT IN AN ORGANIZED HEALTH CARE EDUCATION/TRAINING PROGRAM

## 2024-11-02 PROCEDURE — 250N000013 HC RX MED GY IP 250 OP 250 PS 637: Performed by: INTERNAL MEDICINE

## 2024-11-02 PROCEDURE — 250N000011 HC RX IP 250 OP 636: Performed by: STUDENT IN AN ORGANIZED HEALTH CARE EDUCATION/TRAINING PROGRAM

## 2024-11-02 PROCEDURE — 36415 COLL VENOUS BLD VENIPUNCTURE: CPT | Performed by: INTERNAL MEDICINE

## 2024-11-02 PROCEDURE — 85610 PROTHROMBIN TIME: CPT | Performed by: INTERNAL MEDICINE

## 2024-11-02 PROCEDURE — 85027 COMPLETE CBC AUTOMATED: CPT | Performed by: INTERNAL MEDICINE

## 2024-11-02 RX ORDER — WARFARIN SODIUM 7.5 MG/1
7.5 TABLET ORAL DAILY
Qty: 2 TABLET | Refills: 0 | Status: SHIPPED | OUTPATIENT
Start: 2024-11-02 | End: 2024-11-04

## 2024-11-02 RX ORDER — ENOXAPARIN SODIUM 100 MG/ML
1 INJECTION SUBCUTANEOUS EVERY 12 HOURS
Qty: 5.4 ML | Refills: 0 | Status: SHIPPED | OUTPATIENT
Start: 2024-11-02 | End: 2024-11-05

## 2024-11-02 RX ORDER — CEFUROXIME AXETIL 500 MG/1
500 TABLET ORAL 2 TIMES DAILY
Qty: 8 TABLET | Refills: 0 | Status: SHIPPED | OUTPATIENT
Start: 2024-11-02 | End: 2024-11-06

## 2024-11-02 RX ORDER — ENOXAPARIN SODIUM 100 MG/ML
1 INJECTION SUBCUTANEOUS EVERY 12 HOURS
Status: DISCONTINUED | OUTPATIENT
Start: 2024-11-02 | End: 2024-11-02 | Stop reason: HOSPADM

## 2024-11-02 RX ADMIN — HYDROMORPHONE HYDROCHLORIDE 2 MG: 2 TABLET ORAL at 00:26

## 2024-11-02 RX ADMIN — ENOXAPARIN SODIUM 90 MG: 100 INJECTION SUBCUTANEOUS at 11:23

## 2024-11-02 ASSESSMENT — ACTIVITIES OF DAILY LIVING (ADL)
ADLS_ACUITY_SCORE: 0

## 2024-11-02 NOTE — PLAN OF CARE
Notified provider about indwelling grullon catheter discussed removal or continued need.    Did provider choose to remove indwelling grullon catheter? NO    Provider's grullon indication for keeping indwelling grullon catheter: Retention, hematuria     Is there an order for indwelling grullon catheter? YES    *If there is a plan to keep grullon catheter in place at discharge daily notification with provider is not necessary, but please add a notation in the treatment team sticky note that the patient will be discharging with the catheter.

## 2024-11-02 NOTE — DISCHARGE SUMMARY
[facility]  Hospitalist Discharge Summary      Date of Admission:  10/31/2024  Date of Discharge:  11/2/2024 12:31 PM  Discharging Provider: Vega Carson DO  Discharge Service: Hospitalist Service    Discharge Diagnoses   Celia Stallings is a 79 year old male with PMH significant for hx urethral stricture, s/p dilation, chronic anticoagulation with Warfarin (goal INR 2.5-3.5), a fib/flutter, mild chronic anemia, HTN. He had an episode of Endocarditis in 2021 and remote Mitral and Aortic Valves replaced in 1997 and 1999 respectively, BPH, status post photoselective vaporization (PVP) of the prostate with meatal dilation in 2022, penile implant for ED and urethral stricture, s/p balloon dilation of proximal penile urethra who was admitted on 10/31/2024 with decreased UOP and hematuria.     POC ultrasound of the bladder showed a distended with debris consistent with blood obstructing bladder outlet.  A three-way irrigation cathter could not be passed, so a 14 French was placed and was manageable with hand-flushing as needed.     On 10/31, the pt had become quite confused and with a fever of 103, he was started on antibiotics after blood cultures were obtained.  Urine consistent with URI.  Culture grows e coli.  Given ceftri inpatient. Cefuroxime on discharge.      WARD due to hematuria in the setting of supratherapeutic INR related to coumadin use:  Hx urethral stricture.  On anticoagulation for valve replacement.  Urology consulted.  Gurllon placed with irrigation.  Anticoag held.  Bleeding stopped.  Will discharge with grullon per urology recs.    Urinary tract infection:  Most likely related to WARD in the setting of hematuria.  Not related to cauti as the patient did not have grullon catheter prior to grullon being placed.  E coli.  Plan for course cefuroxime.     Supratherapeutic INR  Hx mitral and av replacement:  INR goal 2.5-3.5.  INR 4.65 on presentation.  Coumadin held given bleeding.   "Dropped to 1.7 prior to discharge.  Will bridge with enoxaparin back to coumadin.  Patient will have inr check on Monday.  Coumadin 7.5 today and tomorrow.  After discussion with pharmacy.     Acute metabolic & toxic encephalopathy, resolved:  Likely related to acute illness.  Possible delirium.  Resolved prior to discharge.       Clinically Significant Risk Factors     # Overweight: Estimated body mass index is 28.7 kg/m  as calculated from the following:    Height as of this encounter: 1.778 m (5' 10\").    Weight as of this encounter: 90.7 kg (200 lb).       Follow-ups Needed After Discharge   Follow-up Appointments       Follow-up and recommended labs and tests       You need to follow up with your Urologist in the next couple of weeks for a trial of void and removal of the catheter.   You also need to see your Anticoagulation clinic as soon as available to recheck the INR.                Unresulted Labs Ordered in the Past 30 Days of this Admission       Date and Time Order Name Status Description    10/31/2024 12:04 PM Blood Culture Hand, Right Preliminary         These results will be followed up by PCP    Discharge Disposition   Discharged to home  Condition at discharge: Stable    Consultations This Hospital Stay   UROLOGY IP CONSULT  PHARMACY TO DOSE WARFARIN  PHARMACY TO DOSE WARFARIN  CARE MANAGEMENT / SOCIAL WORK IP CONSULT    Code Status   Full Code    Time Spent on this Encounter   I, Vega Carson DO, personally saw the patient today and spent greater than 30 minutes discharging this patient.       Vega Carson DO  [place] [address] [facility]  [address] [place] [address]  Phone: [phone]  Fax: [phone]  ______________________________________________________________________    Physical Exam   Vital Signs: Temp: 98.1  F (36.7  C) Temp src: Oral BP: 132/55 Pulse: 61   Resp: 18 SpO2: 96 % O2 Device: None (Room air)    Weight: 200 lbs 0 oz  General Appearance: " Patient awake & alert.  No apparent distress.   Respiratory: Lungs are CTAB.  Work of breathing appears normal on room air.  Cardiovascular: Regular rate and rhythm.  No murmurs rubs or gallops.  There is no edema present.  GI: Benign.  Soft.  Non-tender.  Bowel sounds active.   Skin: No rashes or lesions exposed skin.  Neuro:  The patient is moving all extremities.  No obvious focal asymmetries.   Other: Patient is appropriate and oriented x3.        Primary Care Physician   Hilton Rebolledo    Discharge Orders      Reason for your hospital stay    You were admitted with urinary obstruction due to clots in your bladder. While you were here, you had periods of confusion, a high fever and your urine culture grew E coli.     Follow-up and recommended labs and tests     You need to follow up with your Urologist in the next couple of weeks for a trial of void and removal of the catheter.   You also need to see your Anticoagulation clinic as soon as available to recheck the INR.     Activity    Your activity upon discharge: activity as tolerated     Diet    Follow this diet upon discharge: Regular       Significant Results and Procedures   Most Recent 3 CBC's:  Recent Labs   Lab Test 11/02/24  0751 11/01/24  0728 10/31/24  0325   WBC 8.8 13.8* 12.3*   HGB 9.8* 10.4* 12.4*   MCV 90 90 90   * 141* 221     Most Recent 3 BMP's:  Recent Labs   Lab Test 11/02/24  0751 11/01/24  0728 10/31/24  1535 10/31/24  1200 10/31/24  0325    136  --   --  135   POTASSIUM 4.0 4.1  --   --  4.4   CHLORIDE 104 104  --   --  101   CO2 21* 22  --   --  21*   BUN 16.1 20.6  --   --  28.9*   CR 1.09 1.37*  --   --  1.40*   ANIONGAP 13 10  --   --  13   RICKI 8.9 8.7*  --   --  9.8   * 114* 119*   < > 190*    < > = values in this interval not displayed.       Discharge Medications   Current Discharge Medication List        START taking these medications    Details   cefuroxime (CEFTIN) 500 MG tablet Take 1 tablet (500 mg) by mouth  2 times daily for 4 days.  Qty: 8 tablet, Refills: 0    Associated Diagnoses: Acute cystitis with hematuria      enoxaparin ANTICOAGULANT (LOVENOX) 100 MG/ML syringe Inject 0.9 mLs (90 mg) subcutaneously every 12 hours for 3 days. You should take this medication until your warfarin is therapeutic (INR 2.5-3.5).  Qty: 5.4 mL, Refills: 0    Associated Diagnoses: H/O mitral valve replacement with mechanical valve           CONTINUE these medications which have CHANGED    Details   warfarin ANTICOAGULANT (COUMADIN) 7.5 MG tablet Take 1 tablet (7.5 mg) by mouth daily for 2 days.  Qty: 2 tablet, Refills: 0    Associated Diagnoses: H/O mitral valve replacement with mechanical valve           CONTINUE these medications which have NOT CHANGED    Details   APPLE CIDER VINEGAR PO Take 1 chew tab by mouth daily.      BEE POLLEN PO Take by mouth daily.      Cyanocobalamin (VITAMIN B-12 PO) Take by mouth daily.      Ginkgo Biloba (GINKOBA PO) Take by mouth daily.      Green Tea, Aurora sinensis, (GREEN TEA PO) Take by mouth daily.      magnesium 250 MG tablet Take 1 tablet by mouth daily.      multivitamin, therapeutic (THERA-VIT) TABS tablet Take 1 tablet by mouth 2 times daily.      Omega-3 Fatty Acids (FISH OIL OMEGA-3 PO) Take by mouth daily.      vitamin C (ASCORBIC ACID) 250 MG tablet Take 250 mg by mouth 2 times daily.      vitamin D3 (CHOLECALCIFEROL) 50 mcg (2000 units) tablet Take 1 tablet by mouth daily.           STOP taking these medications       losartan (COZAAR) 50 MG tablet Comments:   Reason for Stopping:             Allergies   Allergies   Allergen Reactions    Lisinopril Cough

## 2024-11-02 NOTE — PLAN OF CARE
"Pt is alert and calm. He seems confused. He is on room air. Leg pain. Given tylenol and dilaudid. Tele- Afib ACR.  Independent on room air. Regular diet. Kimberlee has clear urine. No clots on this visit. Rocephin q24hr. Possible discharge today.     Problem: Adult Inpatient Plan of Care  Goal: Plan of Care Review  Description: The Plan of Care Review/Shift note should be completed every shift.  The Outcome Evaluation is a brief statement about your assessment that the patient is improving, declining, or no change.  This information will be displayed automatically on your shift  note.  Outcome: Progressing  Flowsheets (Taken 11/2/2024 0055)  Outcome Evaluation: Pt given tylenol and dilaudid for leg pain. Melatonin to help with sleep. PIV- SL. Independent in room. zosyn q6h  Plan of Care Reviewed With: patient  Overall Patient Progress: no change  Goal: Patient-Specific Goal (Individualized)  Description: You can add care plan individualizations to a care plan. Examples of Individualization might be:  \"Parent requests to be called daily at 9am for status\", \"I have a hard time hearing out of my right ear\", or \"Do not touch me to wake me up as it startles  me\".  Outcome: Progressing  Goal: Absence of Hospital-Acquired Illness or Injury  Outcome: Progressing  Intervention: Identify and Manage Fall Risk  Recent Flowsheet Documentation  Taken 11/1/2024 2151 by Angelica Suarez, RN  Safety Promotion/Fall Prevention:   safety round/check completed   room organization consistent   room near nurse's station   nonskid shoes/slippers when out of bed   mobility aid in reach  Intervention: Prevent Skin Injury  Recent Flowsheet Documentation  Taken 11/1/2024 2151 by Angelica Suarez, RN  Body Position: position changed independently  Intervention: Prevent and Manage VTE (Venous Thromboembolism) Risk  Recent Flowsheet Documentation  Taken 11/1/2024 2151 by Angelica Suarez RN  VTE Prevention/Management: SCDs off (sequential compression " devices)  Intervention: Prevent Infection  Recent Flowsheet Documentation  Taken 11/1/2024 2151 by Angelica Suarez, RN  Infection Prevention:   single patient room provided   rest/sleep promoted   hand hygiene promoted  Goal: Optimal Comfort and Wellbeing  Outcome: Progressing  Goal: Readiness for Transition of Care  Outcome: Progressing     Problem: Fall Injury Risk  Goal: Absence of Fall and Fall-Related Injury  Outcome: Progressing  Intervention: Identify and Manage Contributors  Recent Flowsheet Documentation  Taken 11/1/2024 2151 by Angelica Suarez, RN  Medication Review/Management: medications reviewed  Intervention: Promote Injury-Free Environment  Recent Flowsheet Documentation  Taken 11/1/2024 2151 by Angelica Suarez, RN  Safety Promotion/Fall Prevention:   safety round/check completed   room organization consistent   room near nurse's station   nonskid shoes/slippers when out of bed   mobility aid in reach     Problem: Urinary Retention  Goal: Effective Urinary Elimination  Outcome: Progressing     Problem: Comorbidity Management  Goal: Blood Pressure in Desired Range  Outcome: Progressing  Intervention: Maintain Blood Pressure Management  Recent Flowsheet Documentation  Taken 11/1/2024 2151 by Angelica Suarez, RN  Medication Review/Management: medications reviewed   Goal Outcome Evaluation:      Plan of Care Reviewed With: patient    Overall Patient Progress: no changeOverall Patient Progress: no change    Outcome Evaluation: Pt given tylenol and dilaudid for leg pain. Melatonin to help with sleep. PIV- SL. Independent in room. zosyn q6h

## 2024-11-02 NOTE — PLAN OF CARE
Goal Outcome Evaluation:      Plan of Care Reviewed With: patient    Outcome Evaluation: Pt A&O. Denies pain. Transfers ind, refusing alarms/assistance. Tolerating renal diet. Showered. Pt verbalized understanding of catheter care/leg bag at home, and pt verbalized understanding and demonstrated proper technique for lovenox injections.    Patient cleared for discharge to home today per MD. Discharge instructions, medications, and follow-ups reviewed with patient in detail. Discharge medications, including Lovenox, warfarin and Ceftin were filled here and given to patient. Patient verbalized understanding of discharge instructions. Belongings were returned to patient at time of discharge. Family providing transport home.      Problem: Adult Inpatient Plan of Care  Goal: Plan of Care Review  Description: The Plan of Care Review/Shift note should be completed every shift.  The Outcome Evaluation is a brief statement about your assessment that the patient is improving, declining, or no change.  This information will be displayed automatically on your shift  note.  Outcome: Met  Flowsheets (Taken 11/2/2024 1116)  Outcome Evaluation: Pt A&O. Denies pain. Transfers ind, refusing alarms/assistance. Tolerating renal diet. Showered. Pt verbalized understanding of catheter care/leg bag at home, and pt verbalized understanding and demonstrated proper technique for lovenox injections.  Plan of Care Reviewed With: patient  Goal: Absence of Hospital-Acquired Illness or Injury  Outcome: Met  Intervention: Identify and Manage Fall Risk  Recent Flowsheet Documentation  Taken 11/2/2024 0802 by Nora Jameson, RN  Safety Promotion/Fall Prevention:   clutter free environment maintained   increased rounding and observation   increase visualization of patient   lighting adjusted   mobility aid in reach   nonskid shoes/slippers when out of bed   safety round/check completed  Intervention: Prevent Skin Injury  Recent Flowsheet  Documentation  Taken 11/2/2024 0802 by Nora Jameson RN  Body Position: position changed independently  Taken 11/2/2024 0801 by Nora Jameson RN  Body Position: position changed independently  Intervention: Prevent and Manage VTE (Venous Thromboembolism) Risk  Recent Flowsheet Documentation  Taken 11/2/2024 0802 by Nora Jameson RN  VTE Prevention/Management: SCDs off (sequential compression devices)  Intervention: Prevent Infection  Recent Flowsheet Documentation  Taken 11/2/2024 0802 by Nora Jameson RN  Infection Prevention:   rest/sleep promoted   single patient room provided   hand hygiene promoted  Goal: Optimal Comfort and Wellbeing  Outcome: Met  Goal: Readiness for Transition of Care  Outcome: Met     Problem: Fall Injury Risk  Goal: Absence of Fall and Fall-Related Injury  Outcome: Met  Intervention: Identify and Manage Contributors  Recent Flowsheet Documentation  Taken 11/2/2024 0802 by Nora Jameson RN  Medication Review/Management: medications reviewed  Intervention: Promote Injury-Free Environment  Recent Flowsheet Documentation  Taken 11/2/2024 0802 by Nora Jameson RN  Safety Promotion/Fall Prevention:   clutter free environment maintained   increased rounding and observation   increase visualization of patient   lighting adjusted   mobility aid in reach   nonskid shoes/slippers when out of bed   safety round/check completed     Problem: Urinary Retention  Goal: Effective Urinary Elimination  Outcome: Met     Problem: Comorbidity Management  Goal: Blood Pressure in Desired Range  Outcome: Met  Intervention: Maintain Blood Pressure Management  Recent Flowsheet Documentation  Taken 11/2/2024 0802 by Nora Jameson RN  Medication Review/Management: medications reviewed

## 2024-11-02 NOTE — PHARMACY-ANTICOAGULATION SERVICE
Clinical Pharmacy- Warfarin Discharge Note    Indication: Atrial Fibrillation;Mechanical Mitral Valve Replacement  Therapy Goal: INR 2.5-3.5  Warfarin Prior to Admission: Yes  Warfarin PTA Regimen: Warfarin 10mg on Sundays and Wednesdays. Warfarin 7.5mg all other days    Anticoagulation Dose History  More data exists         Latest Ref Rng & Units 2/14/2022 8/21/2022 10/4/2024 10/5/2024 10/31/2024 11/1/2024 11/2/2024   Recent Dosing and Labs   warfarin ANTICOAGULANT (COUMADIN) 0.25 mg TABS quarter-tab - - - - - - 7.5 mg, $Given -   warfarin ANTICOAGULANT (COUMADIN) 5 MG tablet - 5 mg, $Given - - - - - -   INR 0.85 - 1.15 2.09  1.92  4.14  4.49  4.65  2.47  1.79        START taking    Dose / Directions       enoxaparin ANTICOAGULANT 100 MG/ML syringe  Commonly known as: LOVENOX  Used for: H/O [facility] Mitral Valve Replace 2013    Dose: 1 mg/kg  Inject 0.9 mLs (90 mg) subcutaneously every 12 hours for 3 days. You should take this medication until your warfarin is therapeutic (INR 2.5-3.5).  Quantity: 5.4 mL  Refills: 0         CONTINUE these medicines which may have CHANGED, or have new prescriptions. If we are uncertain of the size of tablets/capsules you have at home, strength may be listed as something that might have changed.    Dose / Directions   warfarin ANTICOAGULANT 7.5 MG tablet  Commonly known as: COUMADIN  This may have changed:  medication strength  how much to take  how to take this  when to take this  additional instructions  Used for: H/O [facility] Mitral Valve Replace 2013    Dose: 7.5 mg  Take 1 tablet (7.5 mg) by mouth daily for 2 days.  Quantity: 2 tablet  Refills: 0     Follow-up Appointments     Follow-up and recommended labs and tests     You need to follow up with your Urologist in the next couple of weeks for a trial of void and removal of the catheter.  You also need to see your Anticoagulation clinic as soon as available to recheck the INR.  You need any anticoagulation clinic visit/INR check on  Monday 11/4.  Until this visit, you should take warfarin 7.5 mg daily and enoxaparin injection twice daily.  Pending your INR check on Monday, your coumadin clinic should be able to tell you how to manage your medications.     Discharge plan above seems reasonable.

## 2024-11-05 ENCOUNTER — PATIENT OUTREACH (OUTPATIENT)
Dept: CARE COORDINATION | Facility: CLINIC | Age: 79
End: 2024-11-05
Payer: COMMERCIAL

## 2024-11-05 LAB — BACTERIA BLD CULT: NO GROWTH

## 2024-11-05 NOTE — PROGRESS NOTES
Connected Care Resource Center:   [facility] Care Resource [facility] Contact  [facility]/Voicemail     Clinical Data: Post-Discharge Outreach     Outreach attempted x 2.  Left message on patient's voicemail, providing [facility]'s central phone number of [phone] ([phone]) for questions/concerns and/or to schedule an appt with an [facility] provider, if they do not have a PCP.      Plan:  [facility] will do no further outreaches at this time.       CAROLEE Prieto  Connected Care Resource Center, [facility]    *Connected Care Resource Team does NOT follow patient ongoing. Referrals are identified based on internal discharge reports and the outreach is to ensure patient has an understanding of their discharge instructions.

## (undated) DEVICE — SOL WATER IRRIG 3000ML BAG 2B7117

## (undated) DEVICE — LINEN HALF SHEET 5512

## (undated) DEVICE — ESU ELEC LOOP 24FR 20750G

## (undated) DEVICE — PACK CYSTO CUSTOM RIDGES

## (undated) DEVICE — BAG URINARY DRAIN 4000ML LF 153509

## (undated) DEVICE — BAG CLEAR TRASH 1.3M 39X33" P4040C

## (undated) DEVICE — EVACUATOR BLADDER UROVAC LATEX M0067301250

## (undated) DEVICE — LABEL MEDICATION SYSTEM 3303-P

## (undated) DEVICE — TUBING IRRIG TUR Y TYPE 96" LF 6543-01

## (undated) DEVICE — LINEN FULL SHEET 5511

## (undated) DEVICE — SOL WATER IRRIG 1000ML BOTTLE 2F7114

## (undated) DEVICE — GLOVE PROTEXIS W/NEU-THERA 7.5  2D73TE75

## (undated) DEVICE — PREP SCRUB SOL EXIDINE 4% CHG 4OZ 29002-404

## (undated) DEVICE — CATH FOLEY 3WAY 24FR 30ML LUBRICATH LATEX 0167L24

## (undated) RX ORDER — DEXAMETHASONE SODIUM PHOSPHATE 4 MG/ML
INJECTION, SOLUTION INTRA-ARTICULAR; INTRALESIONAL; INTRAMUSCULAR; INTRAVENOUS; SOFT TISSUE
Status: DISPENSED
Start: 2022-02-11

## (undated) RX ORDER — FENTANYL CITRATE 50 UG/ML
INJECTION, SOLUTION INTRAMUSCULAR; INTRAVENOUS
Status: DISPENSED
Start: 2022-02-11

## (undated) RX ORDER — GLYCOPYRROLATE 0.2 MG/ML
INJECTION INTRAMUSCULAR; INTRAVENOUS
Status: DISPENSED
Start: 2022-02-11

## (undated) RX ORDER — FENTANYL CITRATE-0.9 % NACL/PF 10 MCG/ML
PLASTIC BAG, INJECTION (ML) INTRAVENOUS
Status: DISPENSED
Start: 2022-02-11

## (undated) RX ORDER — ONDANSETRON 2 MG/ML
INJECTION INTRAMUSCULAR; INTRAVENOUS
Status: DISPENSED
Start: 2022-02-11

## (undated) RX ORDER — PROPOFOL 10 MG/ML
INJECTION, EMULSION INTRAVENOUS
Status: DISPENSED
Start: 2022-02-11

## (undated) RX ORDER — LIDOCAINE HYDROCHLORIDE 10 MG/ML
INJECTION, SOLUTION EPIDURAL; INFILTRATION; INTRACAUDAL; PERINEURAL
Status: DISPENSED
Start: 2022-02-11